# Patient Record
Sex: FEMALE | Race: WHITE | NOT HISPANIC OR LATINO | Employment: FULL TIME | URBAN - METROPOLITAN AREA
[De-identification: names, ages, dates, MRNs, and addresses within clinical notes are randomized per-mention and may not be internally consistent; named-entity substitution may affect disease eponyms.]

---

## 2017-01-11 RX ORDER — ATORVASTATIN CALCIUM 40 MG/1
TABLET, FILM COATED ORAL
Qty: 90 TABLET | Refills: 3 | Status: SHIPPED | OUTPATIENT
Start: 2017-01-11 | End: 2017-09-12 | Stop reason: SDUPTHER

## 2017-09-12 RX ORDER — CLOPIDOGREL BISULFATE 75 MG/1
75 TABLET ORAL DAILY
Qty: 90 TABLET | Refills: 3 | Status: SHIPPED | OUTPATIENT
Start: 2017-09-12 | End: 2018-09-07

## 2017-09-12 RX ORDER — VERAPAMIL HYDROCHLORIDE 240 MG/1
240 TABLET, FILM COATED, EXTENDED RELEASE ORAL NIGHTLY
Qty: 90 TABLET | Refills: 3 | Status: SHIPPED | OUTPATIENT
Start: 2017-09-12 | End: 2018-01-07 | Stop reason: SDUPTHER

## 2017-09-12 RX ORDER — ATORVASTATIN CALCIUM 40 MG/1
40 TABLET, FILM COATED ORAL NIGHTLY
Qty: 90 TABLET | Refills: 3 | Status: SHIPPED | OUTPATIENT
Start: 2017-09-12 | End: 2018-10-08 | Stop reason: SDUPTHER

## 2017-09-12 RX ORDER — LOSARTAN POTASSIUM 50 MG/1
50 TABLET ORAL DAILY
Qty: 90 TABLET | Refills: 3 | Status: SHIPPED | OUTPATIENT
Start: 2017-09-12 | End: 2018-09-07

## 2017-10-26 ENCOUNTER — OFFICE VISIT (OUTPATIENT)
Dept: CARDIOLOGY | Facility: CLINIC | Age: 62
End: 2017-10-26

## 2017-10-26 VITALS
WEIGHT: 193.54 LBS | BODY MASS INDEX: 31.1 KG/M2 | DIASTOLIC BLOOD PRESSURE: 69 MMHG | SYSTOLIC BLOOD PRESSURE: 146 MMHG | HEART RATE: 69 BPM | HEIGHT: 66 IN

## 2017-10-26 DIAGNOSIS — I10 ESSENTIAL HYPERTENSION: ICD-10-CM

## 2017-10-26 DIAGNOSIS — M54.81 OCCIPITAL NEURALGIA OF LEFT SIDE: Primary | ICD-10-CM

## 2017-10-26 PROCEDURE — 93291 INTERROG DEV EVAL SCRMS IP: CPT | Performed by: INTERNAL MEDICINE

## 2017-10-26 PROCEDURE — 99212 OFFICE O/P EST SF 10 MIN: CPT | Performed by: INTERNAL MEDICINE

## 2017-10-26 NOTE — PROGRESS NOTES
OFFICE FOLLOW UP     Date of Encounter:10/26/2017     Name: Agustina Reyes  : 1955  Address: [address] [place] [address]  Phone: [phone]    PCP: Anshul Gill MD  [address] [facility] [address] [place] [address]    Agustina Reyes is a 62 y.o. female.      Chief Complaint: Annual follow up  HTN, CVA with loop recorder interrogation    Problem List:   1. 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 MRI Brain  w/o contrast, 2014:  Showing scattered foci of acute ischemia on left occipital lobe.    b. MRA of neck with and without contrast:  Focal area of the P1 segment of left posterior cerebral artery.    c. Echocardiogram, 2014, LVEF 60-65%.  No  thrombus in left atrial appendage. No PFO. Prominent Chiari network noted in right atrium.    d. Carotid duplex, 2014:  Showing no significant carotid artery stenosis bilaterally.     e. Medtronic loop recorder implant, 2014.   f. Transcranial  Doppler, 2014:  Showing normal flow and wave form seen in MELODIE, NCA, terminal ICA, PCA.  2.  Hypertension.   3. Pre-Diabetes:  a. Hemoglobin A1c 2016:  6.2   4. Exertional Calf burning, possible claudification  5. History of migraines.   6. Hyperlipidemia.   7. Former tobacco use.      Allergies   Allergen Reactions   • Erythromycin        Current Medications:  •  aspirin 81 MG by mouth Daily.  •  atorvastatin 40 MG by mouth Every Night.  •  clopidogrel 75 MG by mouth Daily  •  fluticasone 50 MCG/ACT nasal spray, Daily  •  losartan 50 MG by mouth Daily   •  verapamil  MG CR by mouth Every Night    History of Present Illness:  The patient returns for follow-up today.  She is still working in [employer].  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   She's been unable to lose weight.  Blood pressure at [employer] is reviewed  and is at the lower limits of normal.  Her hemoglobin A1c and LDL are at target.         The following portions of the  "patient's history were reviewed and updated as appropriate: allergies, current medications and problem list.    HPI: Pertinent positives as listed in the HPI.  All other systems reviewed and negative.    Objective:    Vitals:    10/26/17 1147 10/26/17 1149   BP: 157/61 146/69   BP Location: Left arm Left arm   Patient Position: Sitting Standing   Pulse: 76 69   Weight: 193 lb 8.6 oz (87.8 kg)    Height: 66\" (167.6 cm)        Physical Exam:  GENERAL: Alert, cooperative, in no acute distress; overweight.  HEENT: Fundoscopic deferred, otherwise unremarkable.  NECK: No Jugular venous distention, adenopathy, or thyromegaly noted.   HEART: Regular rhythm, normal rate, and no murmurs, gallops, or rubs.   LUNGS: Clear to auscultation bilaterally. No wheezing, rales or ronchi.  ABDOMEN: Flat without evidence of organomegaly, masses, or tenderness.  NEUROLOGIC: No focal abnormalities involving strength or sensation are noted.   EXTREMITIES: No clubbing, cyanosis, or edema noted.     Diagnostic Data:  NOne available    Procedures   Loop recorder interrogated today by [employer] rep reveals NO episodes and \"good\" battery life.      Assessment and Plan: Had a long talk with the patient.  She needs to lose weight and understands this.  We talked about diet strategies that involve low carbohydrate diets and I recommended the [place] diet book to her.  Her blood pressures, as best I can tell, outside of this office are at target.  We will continue to follow this.  Her loop is negative for the third year.  I think that it is important that we continue current medications except for Plavix and I think it would be reasonable to discontinue this drug while continuing aspirin.  I do not think she has claudication and she reports no cardiac symptoms.    Other than continuing current medications and recommendations as noted above, and continuing general recommendations of the AHA/ACC per diet, exercise, etc. I don't have anything more " to offer.  I will see her back in one year, sooner when necessary basis.    I, Paco Bernal MD, [facility], [facility], personally performed the services described in this documentation as scribed by the above named individual in my presence, and it is both accurate and complete. At 2:09 PM on 10/26/2017    Scribed for Paco Bernal MD by Crystal Louis RN. 10/26/2017 12:24 PM.    EMR Dragon/Transcription Disclaimer:  Much of this encounter note is an electronic transcription/translation of spoken language to printed text.  The electronic translation of spoken language may permit erroneous, or at times, nonsensical words or phrases to be inadvertently transcribed.  Although I have reviewed the note for such errors, some may still exist.

## 2017-10-30 ENCOUNTER — TELEPHONE (OUTPATIENT)
Dept: CARDIOLOGY | Facility: CLINIC | Age: 62
End: 2017-10-30

## 2017-10-30 NOTE — TELEPHONE ENCOUNTER
Pt was checked in office last week by Medtronic rep and actively transmitting was marked on interrogation.  Called and left message for pt to call us and verify if Carelink box is plugged up because we are not receiving readings.      She is aware her Carelink box is not plugged up.

## 2018-01-08 RX ORDER — VERAPAMIL HYDROCHLORIDE 240 MG/1
TABLET, FILM COATED, EXTENDED RELEASE ORAL
Qty: 90 TABLET | Refills: 2 | Status: SHIPPED | OUTPATIENT
Start: 2018-01-08 | End: 2018-10-08 | Stop reason: SDUPTHER

## 2018-10-08 RX ORDER — LOSARTAN POTASSIUM 50 MG/1
TABLET ORAL
Qty: 90 TABLET | Refills: 0 | OUTPATIENT
Start: 2018-10-08

## 2018-10-08 RX ORDER — ATORVASTATIN CALCIUM 40 MG/1
TABLET, FILM COATED ORAL
Qty: 90 TABLET | Refills: 0 | OUTPATIENT
Start: 2018-10-08

## 2018-10-08 RX ORDER — ATORVASTATIN CALCIUM 40 MG/1
TABLET, FILM COATED ORAL
Qty: 90 TABLET | Refills: 0 | Status: SHIPPED | OUTPATIENT
Start: 2018-10-08 | End: 2018-10-09 | Stop reason: SDUPTHER

## 2018-10-08 RX ORDER — LOSARTAN POTASSIUM 50 MG/1
50 TABLET ORAL DAILY
Qty: 90 TABLET | Refills: 0 | Status: SHIPPED | OUTPATIENT
Start: 2018-10-08 | End: 2018-10-30 | Stop reason: SDUPTHER

## 2018-10-08 RX ORDER — VERAPAMIL HYDROCHLORIDE 240 MG/1
TABLET, FILM COATED, EXTENDED RELEASE ORAL
Qty: 90 TABLET | Refills: 0 | Status: SHIPPED | OUTPATIENT
Start: 2018-10-08 | End: 2018-10-09 | Stop reason: SDUPTHER

## 2018-10-08 RX ORDER — VERAPAMIL HYDROCHLORIDE 240 MG/1
TABLET, FILM COATED, EXTENDED RELEASE ORAL
Qty: 90 TABLET | Refills: 0 | OUTPATIENT
Start: 2018-10-08

## 2018-10-09 RX ORDER — VERAPAMIL HYDROCHLORIDE 240 MG/1
TABLET, FILM COATED, EXTENDED RELEASE ORAL
Qty: 90 TABLET | Refills: 0 | Status: SHIPPED | OUTPATIENT
Start: 2018-10-09 | End: 2019-01-07 | Stop reason: SDUPTHER

## 2018-10-09 RX ORDER — ATORVASTATIN CALCIUM 40 MG/1
TABLET, FILM COATED ORAL
Qty: 90 TABLET | Refills: 0 | Status: SHIPPED | OUTPATIENT
Start: 2018-10-09 | End: 2019-01-07 | Stop reason: SDUPTHER

## 2018-10-30 ENCOUNTER — OFFICE VISIT (OUTPATIENT)
Dept: CARDIOLOGY | Facility: CLINIC | Age: 63
End: 2018-10-30

## 2018-10-30 VITALS
HEART RATE: 70 BPM | BODY MASS INDEX: 32.14 KG/M2 | WEIGHT: 200 LBS | DIASTOLIC BLOOD PRESSURE: 70 MMHG | SYSTOLIC BLOOD PRESSURE: 154 MMHG | HEIGHT: 66 IN

## 2018-10-30 DIAGNOSIS — Z86.73 HISTORY OF CVA (CEREBROVASCULAR ACCIDENT): ICD-10-CM

## 2018-10-30 DIAGNOSIS — I10 ESSENTIAL HYPERTENSION: Primary | ICD-10-CM

## 2018-10-30 DIAGNOSIS — G43.909 MIGRAINE WITHOUT STATUS MIGRAINOSUS, NOT INTRACTABLE, UNSPECIFIED MIGRAINE TYPE: ICD-10-CM

## 2018-10-30 DIAGNOSIS — E78.49 OTHER HYPERLIPIDEMIA: ICD-10-CM

## 2018-10-30 PROCEDURE — 99214 OFFICE O/P EST MOD 30 MIN: CPT | Performed by: INTERNAL MEDICINE

## 2018-10-30 RX ORDER — LOSARTAN POTASSIUM 100 MG/1
100 TABLET ORAL DAILY
Qty: 90 TABLET | Refills: 3 | Status: SHIPPED | OUTPATIENT
Start: 2018-10-30 | End: 2019-01-04 | Stop reason: SDUPTHER

## 2018-10-30 RX ORDER — CHLORTHALIDONE 25 MG/1
25 TABLET ORAL DAILY
Qty: 90 TABLET | Refills: 3 | Status: SHIPPED | OUTPATIENT
Start: 2018-10-30 | End: 2019-09-28 | Stop reason: SDUPTHER

## 2018-10-30 NOTE — PROGRESS NOTES
"  OFFICE FOLLOW UP     Date of Encounter:10/30/2018     Name: Agustina Reyes  : 1955  Address: [address] [place] [address]  Home Phone:[phone]    PCP:         Agustina Reyes is a 63 y.o. female.      Chief Complaint: Follow up of CVA, HTN, HLD    Problem List:   1. Left occipital  CVA with a possible LPCA stenosis by MRA, 2014  a. MRI Brain  w/o contrast, 2014:  Showing scattered foci of acute ischemia on left occipital lobe.    b. MRA of neck with and without contrast:  Focal area of the P1 segment of left posterior cerebral artery.    c. Echocardiogram, 2014, LVEF 60-65%.  No  thrombus in left atrial appendage. No PFO. Prominent Chiari network noted in right atrium.    d. Carotid duplex, 2014:  Showing no significant carotid artery stenosis bilaterally.     e. Medtronic loop recorder implant, 2014 (battery life , 2018)  f. Transcranial  Doppler, 2014:  Showing normal flow and wave form seen in MELODIE, NCA, terminal ICA, PCA.  2. Hypertension.   3. Pre-Diabetes:  a. Hemoglobin A1c 2016:  6.2   4. Exertional Calf burning, possible claudification  5. History of migraines.   6. Hyperlipidemia.   7. Former tobacco use.      Allergies   Allergen Reactions   • Erythromycin        Current Medications:  •  aspirin 81 MG by mouth Daily  •  atorvastatin 40 MG BY MOUTH DAILY  •  fluticasone 50 MCG/ACT nasal spray, Daily.  •  losartan 50 MG by mouth Daily.  •  verapamil  MG CR BY MOUTH NIGHTLY    History of Present Illness:   Agustina Reyes returns for scheduled follow-up this afternoon.  Since her last visit 1 year ago, she states that \"all of my doctors are retiring\".  She has not had further neurologic symptoms.  She has not had palpitations.  Her loop recorder battery stopped functioning after 3 years and no atrial arrhythmias were noted during that time.  The patient has a long-standing history of migraines and says that they have \"started " "back\"  and that she would  like referral to a neurologist.  She is well states that home blood pressure readings (systolic) in the range of 150-170 mmHg.  She has not had recent labs drawn.           The following portions of the patient's history were reviewed and updated as appropriate: allergies, current medications and problem list.    ROS: Pertinent positives as listed in the HPI.  All other systems reviewed and negative.    Objective:    Vitals:    10/30/18 1518 10/30/18 1519   BP: 156/70 154/70   BP Location: Right arm Right arm   Patient Position: Sitting Standing   Pulse: 66 70   Weight: 90.7 kg (200 lb)    Height: 167.6 cm (66\")        Physical Exam:  GENERAL: Alert, cooperative, in no acute distress.   HEENT: Normocephalic, no adenopathy, no jugular venous distention  HEART: No discrete PMI is noted. Regular rhythm, normal rate, and no murmurs, gallops, or rubs.   LUNGS: Clear to auscultation bilaterally. No wheezing, rales or ronchi.  ABDOMEN: Soft, bowel sounds present, non-tender   NEUROLOGIC: No focal abnormalities involving strength or sensation are noted.   EXTREMITIES: No clubbing, cyanosis, or edema noted.     Diagnostic Data:  No new labs available to review.     Procedures  Loop recorder has reached \"end of battery life\"    Assessment and Plan:   1.  CVA: Secondary prevention with ASA, statin, BP control is underway.  2.  HTN: Control is suboptimal.  We will ask her to continue home blood pressure readings have instructed her on the method to use in this regard.  Today we will increase her losartan from 50 to 100 mg by mouth daily and give her chlorthalidone 25 mg by mouth daily.  She will call us with blood pressures in a few weeks and return in 6 months to see us.  3.  HLD:  We'll obtain a lipid panel and hemoglobin A1c today.    I, Paco Bernal MD, personally performed the services described as documented by the above named individual. I have made any necessary edits and it is both " accurate and complete 10/30/2018  6:03 PM    I will see Agustina Reyes back in one year or sooner on an as needed basis.        Scribed for Paco Bernal MD by Crystal Louis RN. 10/30/2018 3:24 PM.        EMR Dragon/Transcription Disclaimer:  Much of this encounter note is an electronic transcription/translation of spoken language to printed text.  The electronic translation of spoken language may permit erroneous, or at times, nonsensical words or phrases to be inadvertently transcribed.  Although I have reviewed the note for such errors, some may still exist.

## 2018-11-15 ENCOUNTER — OFFICE VISIT (OUTPATIENT)
Dept: NEUROLOGY | Facility: CLINIC | Age: 63
End: 2018-11-15

## 2018-11-15 VITALS
HEART RATE: 67 BPM | WEIGHT: 196 LBS | DIASTOLIC BLOOD PRESSURE: 68 MMHG | BODY MASS INDEX: 31.64 KG/M2 | SYSTOLIC BLOOD PRESSURE: 132 MMHG | OXYGEN SATURATION: 97 %

## 2018-11-15 DIAGNOSIS — R51.0 POSITIONAL HEADACHE: ICD-10-CM

## 2018-11-15 DIAGNOSIS — Z86.73 HISTORY OF CVA (CEREBROVASCULAR ACCIDENT): ICD-10-CM

## 2018-11-15 DIAGNOSIS — G43.109 MIGRAINE WITH AURA AND WITHOUT STATUS MIGRAINOSUS, NOT INTRACTABLE: Primary | ICD-10-CM

## 2018-11-15 PROCEDURE — 99204 OFFICE O/P NEW MOD 45 MIN: CPT | Performed by: NURSE PRACTITIONER

## 2018-11-15 RX ORDER — DIAZEPAM 5 MG/1
TABLET ORAL
Qty: 1 TABLET | Refills: 0 | Status: SHIPPED | OUTPATIENT
Start: 2018-11-15 | End: 2019-04-17

## 2018-11-15 RX ORDER — PROCHLORPERAZINE MALEATE 5 MG/1
5-10 TABLET ORAL EVERY 6 HOURS PRN
Qty: 20 TABLET | Refills: 5 | Status: SHIPPED | OUTPATIENT
Start: 2018-11-15

## 2018-11-15 RX ORDER — BUTALBITAL, ASPIRIN, AND CAFFEINE 50; 325; 40 MG/1; MG/1; MG/1
1 CAPSULE ORAL EVERY 6 HOURS PRN
Qty: 20 CAPSULE | Refills: 0 | Status: SHIPPED | OUTPATIENT
Start: 2018-11-15 | End: 2019-05-23 | Stop reason: ALTCHOICE

## 2018-11-15 NOTE — PROGRESS NOTES
"Subjective:     Patient ID: Agustina Reyes is a 63 y.o. female.    CC:   Chief Complaint   Patient presents with   • Migraine       HPI:   History of Present Illness     This is a very pleasant 63-year-old female who presents to establish care with neurology for history of migraines with worsening symptoms over the past several months.  She was referred by her cardiologist for further evaluation and treatment (PCP recently retired).  She tells me she was diagnosed with migraines around the age of 24.  She tells me that she always gets a visual aura with spots and flashing lights in her left side of her vision which will last 20 or 30 minutes and then she has a throbbing headache with nausea and photophobia. She reports she was diagnosed with \"cluster migraines\". She tells me that her migraines are always on the left side.  She was previously treated by Dr. Milind Wright neurology.  She has previously tried Imitrex, Relpax, Topamax, Depakote and Cafergot which caused severe nausea and vomiting.  Unfortunately in August 2014 she had a severe migraine for 2 days and then on the third day she developed right-sided weakness and paresthesias.  She was admitted to [facility] and was found to have a few left occipital strokes.  She also had an MRA of the head which did show some focal stenosis in the left PCA distribution. MRA neck unremarkable. She was treated with aspirin, Plavix and Lipitor.  She was finally weaned off of the Plavix with negative loop recorder.  She tells me she underwent a loop recorder, echo and carotid duplex.  She's been followed by cardiology Dr. Paco Bernal long-term.  She does take verapamil which she has been on for 30+ years at the current dose.  She does continue to take Lipitor and aspirin.  She tells me in July of this year she started to have migraines more frequently 2/month.  She tells me in August she had 4 episodes, in September she had 2 episodes, in October she had 6 " episodes with her most recent episode on 10/24/18.  She has had no episodes so far this month.  She has been concerned because previously they were once every several months.  She has never noted any trigger for her migraines.  She has had her eyes checked recently and has previously had cataract surgery. Headaches are worsened with bending over, coughing and sneezing which are different than prior migraines.    The following portions of the patient's history were reviewed and updated as appropriate: allergies, current medications, past family history, past medical history, past social history, past surgical history and problem list.    Past Medical History:   Diagnosis Date   • Hyperlipidemia    • Hypertension    • Migraines    • Occipital neuralgia of left side    • Stroke ([facility])        Past Surgical History:   Procedure Laterality Date   • CATARACT EXTRACTION     • HYSTERECTOMY         Social History     Socioeconomic History   • Marital status: Single     Spouse name: Not on file   • Number of children: Not on file   • Years of education: Not on file   • Highest education level: Not on file   Social Needs   • Financial resource strain: Not on file   • Food insecurity - worry: Not on file   • Food insecurity - inability: Not on file   • Transportation needs - medical: Not on file   • Transportation needs - non-medical: Not on file   Occupational History   • Not on file   Tobacco Use   • Smoking status: Former Smoker     Last attempt to quit: 10/26/2014     Years since quittin.0   • Smokeless tobacco: Never Used   Substance and Sexual Activity   • Alcohol use: Yes     Comment: occasional   • Drug use: No   • Sexual activity: Defer   Other Topics Concern   • Not on file   Social History Narrative   • Not on file       Family History   Problem Relation Age of Onset   • Heart disease Mother    • Stroke Mother    • Hypertension Mother    • Heart disease Father    • Hypertension Father    • Stroke Maternal  Grandfather         Review of Systems   Constitutional: Negative for chills, fatigue, fever and unexpected weight change.   HENT: Negative for ear pain, hearing loss, nosebleeds, rhinorrhea and sore throat.    Eyes: Positive for photophobia. Negative for pain, discharge, itching and visual disturbance.   Respiratory: Negative for cough, chest tightness, shortness of breath and wheezing.    Cardiovascular: Negative for chest pain, palpitations and leg swelling.   Gastrointestinal: Negative for abdominal pain, blood in stool, constipation, diarrhea, nausea and vomiting.   Genitourinary: Negative for dysuria, frequency, hematuria and urgency.   Musculoskeletal: Negative for arthralgias, back pain, gait problem, joint swelling, myalgias, neck pain and neck stiffness.   Skin: Negative for rash and wound.   Allergic/Immunologic: Negative for environmental allergies and food allergies.   Neurological: Positive for headaches. Negative for dizziness, tremors, syncope, speech difficulty, weakness and light-headedness.   Hematological: Negative for adenopathy. Does not bruise/bleed easily.   Psychiatric/Behavioral: Negative for agitation, confusion, decreased concentration, hallucinations, sleep disturbance and suicidal ideas. The patient is not nervous/anxious.    All other systems reviewed and are negative.       Objective:    Neurologic Exam     Mental Status   Oriented to person, place, and time.   Registration: recalls 3 of 3 objects. Recall at 5 minutes: recalls 3 of 3 objects. Follows 3 step commands.   Attention: normal. Concentration: normal.   Speech: speech is normal   Level of consciousness: alert  Knowledge: good and consistent with education. Able to perform simple calculations.   Able to name object. Able to read. Able to repeat. Able to write. Normal comprehension.     Cranial Nerves   Cranial nerves II through XII intact.     Motor Exam   Muscle bulk: normal  Overall muscle tone: normal    Strength   Strength  5/5 except as noted.   Right strength: Slightly weaker RUE chronic 2nd to CVA    Sensory Exam   Light touch normal.   Vibration normal.   Proprioception normal.   Pinprick normal.     Gait, Coordination, and Reflexes     Gait  Gait: normal    Coordination   Romberg: negative  Finger to nose coordination: normal  Heel to shin coordination: normal    Tremor   Resting tremor: absent  Intention tremor: absent  Action tremor: absent    Reflexes   Right brachioradialis: 2+  Left brachioradialis: 2+  Right biceps: 2+  Left biceps: 2+  Right triceps: 2+  Left triceps: 2+  Right patellar: 2+  Left patellar: 2+  Right achilles: 2+  Left achilles: 2+  Right : 2+  Left : 2+  Right plantar: normal  Left plantar: normal  Right Santizo: absent  Left Santizo: absent  Right ankle clonus: absent  Left ankle clonus: absent      Physical Exam   Constitutional: She is oriented to person, place, and time. She appears well-developed and well-nourished.   Eyes:   Fundoscopic exam:       The right eye shows no papilledema. The right eye shows red reflex.        The left eye shows no papilledema. The left eye shows red reflex.   Neck: Carotid bruit is not present.   Cardiovascular: Normal rate, regular rhythm, S1 normal, S2 normal and normal heart sounds.   Pulmonary/Chest: Effort normal and breath sounds normal.   Neurological: She is oriented to person, place, and time. She has a normal Finger-Nose-Finger Test, a normal Heel to Villarreal Test and a normal Romberg Test. Gait normal.   Reflex Scores:       Tricep reflexes are 2+ on the right side and 2+ on the left side.       Bicep reflexes are 2+ on the right side and 2+ on the left side.       Brachioradialis reflexes are 2+ on the right side and 2+ on the left side.       Patellar reflexes are 2+ on the right side and 2+ on the left side.       Achilles reflexes are 2+ on the right side and 2+ on the left side.  Psychiatric: She has a normal mood and affect. Her speech is normal and  behavior is normal. Judgment and thought content normal. Cognition and memory are normal.       Assessment/Plan:       Agustina was seen today for migraine.    Diagnoses and all orders for this visit:    Migraine with aura and without status migrainosus, not intractable  -     MRI Brain With & Without Contrast  -     MRI Angiogram Head Without Contrast  -     diazePAM (VALIUM) 5 MG tablet; Take 30-45 minutes prior to MRI  -     CBC & Differential  -     Comprehensive Metabolic Panel  -     C-reactive Protein  -     Sedimentation Rate  -     Antinuclear Antibody With Reflex [place]  -     Thyroid Panel With TSH  -     butalbital-aspirin-caffeine (FIORINAL) -40 MG per capsule; Take 1 capsule by mouth Every 6 (Six) Hours As Needed for Headache or Migraine.  -     prochlorperazine (COMPAZINE) 5 MG tablet; Take 1-2 tablets by mouth Every 6 (Six) Hours As Needed for Nausea or Vomiting (migraine).    Positional headache  -     MRI Brain With & Without Contrast  -     MRI Angiogram Head Without Contrast  -     CBC & Differential  -     Comprehensive Metabolic Panel  -     C-reactive Protein  -     Sedimentation Rate  -     Antinuclear Antibody With Reflex [place]  -     Thyroid Panel With TSH    History of CVA (cerebrovascular accident)  -     MRI Brain With & Without Contrast  -     MRI Angiogram Head Without Contrast         MRI Brain with and without contrast to r/o acute abnormalities with prior stroke history and change in headaches. MRA head with previously known cerebral artery stenosis with recurrent migraines with aura to re-eval this region. Continue Aspirin and Lipitor. Labs today since she has not had labs in some time. Needs to establish with new PCP. Fioricet PRN migraines-to be used sparingly. Compazine PRN migraines. No preventives at this time, but if consistently more frequent would consider amitritpyline. Triptans are contraindicated. F/U in 4 weeks or sooner if needed. We reviewed s&s of stroke and  when to present to ER for further eval. Reviewed medications, potential side effects and signs and symptoms to report. Discussed risk versus benefits of treatment plan with patient and/or family-including medications, labs and radiology that may be ordered. Addressed questions and concerns during visit. Patient and/or family verbalized understanding and agree with plan.    During this visit the following were done:  Labs Reviewed []    Labs Ordered [x]    Radiology Reports Reviewed [x]    Radiology Ordered [x]    PCP Records Reviewed []    Referring Provider Records Reviewed [x]    ER Records Reviewed [x]    Hospital Records Reviewed [x]    History Obtained From Family []    Radiology Images Reviewed [x]    Other Reviewed [x]    Records Requested []      EMR Dragon/Transcription Disclaimer:  Much of this encounter note is an electronic transcription of spoken language to printed text. Electronic transcription of spoken language may permit erroneous words or phrases to be inadvertently transcribed. Although I have reviewed the note for such errors, some may still exist in this documentation.      [place] Ivye, APRN  11/15/2018

## 2018-11-27 ENCOUNTER — TELEPHONE (OUTPATIENT)
Dept: NEUROLOGY | Facility: CLINIC | Age: 63
End: 2018-11-27

## 2018-11-28 NOTE — TELEPHONE ENCOUNTER
We will complete MRI Brain first and if this is abnormal or shows anything concerning then we can get the MRA approved.

## 2018-12-03 ENCOUNTER — TELEPHONE (OUTPATIENT)
Dept: NEUROLOGY | Facility: CLINIC | Age: 63
End: 2018-12-03

## 2018-12-03 NOTE — TELEPHONE ENCOUNTER
Please notify patient mri brain shows 2 old strokes and otherwise very minimal aging changes associated with her history of migraines. No new findings. I am going by the radiology report only-if she can bring the mri brain disc when she returns for follow up I can review the images as well. Thanks.

## 2018-12-05 NOTE — TELEPHONE ENCOUNTER
Informed pt of the mri results and recommend she bring the actual dis for [place] to view on her next f/u>

## 2018-12-17 ENCOUNTER — APPOINTMENT (OUTPATIENT)
Dept: LAB | Facility: HOSPITAL | Age: 63
End: 2018-12-17

## 2018-12-17 ENCOUNTER — OFFICE VISIT (OUTPATIENT)
Dept: NEUROLOGY | Facility: CLINIC | Age: 63
End: 2018-12-17

## 2018-12-17 VITALS
OXYGEN SATURATION: 96 % | DIASTOLIC BLOOD PRESSURE: 62 MMHG | SYSTOLIC BLOOD PRESSURE: 136 MMHG | HEART RATE: 78 BPM | WEIGHT: 198 LBS | BODY MASS INDEX: 31.96 KG/M2

## 2018-12-17 DIAGNOSIS — G43.109 MIGRAINE WITH AURA AND WITHOUT STATUS MIGRAINOSUS, NOT INTRACTABLE: ICD-10-CM

## 2018-12-17 DIAGNOSIS — Z86.73 HISTORY OF CVA (CEREBROVASCULAR ACCIDENT): ICD-10-CM

## 2018-12-17 DIAGNOSIS — I66.22 OCCLUSION AND STENOSIS OF LEFT POSTERIOR CEREBRAL ARTERY: Primary | ICD-10-CM

## 2018-12-17 LAB
ALBUMIN SERPL-MCNC: 4.67 G/DL (ref 3.2–4.8)
ALBUMIN/GLOB SERPL: 2.3 G/DL (ref 1.5–2.5)
ALP SERPL-CCNC: 81 U/L (ref 25–100)
ALT SERPL W P-5'-P-CCNC: 17 U/L (ref 7–40)
ANION GAP SERPL CALCULATED.3IONS-SCNC: 10 MMOL/L (ref 3–11)
AST SERPL-CCNC: 16 U/L (ref 0–33)
BASOPHILS # BLD AUTO: 0.05 10*3/MM3 (ref 0–0.2)
BASOPHILS NFR BLD AUTO: 0.6 % (ref 0–1)
BILIRUB SERPL-MCNC: 0.5 MG/DL (ref 0.3–1.2)
BUN BLD-MCNC: 12 MG/DL (ref 9–23)
BUN/CREAT SERPL: 15.4 (ref 7–25)
CALCIUM SPEC-SCNC: 9.5 MG/DL (ref 8.7–10.4)
CHLORIDE SERPL-SCNC: 103 MMOL/L (ref 99–109)
CO2 SERPL-SCNC: 28 MMOL/L (ref 20–31)
CREAT BLD-MCNC: 0.78 MG/DL (ref 0.6–1.3)
CRP SERPL-MCNC: 1.41 MG/DL (ref 0–1)
DEPRECATED FTI SERPL-MCNC: 2 TBI
DEPRECATED RDW RBC AUTO: 45.5 FL (ref 37–54)
EOSINOPHIL # BLD AUTO: 0.19 10*3/MM3 (ref 0–0.3)
EOSINOPHIL NFR BLD AUTO: 2.2 % (ref 0–3)
ERYTHROCYTE [DISTWIDTH] IN BLOOD BY AUTOMATED COUNT: 14.2 % (ref 11.3–14.5)
ERYTHROCYTE [SEDIMENTATION RATE] IN BLOOD: 30 MM/HR (ref 0–30)
GFR SERPL CREATININE-BSD FRML MDRD: 75 ML/MIN/1.73
GLOBULIN UR ELPH-MCNC: 2 GM/DL
GLUCOSE BLD-MCNC: 86 MG/DL (ref 70–100)
HCT VFR BLD AUTO: 38.2 % (ref 34.5–44)
HGB BLD-MCNC: 12.3 G/DL (ref 11.5–15.5)
IMM GRANULOCYTES # BLD: 0.02 10*3/MM3 (ref 0–0.03)
IMM GRANULOCYTES NFR BLD: 0.2 % (ref 0–0.6)
LYMPHOCYTES # BLD AUTO: 2.85 10*3/MM3 (ref 0.6–4.8)
LYMPHOCYTES NFR BLD AUTO: 33.6 % (ref 24–44)
MCH RBC QN AUTO: 28 PG (ref 27–31)
MCHC RBC AUTO-ENTMCNC: 32.2 G/DL (ref 32–36)
MCV RBC AUTO: 86.8 FL (ref 80–99)
MONOCYTES # BLD AUTO: 0.46 10*3/MM3 (ref 0–1)
MONOCYTES NFR BLD AUTO: 5.4 % (ref 0–12)
NEUTROPHILS # BLD AUTO: 4.91 10*3/MM3 (ref 1.5–8.3)
NEUTROPHILS NFR BLD AUTO: 58 % (ref 41–71)
PLATELET # BLD AUTO: 279 10*3/MM3 (ref 150–450)
PMV BLD AUTO: 10.2 FL (ref 6–12)
POTASSIUM BLD-SCNC: 4 MMOL/L (ref 3.5–5.5)
PROT SERPL-MCNC: 6.7 G/DL (ref 5.7–8.2)
RBC # BLD AUTO: 4.4 10*6/MM3 (ref 3.89–5.14)
SODIUM BLD-SCNC: 141 MMOL/L (ref 132–146)
T3RU NFR SERPL: 27.6 % (ref 23–37)
T4 SERPL-MCNC: 7.4 MCG/DL (ref 4.7–11.4)
TSH SERPL DL<=0.05 MIU/L-ACNC: 2.04 MIU/ML (ref 0.35–5.35)
WBC NRBC COR # BLD: 8.48 10*3/MM3 (ref 3.5–10.8)

## 2018-12-17 PROCEDURE — 84443 ASSAY THYROID STIM HORMONE: CPT | Performed by: NURSE PRACTITIONER

## 2018-12-17 PROCEDURE — 84479 ASSAY OF THYROID (T3 OR T4): CPT | Performed by: NURSE PRACTITIONER

## 2018-12-17 PROCEDURE — 86140 C-REACTIVE PROTEIN: CPT | Performed by: NURSE PRACTITIONER

## 2018-12-17 PROCEDURE — 36415 COLL VENOUS BLD VENIPUNCTURE: CPT | Performed by: NURSE PRACTITIONER

## 2018-12-17 PROCEDURE — 80053 COMPREHEN METABOLIC PANEL: CPT | Performed by: NURSE PRACTITIONER

## 2018-12-17 PROCEDURE — 86038 ANTINUCLEAR ANTIBODIES: CPT | Performed by: NURSE PRACTITIONER

## 2018-12-17 PROCEDURE — 99214 OFFICE O/P EST MOD 30 MIN: CPT | Performed by: NURSE PRACTITIONER

## 2018-12-17 PROCEDURE — 85025 COMPLETE CBC W/AUTO DIFF WBC: CPT | Performed by: NURSE PRACTITIONER

## 2018-12-17 PROCEDURE — 84436 ASSAY OF TOTAL THYROXINE: CPT | Performed by: NURSE PRACTITIONER

## 2018-12-17 NOTE — PROGRESS NOTES
"Subjective:     Patient ID: Agustina Reyes is a 63 y.o. female.    CC:   Chief Complaint   Patient presents with   • Migraine       HPI:   History of Present Illness   This is a very pleasant 63-year-old female who presents for 1 month follow up on migraine with aura with a history of migraines for several years. Diagnosed with migraines at age 24 with aura. She tells me that she always gets a visual aura with spots and flashing lights in her left side of her vision which will last 20 or 30 minutes and then she has a throbbing headache with nausea and photophobia. Since last visit she had 6 migraines in October, 1 in November and 2 in December so far. She has not tried the Fioricet or Compazine prescribed last visit. She tells me the past 3 have not been a \"full blown migraine\", just visual symptoms and then headache and she has gone to sleep and not taken medication. She has previously tried Imitrex, Relpax, Topamax, Depakote and Cafergot which caused severe nausea and vomiting. She did have a repeat MRI of the brain with and without contrast 11/30/2018 and the imaging and radiology report reviewed today in office.  This does show to old strokes in the left occipital region which appear to be the same as her 2014 imaging, a few punctate chronic white matter changes and no significant atrophy noted.  MRI of the brain appears to remain stable from 2014 imaging.  Her MRA of the brain was denied, and I will request this again since she did have an abnormal MRA of the brain in 2014 showing left posterior cerebral artery stenosis and I do want to reevaluate this with her positional and worsening headaches.  She is on aspirin and the Lipitor still.  She was unable to tolerate the increase in losartan by her cardiologist due to severe leg cramps and has gone back to the 50 mg and blood pressure today is stable at 136/62 and I recommended she follow-up with her cardiologist to discuss this medication.    Prior significant " history: Unfortunately in 2014 she had a severe migraine for 2 days and then on the third day she developed right-sided weakness and paresthesias.  She was admitted to [facility] and was found to have a few left occipital strokes.  She also had an MRA of the head which did show some focal stenosis in the left PCA distribution. MRA neck unremarkable. She was treated with aspirin, Plavix and Lipitor.  She was finally weaned off of the Plavix with negative loop recorder.  She tells me she underwent a loop recorder, echo and carotid duplex.  She's been followed by cardiology Dr. Paco Bernal long-term.  She does take verapamil which she has been on for 30+ years at the current dose.  She does continue to take Lipitor and aspirin.      The following portions of the patient's history were reviewed and updated as appropriate: allergies, current medications, past family history, past medical history, past social history, past surgical history and problem list.    Past Medical History:   Diagnosis Date   • Hyperlipidemia    • Hypertension    • Migraines    • Occipital neuralgia of left side    • Stroke (CMS/HCC)        Past Surgical History:   Procedure Laterality Date   • CATARACT EXTRACTION     • HYSTERECTOMY         Social History     Socioeconomic History   • Marital status: Single     Spouse name: Not on file   • Number of children: Not on file   • Years of education: Not on file   • Highest education level: Not on file   Social Needs   • Financial resource strain: Not on file   • Food insecurity - worry: Not on file   • Food insecurity - inability: Not on file   • Transportation needs - medical: Not on file   • Transportation needs - non-medical: Not on file   Occupational History   • Not on file   Tobacco Use   • Smoking status: Former Smoker     Last attempt to quit: 10/26/2014     Years since quittin.1   • Smokeless tobacco: Never Used   Substance and Sexual Activity   • Alcohol use: Yes      Comment: occasional   • Drug use: No   • Sexual activity: Defer   Other Topics Concern   • Not on file   Social History Narrative   • Not on file       Family History   Problem Relation Age of Onset   • Heart disease Mother    • Stroke Mother    • Hypertension Mother    • Heart disease Father    • Hypertension Father    • Stroke Maternal Grandfather         Review of Systems   Constitutional: Negative for chills, fatigue, fever and unexpected weight change.   HENT: Negative for ear pain, hearing loss, nosebleeds, rhinorrhea and sore throat.    Eyes: Negative for photophobia, pain, discharge, itching and visual disturbance.   Respiratory: Negative for cough, chest tightness, shortness of breath and wheezing.    Cardiovascular: Negative for chest pain, palpitations and leg swelling.   Gastrointestinal: Negative for abdominal pain, blood in stool, constipation, diarrhea, nausea and vomiting.   Genitourinary: Negative for dysuria, frequency, hematuria and urgency.   Musculoskeletal: Negative for arthralgias, back pain, gait problem, joint swelling, myalgias, neck pain and neck stiffness.   Skin: Negative for rash and wound.   Allergic/Immunologic: Negative for environmental allergies and food allergies.   Neurological: Positive for headaches. Negative for dizziness, tremors, seizures, syncope, speech difficulty, weakness, light-headedness and numbness.   Hematological: Negative for adenopathy. Does not bruise/bleed easily.   Psychiatric/Behavioral: Negative for agitation, confusion, decreased concentration, hallucinations, sleep disturbance and suicidal ideas. The patient is not nervous/anxious.    All other systems reviewed and are negative.       Objective:    Neurologic Exam     Mental Status   Oriented to person, place, and time.   Level of consciousness: alert    Cranial Nerves   Cranial nerves II through XII intact.     Motor Exam   Muscle bulk: normal  Overall muscle tone: normal    Strength   Strength 5/5  except as noted.   Right strength: Mild RUE weakness chronic since CVA    Gait, Coordination, and Reflexes     Gait  Gait: normal    Coordination   Finger to nose coordination: normal    Tremor   Resting tremor: absent  Intention tremor: absent  Action tremor: absent    Reflexes   Right brachioradialis: 2+  Left brachioradialis: 2+  Right biceps: 2+  Left biceps: 2+  Right triceps: 2+  Left triceps: 2+  Right : 2+  Left : 2+      Physical Exam   Constitutional: She is oriented to person, place, and time.   Neurological: She is oriented to person, place, and time. She has a normal Finger-Nose-Finger Test. Gait normal.   Reflex Scores:       Tricep reflexes are 2+ on the right side and 2+ on the left side.       Bicep reflexes are 2+ on the right side and 2+ on the left side.       Brachioradialis reflexes are 2+ on the right side and 2+ on the left side.      Assessment/Plan:       Agustina was seen today for migraine.    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 Improving.    History of CVA (cerebrovascular accident)  Comments:  Repeat MRI Brain w/wo contrast reviewed today 11/30/18-stable compared to 2014 imaging-old occipital infarcts on Left, minimal chronic changes.           MRA of the head without contrast with a set up on consideration of increased cerebral artery stenosis or aneurysm and I would like to rule this out.  MRI of the brain does appear to remain stable.  I recommended she continue the Fioricet and Compazine when necessary and consider using this at onset of her symptoms and not to delay this.  She will follow-up in 3-4 months for reevaluation of symptoms.  I've asked her to continue to keep a log  of her migraines and if she is having within 8 episodes per month we would consider preventive medication. Reviewed medications, potential side effects and signs and symptoms to report. Discussed risk versus benefits of treatment plan with patient and/or family-including medications, labs and radiology that may be ordered. Addressed questions and concerns during visit. Patient and/or family verbalized understanding and agree with plan.  She also did not get her blood work drawn last visit and she was asked to have these obtained today after her visit and she had these drawn out in our lab and we will notify her of those results and any further recommendations by phone.    During this visit the following were done:  Labs Reviewed []    Labs Ordered [x]    Radiology Reports Reviewed [x]    Radiology Ordered [x]    PCP Records Reviewed []    Referring Provider Records Reviewed []    ER Records Reviewed []    Hospital Records Reviewed []    History Obtained From Family []    Radiology Images Reviewed [x]    Other Reviewed [x]    Records Requested []      EMR Dragon/Transcription Disclaimer:  Much of this encounter note is an electronic transcription of spoken language to printed text. Electronic transcription of spoken language may permit erroneous words or phrases to be inadvertently transcribed. Although I have reviewed the note for such errors, some may still exist in this documentation.    China Ivey, APRN  12/17/2018

## 2018-12-19 ENCOUNTER — TELEPHONE (OUTPATIENT)
Dept: NEUROLOGY | Facility: CLINIC | Age: 63
End: 2018-12-19

## 2018-12-19 LAB
ANA SER QL: NEGATIVE
Lab: NORMAL

## 2018-12-19 NOTE — TELEPHONE ENCOUNTER
----- Message from JAYNE Ponce sent at 12/19/2018 12:59 PM EST -----  Please notify patient all of her blood work looks excellent except one mild elevation in one general inflammatory marker-she has one more test pending which will tell us if this is significant or not. We will keep her posted on those results once received. Thanks, JAYNE Perla    Also, would she like us to mail her a PCP packet so she can establish care with a new primary care provider-if so go ahead and mail that to her. Thanks!

## 2018-12-19 NOTE — TELEPHONE ENCOUNTER
----- Message from JAYNE Ponce sent at 12/19/2018  1:23 PM EST -----  Please notify patient her autoimmune panel came back NEGATIVE. We will f/u as scheduled in office. Thanks, JAYNE Perla

## 2018-12-21 ENCOUNTER — TELEPHONE (OUTPATIENT)
Dept: NEUROLOGY | Facility: CLINIC | Age: 63
End: 2018-12-21

## 2019-01-04 RX ORDER — LOSARTAN POTASSIUM 50 MG/1
50 TABLET ORAL DAILY
Qty: 90 TABLET | Refills: 0 | OUTPATIENT
Start: 2019-01-04

## 2019-01-04 RX ORDER — LOSARTAN POTASSIUM 100 MG/1
100 TABLET ORAL DAILY
Qty: 90 TABLET | Refills: 3 | Status: SHIPPED | OUTPATIENT
Start: 2019-01-04 | End: 2019-05-23 | Stop reason: SDUPTHER

## 2019-01-07 RX ORDER — ATORVASTATIN CALCIUM 40 MG/1
TABLET, FILM COATED ORAL
Qty: 90 TABLET | Refills: 0 | Status: SHIPPED | OUTPATIENT
Start: 2019-01-07 | End: 2019-04-05 | Stop reason: SDUPTHER

## 2019-01-07 RX ORDER — VERAPAMIL HYDROCHLORIDE 240 MG/1
TABLET, FILM COATED, EXTENDED RELEASE ORAL
Qty: 90 TABLET | Refills: 0 | Status: SHIPPED | OUTPATIENT
Start: 2019-01-07 | End: 2019-04-05 | Stop reason: SDUPTHER

## 2019-04-08 RX ORDER — VERAPAMIL HYDROCHLORIDE 240 MG/1
TABLET, FILM COATED, EXTENDED RELEASE ORAL
Qty: 30 TABLET | Refills: 1 | Status: SHIPPED | OUTPATIENT
Start: 2019-04-08 | End: 2019-07-05 | Stop reason: SDUPTHER

## 2019-04-08 RX ORDER — ATORVASTATIN CALCIUM 40 MG/1
TABLET, FILM COATED ORAL
Qty: 30 TABLET | Refills: 1 | Status: SHIPPED | OUTPATIENT
Start: 2019-04-08 | End: 2019-07-05 | Stop reason: SDUPTHER

## 2019-04-17 ENCOUNTER — OFFICE VISIT (OUTPATIENT)
Dept: NEUROLOGY | Facility: CLINIC | Age: 64
End: 2019-04-17

## 2019-04-17 VITALS
DIASTOLIC BLOOD PRESSURE: 78 MMHG | SYSTOLIC BLOOD PRESSURE: 128 MMHG | HEIGHT: 66 IN | OXYGEN SATURATION: 92 % | BODY MASS INDEX: 30.86 KG/M2 | WEIGHT: 192 LBS | HEART RATE: 81 BPM

## 2019-04-17 DIAGNOSIS — Z86.73 HISTORY OF CVA (CEREBROVASCULAR ACCIDENT): ICD-10-CM

## 2019-04-17 DIAGNOSIS — R90.89 ABNORMAL MRA, BRAIN: Primary | ICD-10-CM

## 2019-04-17 DIAGNOSIS — G43.109 MIGRAINE WITH AURA AND WITHOUT STATUS MIGRAINOSUS, NOT INTRACTABLE: ICD-10-CM

## 2019-04-17 DIAGNOSIS — Z76.89 ENCOUNTER TO ESTABLISH CARE WITH NEW DOCTOR: ICD-10-CM

## 2019-04-17 DIAGNOSIS — I66.22 OCCLUSION AND STENOSIS OF LEFT POSTERIOR CEREBRAL ARTERY: ICD-10-CM

## 2019-04-17 PROCEDURE — 99213 OFFICE O/P EST LOW 20 MIN: CPT | Performed by: NURSE PRACTITIONER

## 2019-04-17 NOTE — PROGRESS NOTES
Subjective:     Patient ID: Agustina Reyes is a 63 y.o. female.    CC:   Chief Complaint   Patient presents with   • Migraine       HPI:   History of Present Illness   This is a very pleasant 63-year-old female who presents for 4 month follow up on migraine with aura for several years. Diagnosed with migraines at age 24 with aura. She tells me that she always gets a visual aura with spots and flashing lights in her left side of her vision which will last 20 or 30 minutes and then she has a throbbing headache with nausea and photophobia. She is having about 1-2 migraines  She has not tried the Fioricet or Compazine prescribed last visit. Usually episodes occur at work and resolve on their own without medication. She has previously tried Imitrex, Relpax, Topamax, Depakote and Cafergot which caused severe nausea and vomiting. She did have a repeat MRI of the brain with and without contrast 11/30/2018 and the imaging and radiology report reviewed today in office.  This does show 2 old strokes in the left occipital region which appear to be the same as her 2014 imaging, a few punctate chronic white matter changes and no significant atrophy noted.  MRI of the brain appears to remain stable from 2014 imaging. MRA head denied 3 times by insurance with abnormal MRA of the brain in 2014 showing left posterior cerebral artery stenosis. She is on aspirin and the Lipitor still.  HTN managed with Cardiology. Does not have a PCP but gets fasting labs through her [employer] annually.     Prior significant history: Unfortunately in August 2014 she had a severe migraine for 2 days and then on the third day she developed right-sided weakness and paresthesias.  She was admitted to [facility] and was found to have a few left occipital strokes.  She also had an MRA of the head which did show some focal stenosis in the left PCA distribution. MRA neck unremarkable. She was treated with aspirin, Plavix and Lipitor.  She  was finally weaned off of the Plavix with negative loop recorder.  She tells me she underwent a loop recorder, echo and carotid duplex.  She's been followed by cardiology Dr. Paco Bernal long-term.  She does take verapamil which she has been on for 30+ years at the current dose.  She does continue to take Lipitor and aspirin.     The following portions of the patient's history were reviewed and updated as appropriate: allergies, current medications, past family history, past medical history, past social history, past surgical history and problem list.    Past Medical History:   Diagnosis Date   • Hyperlipidemia    • Hypertension    • Migraines    • Occipital neuralgia of left side    • Stroke (CMS/HCC)        Past Surgical History:   Procedure Laterality Date   • CATARACT EXTRACTION     • HYSTERECTOMY         Social History     Socioeconomic History   • Marital status: Single     Spouse name: Not on file   • Number of children: Not on file   • Years of education: Not on file   • Highest education level: Not on file   Tobacco Use   • Smoking status: Former Smoker     Last attempt to quit: 10/26/2014     Years since quittin.4   • Smokeless tobacco: Never Used   Substance and Sexual Activity   • Alcohol use: Yes     Comment: occasional   • Drug use: No   • Sexual activity: Defer       Family History   Problem Relation Age of Onset   • Heart disease Mother    • Stroke Mother    • Hypertension Mother    • Heart disease Father    • Hypertension Father    • Stroke Maternal Grandfather         Review of Systems   Constitutional: Negative for chills, fatigue, fever and unexpected weight change.   HENT: Negative for ear pain, hearing loss, nosebleeds, rhinorrhea and sore throat.    Eyes: Negative for photophobia, pain, discharge, itching and visual disturbance.   Respiratory: Negative for cough, chest tightness, shortness of breath and wheezing.    Cardiovascular: Negative for chest pain, palpitations and leg swelling.    Gastrointestinal: Negative for abdominal pain, blood in stool, constipation, diarrhea, nausea and vomiting.   Genitourinary: Negative for dysuria, frequency, hematuria and urgency.   Musculoskeletal: Negative for arthralgias, back pain, gait problem, joint swelling, myalgias, neck pain and neck stiffness.   Skin: Negative for rash and wound.   Allergic/Immunologic: Negative for environmental allergies and food allergies.   Neurological: Negative for dizziness, tremors, seizures, syncope, speech difficulty, weakness, light-headedness, numbness and headaches.   Hematological: Negative for adenopathy. Does not bruise/bleed easily.   Psychiatric/Behavioral: Negative for agitation, confusion, decreased concentration, hallucinations, sleep disturbance and suicidal ideas. The patient is not nervous/anxious.    All other systems reviewed and are negative.       Objective:    Neurologic Exam  Mental Status   Oriented to person, place, and time.   Level of consciousness: alert     Cranial Nerves   Cranial nerves II through XII intact.      Motor Exam   Muscle bulk: normal  Overall muscle tone: normal     Strength   Strength 5/5 except as noted.   Right strength: Mild RUE weakness chronic since CVA     Gait, Coordination, and Reflexes      Gait  Gait: normal     Coordination   Finger to nose coordination: normal     Tremor   Resting tremor: absent  Intention tremor: absent  Action tremor: absent     Reflexes   Right brachioradialis: 2+  Left brachioradialis: 2+  Right biceps: 2+  Left biceps: 2+  Right triceps: 2+  Left triceps: 2+  Right : 2+  Left : 2+     Physical Exam  Constitutional: She is oriented to person, place, and time.   Neurological: She is oriented to person, place, and time. She has a normal Finger-Nose-Finger Test. Gait normal.   Reflex Scores:       Tricep reflexes are 2+ on the right side and 2+ on the left side.       Bicep reflexes are 2+ on the right side and 2+ on the left side.        Brachioradialis reflexes are 2+ on the right side and 2+ on the left side.    Assessment/Plan:       Agustina was seen today for migraine.    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 MRA head denied by insurance.    History of CVA (cerebrovascular accident)  Comments:  Continue Lipitor and Asa    Encounter to establish care with new doctor  -     Ambulatory Referral to Family Practice           Will try to get CTA done since her history of Left PCA stenosis to ensure stability. Has had some positional headaches. Continue Aspirin and Lipitor. Continue close follow up with Cardiology. Referral to establish care with PCP for preventive wellness screening. Migraines improved-can be triggered by stress during holidays. Reviewed medications, potential side effects and signs and symptoms to report. Discussed risk versus benefits of treatment plan with patient and/or family-including medications, labs and radiology that may be ordered. Addressed questions and concerns during visit. Patient and/or family verbalized understanding and agree with plan.    During this visit the following were done:  Labs Reviewed []    Labs Ordered []    Radiology Reports Reviewed []    Radiology Ordered [x]    PCP Records Reviewed []    Referring Provider Records Reviewed []    ER Records Reviewed []    Hospital Records Reviewed []    History Obtained From Family []    Radiology Images Reviewed []    Other Reviewed []    Records Requested []      EMR Dragon/Transcription Disclaimer:  Much of this encounter note is an electronic transcription of spoken language to printed text. Electronic transcription of spoken language may permit erroneous words or phrases to be  inadvertently transcribed. Although I have reviewed the note for such errors, some may still exist in this documentation.      China Ivey, APRN  4/17/2019

## 2019-05-08 ENCOUNTER — TELEPHONE (OUTPATIENT)
Dept: NEUROLOGY | Facility: CLINIC | Age: 64
End: 2019-05-08

## 2019-05-08 DIAGNOSIS — I66.22 OCCLUSION AND STENOSIS OF LEFT POSTERIOR CEREBRAL ARTERY: Primary | ICD-10-CM

## 2019-05-08 DIAGNOSIS — Z86.73 HISTORY OF CVA (CEREBROVASCULAR ACCIDENT): ICD-10-CM

## 2019-05-08 NOTE — TELEPHONE ENCOUNTER
Please notify patient I received cta head results from [facility]-this does show a continuing moderate to severe narrowing of the left posterior cerebral artery In the brain. I do recommend referring her to neurosurgery not for surgery but just for their recommendations on if she should be back on plavix along with the aspirin and cholesterol medication. If she is willing to have this referral I will place this. It is not urgent but I would like them to see her. Also she would need to take cta disc with her to that appointment. Let me know. Thanks, JAYNE Perla

## 2019-05-23 ENCOUNTER — LAB (OUTPATIENT)
Dept: LAB | Facility: HOSPITAL | Age: 64
End: 2019-05-23

## 2019-05-23 ENCOUNTER — OFFICE VISIT (OUTPATIENT)
Dept: CARDIOLOGY | Facility: CLINIC | Age: 64
End: 2019-05-23

## 2019-05-23 ENCOUNTER — OFFICE VISIT (OUTPATIENT)
Dept: NEUROSURGERY | Facility: CLINIC | Age: 64
End: 2019-05-23

## 2019-05-23 VITALS
DIASTOLIC BLOOD PRESSURE: 66 MMHG | HEIGHT: 67 IN | HEART RATE: 78 BPM | BODY MASS INDEX: 30.51 KG/M2 | SYSTOLIC BLOOD PRESSURE: 149 MMHG | WEIGHT: 194.4 LBS

## 2019-05-23 VITALS
DIASTOLIC BLOOD PRESSURE: 58 MMHG | WEIGHT: 194 LBS | HEIGHT: 66 IN | SYSTOLIC BLOOD PRESSURE: 132 MMHG | TEMPERATURE: 97.1 F | BODY MASS INDEX: 31.18 KG/M2

## 2019-05-23 DIAGNOSIS — E78.49 OTHER HYPERLIPIDEMIA: ICD-10-CM

## 2019-05-23 DIAGNOSIS — Z86.73 HISTORY OF CVA (CEREBROVASCULAR ACCIDENT): ICD-10-CM

## 2019-05-23 DIAGNOSIS — R73.03 PREDIABETES: ICD-10-CM

## 2019-05-23 DIAGNOSIS — I63.532 CEREBRAL INFARCTION DUE TO STENOSIS OF LEFT POSTERIOR CEREBRAL ARTERY (HCC): Primary | ICD-10-CM

## 2019-05-23 DIAGNOSIS — I10 ESSENTIAL HYPERTENSION: Primary | ICD-10-CM

## 2019-05-23 LAB
CHOLEST SERPL-MCNC: 136 MG/DL (ref 0–200)
HBA1C MFR BLD: 6.1 % (ref 4.8–5.6)
HDLC SERPL-MCNC: 49 MG/DL (ref 40–60)
LDLC SERPL CALC-MCNC: 71 MG/DL (ref 0–100)
LDLC/HDLC SERPL: 1.44 {RATIO}
TRIGL SERPL-MCNC: 81 MG/DL (ref 0–150)
VLDLC SERPL-MCNC: 16.2 MG/DL (ref 5–40)

## 2019-05-23 PROCEDURE — 99214 OFFICE O/P EST MOD 30 MIN: CPT | Performed by: INTERNAL MEDICINE

## 2019-05-23 PROCEDURE — 99204 OFFICE O/P NEW MOD 45 MIN: CPT | Performed by: RADIOLOGY

## 2019-05-23 PROCEDURE — 36415 COLL VENOUS BLD VENIPUNCTURE: CPT

## 2019-05-23 PROCEDURE — 80061 LIPID PANEL: CPT

## 2019-05-23 PROCEDURE — 83036 HEMOGLOBIN GLYCOSYLATED A1C: CPT

## 2019-05-23 RX ORDER — LOSARTAN POTASSIUM 100 MG/1
100 TABLET ORAL DAILY
Qty: 90 TABLET | Refills: 3
Start: 2019-05-23 | End: 2020-02-24 | Stop reason: SDUPTHER

## 2019-05-23 RX ORDER — CLOPIDOGREL BISULFATE 75 MG/1
75 TABLET ORAL DAILY
Qty: 30 TABLET | Refills: 11 | Status: SHIPPED | OUTPATIENT
Start: 2019-05-23 | End: 2020-08-17

## 2019-05-23 NOTE — PROGRESS NOTES
"NAME: DAVID CALIX   DOS: 2019  : 1955  PCP: Provider, No Known    Chief Complaint:    Chief Complaint   Patient presents with   • PCA stenosis     prior stroke       History of Present Illness:  63 y.o. female who suffered a prior left PCA vascular territory stroke in 2014.  She was found to have a \"high-grade\" left PCA stenosis, and was managed with a dual antiplatelet regimen, statin therapy, and a smoking cessation.  She is done very well since that time, with no new stroke or TIA-like symptoms.  However, she did have a recent CT angiogram for follow-up, and presents to my office for consultation regarding any possible role for neuro intervention with angioplasty/stent placement.  She is currently only on an aspirin antiplatelet and statin regimen, having been off Plavix for at least the past 6 months or so.    Past Medical History:  Past Medical History:   Diagnosis Date   • Hyperlipidemia    • Hypertension    • Migraines    • Occipital neuralgia of left side    • Stroke (CMS/HCC)        Past Surgical History:  Past Surgical History:   Procedure Laterality Date   • CATARACT EXTRACTION     • HYSTERECTOMY         Review of Systems:        Review of Systems   Neurological: Positive for headaches.   All other systems reviewed and are negative.       Medications    Current Outpatient Medications:   •  aspirin 81 MG tablet, Take 81 mg by mouth Daily., Disp: , Rfl:   •  atorvastatin (LIPITOR) 40 MG tablet, TAKE 1 TABLET BY MOUTH DAILY, Disp: 30 tablet, Rfl: 1  •  chlorthalidone (HYGROTON) 25 MG tablet, Take 1 tablet by mouth Daily., Disp: 90 tablet, Rfl: 3  •  fluticasone (FLONASE) 50 MCG/ACT nasal spray, Daily., Disp: , Rfl:   •  prochlorperazine (COMPAZINE) 5 MG tablet, Take 1-2 tablets by mouth Every 6 (Six) Hours As Needed for Nausea or Vomiting (migraine)., Disp: 20 tablet, Rfl: 5  •  verapamil SR (CALAN-SR) 240 MG CR tablet, TAKE 1 TABLET BY MOUTH NIGHTLY, Disp: 30 tablet, Rfl: 1  •  " clopidogrel (PLAVIX) 75 MG tablet, Take 1 tablet by mouth Daily., Disp: 30 tablet, Rfl: 11  •  losartan (COZAAR) 100 MG tablet, Take 1 tablet by mouth Daily., Disp: 90 tablet, Rfl: 3    Allergies:  Allergies   Allergen Reactions   • Erythromycin        Social Hx:  Social History     Tobacco Use   • Smoking status: Former Smoker     Last attempt to quit: 10/26/2014     Years since quittin.5   • Smokeless tobacco: Never Used   Substance Use Topics   • Alcohol use: Yes     Comment: occasional   • Drug use: No       Family Hx:  Family History   Problem Relation Age of Onset   • Heart disease Mother    • Stroke Mother    • Hypertension Mother    • Heart disease Father    • Hypertension Father    • Stroke Maternal Grandfather        Review of Imaging:  CT angiogram of the head dated 2019 from [facility] was reviewed along with the corresponding radiologic report.  Comparison is made to prior MR angiogram of the head dated 2014 from [facility].  Given differences in technique, there is a stable appearance of the high-grade stenosis involving the P2/3 portions of the left posterior cerebral artery.  No additional flow-limiting lesions are identified.  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    Physical Examination:  Vitals:    19 1343   BP: 132/58   Temp: 97.1 °F (36.2 °C)        General Appearance:   Well developed, well nourished, well groomed, alert, and cooperative.  Cardiovascular: Regular rate and rhythm. No carotid bruits      Neurological examination:  Neurologic Exam     Mental Status   Oriented to person, place, and time.   Speech: speech is normal   Level of consciousness: alert    Cranial Nerves   Cranial nerves II through XII intact.     Motor Exam     Strength   Strength 5/5 throughout.     Sensory Exam   Light touch normal.     Gait, Coordination, and Reflexes     Gait  Gait: normal      Diagnoses/Plan:     Ms. Reyes is a 63 y.o. female who experienced a left PCA vascular territory infarct in 2014, attributable to a left P2/3 PCA stenosis.  She has since quit smoking, and has been doing very well with an antiplatelet/statin regimen.  CT angiogram from May 2019 demonstrates a stable appearance of the left P2/3 PCA stenosis, with no new vascular abnormalities reported.  She has been doing fine for the past 6 months or so on only an aspirin and statin therapy, discontinuing Plavix.  The distal location of her left PCA stenosis is suboptimal for any neuro intervention with angioplasty/stent placement, and thus I would advocate for continued aggressive medical therapy.  She did inquire about adding restarting a Plavix, and I think this would certainly be beneficial from a stroke reduction standpoint, but similarly, she has done quite well for the past 6 months or so on on the aspirin therapy.  She is going to discuss adding back Plavix further with her cardiologist.  I be happy to see her back should she develop any new vascular abnormalities, but again the distal nature of her left PCA stenosis is best managed medically with antiplatelet/statin regimen.

## 2019-05-23 NOTE — PROGRESS NOTES
OFFICE FOLLOW UP     Date of Encounter:2019     Name: Agustina Reyes  : 1955  Address: [address] [place] [address]    PCP: Provider, No Known  [place] [id number]    Agustina Reyes is a 63 y.o. female.      Chief Complaint: Follow up of CVA, HTN    Problem List:   1. Left occipital  CVA with a possible LPCA stenosis by MRA, 2014  a. MRI Brain  w/o contrast, 2014:  Showing scattered foci of acute ischemia on left occipital lobe.    b. MRA of neck with and without contrast:  Focal area of the P1 segment of left posterior cerebral artery.    c. Echocardiogram, 2014, LVEF 60-65%.  No  thrombus in left atrial appendage. No PFO. Prominent Chiari network noted in right atrium.    d. Carotid duplex, 2014:  Showing no significant carotid artery stenosis bilaterally.     e. Medtronic loop recorder implant, 2014 (battery life , 2018)  f. Transcranial  Doppler, 2014:  Showing normal flow and wave form seen in MELODIE, NCA, terminal ICA, PCA.  2. Hypertension.   3. Pre-Diabetes:  a. Hemoglobin A1c 2016:  6.2   4. Exertional calf burning, possible claudification  5. History of migraines.   6. Hyperlipidemia.   7. Former tobacco use    Allergies:  Allergies   Allergen Reactions   • Erythromycin        Current Medications:  •  aspirin 81 MG tablet, Take 81 mg by mouth Daily  •  atorvastatin (LIPITOR) 40 MG tablet, TAKE 1 TABLET BY MOUTH DAILY  •  chlorthalidone (HYGROTON) 25 MG tablet, Take 1 tablet by mouth Daily  •  fluticasone (FLONASE) 50 MCG/ACT nasal spray, Daily.  •  losartan 50 mg by mouth Daily  •  prochlorperazine (COMPAZINE) 5 MG, Take 1-2 tablets by mouth Every 6 (Six) Hours As Needed for Nausea or Vomiting (migraine)  •  verapamil SR (CALAN-SR) 240 MG CR tablet, TAKE 1 TABLET BY MOUTH NIGHTLY    History of Present Illness: Agustina returns for follow-up today.  A neurology APRN recently carried out studies on the patient  "including MRI and CT of the head.  She was referred to Guevara Hall MD whom she saw her earlier today.  Dr. Hall suggested the addition of Plavix to her regimen.  He did not think that further studies or intervention (at this time) or warranted.  She has not had any symptoms of chest pain, TIA, or CVA.  She still works full-time at [employer].  Systolic blood pressures at home have usually been noted to be above 130.            The following portions of the patient's history were reviewed and updated as appropriate: allergies, current medications and problem list.    ROS: Pertinent positives as listed in the HPI.  All other systems reviewed and negative.    Objective:    Vitals:    05/23/19 1502 05/23/19 1503   BP: 142/64 149/66   BP Location: Right arm Right arm   Patient Position: Sitting Standing   Pulse: 72 78   Weight: 88.2 kg (194 lb 6.4 oz)    Height: 168.9 cm (66.5\")        Physical Exam:  GENERAL: Alert, cooperative, in no acute distress.   HEENT: Normocephalic, no adenopathy, no jugular venous distention  HEART: No discrete PMI is noted. Regular rhythm, normal rate, and no murmurs, gallops, or rubs.   LUNGS: Clear to auscultation bilaterally. No wheezing, rales or ronchi.  ABDOMEN: Soft, bowel sounds present, non-tender   NEUROLOGIC: No focal abnormalities involving strength or sensation are noted.   EXTREMITIES: No clubbing, cyanosis, or edema noted.     Diagnostic Data:    Lab Results   Component Value Date    TSH 2.045 12/17/2018     Lab Results   Component Value Date    GLUCOSE 86 12/17/2018    BUN 12 12/17/2018    CREATININE 0.78 12/17/2018    EGFRIFNONA 75 12/17/2018    BCR 15.4 12/17/2018    K 4.0 12/17/2018    CO2 28.0 12/17/2018    CALCIUM 9.5 12/17/2018    ALBUMIN 4.67 12/17/2018    AST 16 12/17/2018    ALT 17 12/17/2018     Lab Results   Component Value Date    WBC 8.48 12/17/2018    HGB 12.3 12/17/2018    HCT 38.2 12/17/2018    MCV 86.8 12/17/2018     12/17/2018 " "      Procedures      Assessment and Plan:   1.  History of CVA: See KAYY Hall MD note of this date. I agree that she should take lifelong LD ASA and will add Plavix, 75mg. po daily (although there is less date with this latter medication).   I will as well target a systolic blood pressure less than 130 and will increase losartan from 50 to 100 mg p.o. daily to this and.  When I tried to do this before the patient developed atypical leg pain that is not a reported side effect of losartan.  I discussed this with her and asked her to increase her losartan and to call me should any undue symptoms occur.  Because of her history of remote \"prediabetes\" and dyslipidemia under treatment I will check and optimize her hemoglobin A1c and cholesterol.  Lastly, the patient needs a primary care physician and I have recommended to him at the [facility].  2.  HTN: As noted above.  The patient is not on target.  I will increase her losartan from 50 to 100 mg p.o. Daily.  3. HLP: Target LDL is less than 70.  We will check a lipid panel and adjust her moderate dose atorvastatin if we need to.    I, Paco Bernal MD, [facility], [facility], personally performed the services described in this documentation as scribed by the above named individual in my presence, and it is both accurate and complete. At 4:28 PM on 05/23/2019        I will see Agustina Reyes back in one year or sooner on an as needed basis.        Scribed for Paco Bernal MD by Crystal Louis RN. 05/23/2019 3:03 PM.        EMR Dragon/Transcription Disclaimer:  Much of this encounter note is an electronic transcription/translation of spoken language to printed text.  The electronic translation of spoken language may permit erroneous, or at times, nonsensical words or phrases to be inadvertently transcribed.  Although I have reviewed the note for such errors, some may still exist.             "

## 2019-06-10 ENCOUNTER — OFFICE VISIT (OUTPATIENT)
Dept: INTERNAL MEDICINE | Facility: CLINIC | Age: 64
End: 2019-06-10

## 2019-06-10 VITALS
SYSTOLIC BLOOD PRESSURE: 122 MMHG | BODY MASS INDEX: 31.34 KG/M2 | HEIGHT: 66 IN | DIASTOLIC BLOOD PRESSURE: 56 MMHG | WEIGHT: 195 LBS | HEART RATE: 68 BPM

## 2019-06-10 DIAGNOSIS — E66.09 CLASS 1 OBESITY DUE TO EXCESS CALORIES WITH SERIOUS COMORBIDITY AND BODY MASS INDEX (BMI) OF 31.0 TO 31.9 IN ADULT: ICD-10-CM

## 2019-06-10 DIAGNOSIS — Z12.83 SKIN CANCER SCREENING: ICD-10-CM

## 2019-06-10 DIAGNOSIS — R73.03 PREDIABETES: ICD-10-CM

## 2019-06-10 DIAGNOSIS — Z12.39 BREAST CANCER SCREENING: ICD-10-CM

## 2019-06-10 DIAGNOSIS — G43.109 MIGRAINE WITH AURA AND WITHOUT STATUS MIGRAINOSUS, NOT INTRACTABLE: ICD-10-CM

## 2019-06-10 DIAGNOSIS — K62.5 RECTAL BLEEDING: ICD-10-CM

## 2019-06-10 DIAGNOSIS — I66.22 OCCLUSION AND STENOSIS OF LEFT POSTERIOR CEREBRAL ARTERY: ICD-10-CM

## 2019-06-10 DIAGNOSIS — R20.2 PARESTHESIAS: ICD-10-CM

## 2019-06-10 DIAGNOSIS — E78.2 MIXED HYPERLIPIDEMIA: ICD-10-CM

## 2019-06-10 DIAGNOSIS — Z12.11 COLON CANCER SCREENING: ICD-10-CM

## 2019-06-10 DIAGNOSIS — I10 ESSENTIAL HYPERTENSION: Primary | ICD-10-CM

## 2019-06-10 PROCEDURE — 99204 OFFICE O/P NEW MOD 45 MIN: CPT | Performed by: INTERNAL MEDICINE

## 2019-06-10 NOTE — PROGRESS NOTES
[facility]     Agustina Reyes  1955   [id number]      Patient Care Team:  Rich Zaragoza MD as PCP - Internal Medicine (Internal Medicine)  China Ivey APRN as Referring Physician (Neurology)    Chief Complaint::   Chief Complaint   Patient presents with   • Establish Care        HPI  Ms. Reyes is a 63-year-old white female, referred by Dr. Paco Bernal, who comes in to establish care.  She is here for follow-up of her prediabetes, hypertension, hyperlipidemia, history of stroke, and migraine.  Previously she was under the care of Dr. Anshul Gill.  She admits she has not had mammography in quite some time, has never had colonoscopy.  She recently saw Dr. Tolentino to review her cerebral anatomy, who felt that she should add clopidogrel to aspirin because of a cerebral artery stenosis.  She complains of some ongoing issues with allergic rhinitis, but this is mild.  She complains of leg cramps at night.  She also has diarrhea from time to time and when that happened she has rectal bleeding.  She thinks she might have a hemorrhoid.  She would like my opinion about adding clopidogrel she also wants to know what I think about pickle juice for her cramps.  She is also curious about CBD oil for anxiety, and insomnia.  Lastly, she experiences burning in her mid calves when she walks.  She does not have calf pain, but this is a fairly consistent symptom.  She admits that she is not exercising regularly.    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   • History of CVA (cerebrovascular accident)   • Prediabetes        Past Medical History:   Diagnosis Date   • Hyperlipidemia    • Hypertension    • Migraines    • Occipital  neuralgia of left side    • Stroke (CMS/HCC)        Past Surgical History:   Procedure Laterality Date   • CATARACT EXTRACTION     • HYSTERECTOMY         Family History   Problem Relation Age of Onset   • Heart disease Mother    • Stroke Mother    • Hypertension Mother    • Heart disease Father    • Hypertension Father    • Stroke Maternal Grandfather        Social History     Socioeconomic History   • Marital status: Single     Spouse name: Not on file   • Number of children: Not on file   • Years of education: Not on file   • Highest education level: Not on file   Tobacco Use   • Smoking status: Former Smoker     Last attempt to quit: 10/26/2014     Years since quittin.6   • Smokeless tobacco: Never Used   Substance and Sexual Activity   • Alcohol use: Yes     Comment: occasional   • Drug use: No   • Sexual activity: Defer       Allergies   Allergen Reactions   • Erythromycin          Current Outpatient Medications:   •  aspirin 81 MG tablet, Take 81 mg by mouth Daily., Disp: , Rfl:   •  atorvastatin (LIPITOR) 40 MG tablet, TAKE 1 TABLET BY MOUTH DAILY, Disp: 30 tablet, Rfl: 1  •  chlorthalidone (HYGROTON) 25 MG tablet, Take 1 tablet by mouth Daily., Disp: 90 tablet, Rfl: 3  •  fluticasone (FLONASE) 50 MCG/ACT nasal spray, Daily., Disp: , Rfl:   •  losartan (COZAAR) 100 MG tablet, Take 1 tablet by mouth Daily., Disp: 90 tablet, Rfl: 3  •  prochlorperazine (COMPAZINE) 5 MG tablet, Take 1-2 tablets by mouth Every 6 (Six) Hours As Needed for Nausea or Vomiting (migraine)., Disp: 20 tablet, Rfl: 5  •  verapamil SR (CALAN-SR) 240 MG CR tablet, TAKE 1 TABLET BY MOUTH NIGHTLY, Disp: 30 tablet, Rfl: 1  •  clopidogrel (PLAVIX) 75 MG tablet, Take 1 tablet by mouth Daily., Disp: 30 tablet, Rfl: 11    Review of Systems   Constitutional: Negative for chills, fatigue and fever.   HENT: Positive for congestion. Negative for ear pain and sinus pressure.    Respiratory: Negative for cough, chest tightness, shortness of breath  "and wheezing.    Cardiovascular: Negative for chest pain and palpitations.   Gastrointestinal: Negative for abdominal pain, blood in stool and constipation.   Skin: Negative for color change.   Allergic/Immunologic: Negative for environmental allergies.   Neurological: Positive for headache. Negative for dizziness and speech difficulty.   Psychiatric/Behavioral: Negative for decreased concentration. The patient is not nervous/anxious.         Vital Signs  Vitals:    06/10/19 1500   BP: 122/56   BP Location: Left arm   Patient Position: Sitting   Cuff Size: Adult   Pulse: 68   Weight: 88.5 kg (195 lb)   Height: 168.9 cm (66.5\")       Physical Exam   Constitutional: She appears well-developed and well-nourished. She appears overweight.   Cardiovascular:   Pulses:       Dorsalis pedis pulses are 1+ on the right side, and 1+ on the left side.   Musculoskeletal: She exhibits no edema.   Skin: Skin is warm and dry.   Psychiatric: She has a normal mood and affect. Her behavior is normal. Judgment and thought content normal.      Procedures    ACE III MINI             Assessment/Plan:    Agustina was seen today for establish care.    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  She is referred for colonoscopy.  She is also due for mammography which is also scheduled.    Recent lipid panel revealed LDL 71, HDL 49, well controlled on atorvastatin high dose.    Migraine is controlled.    Discussed at length rationale for adding clopidogrel to aspirin for her cerebrovascular disease to prevent stroke.  She will begin clopidogrel.  Also emphasized the importance of anti-inflammatory diet, weight loss and aggressive risk factor modification as she is already doing.    A1c was 6.10.  The treatment at this point remains diet and exercise, but if her A1c increases, would consider adding metformin.    BMI is 31, discussed the importance of weight control, maintenance of muscle strength, and healthy diet and reducing the risk of future coronary and cerebrovascular events.    Exertional symptoms in her lower extremities certainly sound like claudication, but she does have intact pulses.  At this point I have encouraged her to begin exercising regularly and if she cannot exercise through the symptoms, would obtain JAMIE index.      Plan of care reviewed with patient at the conclusion of today's visit. Education was provided regarding diagnosis, management, and any prescribed or recommended OTC medications.Patient verbalizes understanding of and agreement with management plan.         Rich Zaragoza MD

## 2019-06-18 RX ORDER — SODIUM, POTASSIUM,MAG SULFATES 17.5-3.13G
2 SOLUTION, RECONSTITUTED, ORAL ORAL TAKE AS DIRECTED
Qty: 354 ML | Refills: 0 | Status: SHIPPED | OUTPATIENT
Start: 2019-06-18 | End: 2020-06-23

## 2019-07-02 ENCOUNTER — LAB REQUISITION (OUTPATIENT)
Dept: LAB | Facility: HOSPITAL | Age: 64
End: 2019-07-02

## 2019-07-02 ENCOUNTER — OUTSIDE FACILITY SERVICE (OUTPATIENT)
Dept: GASTROENTEROLOGY | Facility: CLINIC | Age: 64
End: 2019-07-02

## 2019-07-02 DIAGNOSIS — Z83.71 FAMILY HISTORY OF COLONIC POLYPS: ICD-10-CM

## 2019-07-02 DIAGNOSIS — Z12.11 ENCOUNTER FOR SCREENING FOR MALIGNANT NEOPLASM OF COLON: ICD-10-CM

## 2019-07-02 PROCEDURE — 45385 COLONOSCOPY W/LESION REMOVAL: CPT | Performed by: INTERNAL MEDICINE

## 2019-07-02 PROCEDURE — 99203 OFFICE O/P NEW LOW 30 MIN: CPT | Performed by: INTERNAL MEDICINE

## 2019-07-02 PROCEDURE — 88305 TISSUE EXAM BY PATHOLOGIST: CPT | Performed by: INTERNAL MEDICINE

## 2019-07-03 LAB
CYTO UR: NORMAL
LAB AP CASE REPORT: NORMAL
LAB AP CLINICAL INFORMATION: NORMAL
PATH REPORT.FINAL DX SPEC: NORMAL
PATH REPORT.GROSS SPEC: NORMAL

## 2019-07-05 RX ORDER — VERAPAMIL HYDROCHLORIDE 240 MG/1
TABLET, FILM COATED, EXTENDED RELEASE ORAL
Qty: 90 TABLET | Refills: 1 | Status: SHIPPED | OUTPATIENT
Start: 2019-07-05 | End: 2019-12-27

## 2019-07-05 RX ORDER — ATORVASTATIN CALCIUM 40 MG/1
TABLET, FILM COATED ORAL
Qty: 90 TABLET | Refills: 1 | Status: SHIPPED | OUTPATIENT
Start: 2019-07-05 | End: 2019-12-27

## 2019-07-30 ENCOUNTER — HOSPITAL ENCOUNTER (OUTPATIENT)
Dept: MAMMOGRAPHY | Facility: HOSPITAL | Age: 64
Discharge: HOME | End: 2019-07-30
Admitting: INTERNAL MEDICINE

## 2019-07-30 DIAGNOSIS — Z12.39 BREAST CANCER SCREENING: ICD-10-CM

## 2019-07-30 PROCEDURE — 77063 BREAST TOMOSYNTHESIS BI: CPT

## 2019-07-30 PROCEDURE — 77063 BREAST TOMOSYNTHESIS BI: CPT | Performed by: RADIOLOGY

## 2019-07-30 PROCEDURE — 77067 SCR MAMMO BI INCL CAD: CPT

## 2019-07-30 PROCEDURE — 77067 SCR MAMMO BI INCL CAD: CPT | Performed by: RADIOLOGY

## 2019-08-28 ENCOUNTER — TELEPHONE (OUTPATIENT)
Dept: GASTROENTEROLOGY | Facility: CLINIC | Age: 64
End: 2019-08-28

## 2019-08-28 NOTE — TELEPHONE ENCOUNTER
Spoke with Patient about her bill from 7/2/19. I explained that Dr. Busby billed for a consult and the procedure was diagnostic when there was mention of symptoms. A Screening or Preventative Procedure is only looking to prevent colon cancer. Pt understood but was under the impression that everything was going to be covered.

## 2019-09-30 RX ORDER — CHLORTHALIDONE 25 MG/1
25 TABLET ORAL DAILY
Qty: 90 TABLET | Refills: 0 | Status: SHIPPED | OUTPATIENT
Start: 2019-09-30 | End: 2019-12-27

## 2019-10-31 ENCOUNTER — OFFICE VISIT (OUTPATIENT)
Dept: NEUROLOGY | Facility: CLINIC | Age: 64
End: 2019-10-31

## 2019-10-31 VITALS
HEIGHT: 66 IN | OXYGEN SATURATION: 95 % | DIASTOLIC BLOOD PRESSURE: 70 MMHG | HEART RATE: 94 BPM | BODY MASS INDEX: 31.66 KG/M2 | WEIGHT: 197 LBS | SYSTOLIC BLOOD PRESSURE: 134 MMHG

## 2019-10-31 DIAGNOSIS — Z86.73 HISTORY OF CVA (CEREBROVASCULAR ACCIDENT): ICD-10-CM

## 2019-10-31 DIAGNOSIS — G43.109 MIGRAINE WITH AURA AND WITHOUT STATUS MIGRAINOSUS, NOT INTRACTABLE: Primary | ICD-10-CM

## 2019-10-31 DIAGNOSIS — I66.22 OCCLUSION AND STENOSIS OF LEFT POSTERIOR CEREBRAL ARTERY: ICD-10-CM

## 2019-10-31 PROCEDURE — 99213 OFFICE O/P EST LOW 20 MIN: CPT | Performed by: NURSE PRACTITIONER

## 2019-10-31 RX ORDER — BUTALBITAL/ACETAMINOPHEN 50MG-325MG
1-2 TABLET ORAL EVERY 6 HOURS PRN
Qty: 15 EACH | Refills: 0 | Status: SHIPPED | OUTPATIENT
Start: 2019-10-31

## 2019-10-31 NOTE — PROGRESS NOTES
Subjective:     Patient ID: Agustina Reyes is a 64 y.o. female.    CC:   Chief Complaint   Patient presents with   • Migraine       HPI:   History of Present Illness   This is a very pleasant 63-year-old female who presents for 6 month follow up on migraine with aura for several years. Diagnosed with migraines at age 24 with aura. She tells me that she always gets a visual aura with spots and flashing lights in her left side of her vision which will last 20 or 30 minutes and then she has a throbbing headache with nausea and photophobia. She is having about 1 migraine a month but it lasts 5-7 days. No debilitating. Takes 2 tylenol a day for a week max. Has not used the compazine or Fioricet but has both. She has previously tried Imitrex, Relpax, Topamax, Depakote and Cafergot which caused severe nausea and vomiting. She did have a repeat MRI of the brain with and without contrast 11/30/2018 and the imaging and radiology report reviewed today in office.  This does show 2 old strokes in the left occipital region which appear to be the same as her 2014 imaging, a few punctate chronic white matter changes and no significant atrophy noted.  MRI of the brain appears to remain stable from 2014 imaging. Had repeat CTA brain 5/2019 showing left PCA stenosis-Dr. Hall consulted with Neurosurgery and compared 2014 MRA brain and felt this was stable 5/2019. She has been on aspirin and Lipitor, off Plavix x 6 months with no recurrent events. She discussed benefit of adding back Plavix for stroke prevention, saw Dr. Bernal with Cardiology same day 5/23/19 and Plavix was restarted-she has not started taking it again. No recurrent stroke symptoms. Had recent colonoscopy. Denies bleeding. Has new PCP. HTN, HLD being better controlled. Non-smoker now-quit after stroke 2014. Hgba1c 6.1%. Scheduled to see Cardiology 11/2019 and PCP 12/2019.     Prior significant history: Unfortunately in August 2014 she had a severe migraine for 2 days  and then on the third day she developed right-sided weakness and paresthesias.  She was admitted to [facility] and was found to have a few left occipital strokes.  She also had an MRA of the head which did show some focal stenosis in the left PCA distribution. MRA neck unremarkable. She was treated with aspirin, Plavix and Lipitor. Negative loop recorder.  She tells me she underwent a loop recorder, echo and carotid duplex.  She's been followed by cardiology Dr. Paco Bernal long-term.  She does take verapamil which she has been on for 30+ years at the current dose.  She does continue to take Lipitor and aspirin & plans to restart the Plavix.     The following portions of the patient's history were reviewed and updated as appropriate: allergies, current medications, past family history, past medical history, past social history, past surgical history and problem list.    Past Medical History:   Diagnosis Date   • Hyperlipidemia    • Hypertension    • Migraines    • Occipital neuralgia of left side    • Stroke (CMS/HCC)        Past Surgical History:   Procedure Laterality Date   • CATARACT EXTRACTION     • HYSTERECTOMY         Social History     Socioeconomic History   • Marital status: Single     Spouse name: Not on file   • Number of children: Not on file   • Years of education: Not on file   • Highest education level: Not on file   Tobacco Use   • Smoking status: Former Smoker     Last attempt to quit: 10/26/2014     Years since quittin.0   • Smokeless tobacco: Never Used   Substance and Sexual Activity   • Alcohol use: Yes     Comment: occasional   • Drug use: No   • Sexual activity: Defer       Family History   Problem Relation Age of Onset   • Heart disease Mother    • Stroke Mother    • Hypertension Mother    • Heart disease Father    • Hypertension Father    • Stroke Maternal Grandfather    • Breast cancer Neg Hx    • Ovarian cancer Neg Hx         Review of Systems   Constitutional:  Negative for chills, fatigue, fever and unexpected weight change.   HENT: Negative for ear pain, hearing loss, nosebleeds, rhinorrhea and sore throat.    Eyes: Negative for photophobia, pain, discharge, itching and visual disturbance.   Respiratory: Negative for cough, chest tightness, shortness of breath and wheezing.    Cardiovascular: Negative for chest pain, palpitations and leg swelling.   Gastrointestinal: Positive for nausea. Negative for abdominal pain, blood in stool, constipation, diarrhea and vomiting.   Genitourinary: Negative for dysuria, frequency, hematuria and urgency.   Musculoskeletal: Negative for arthralgias, back pain, gait problem, joint swelling, myalgias, neck pain and neck stiffness.   Skin: Negative for rash and wound.   Allergic/Immunologic: Negative for environmental allergies and food allergies.   Neurological: Positive for dizziness, light-headedness and headaches. Negative for tremors, seizures, syncope, speech difficulty, weakness and numbness.   Hematological: Negative for adenopathy. Does not bruise/bleed easily.   Psychiatric/Behavioral: Negative for agitation, confusion, decreased concentration, hallucinations, sleep disturbance and suicidal ideas. The patient is not nervous/anxious.    All other systems reviewed and are negative.       Objective:    Neurologic Exam  Mental Status   Oriented to person, place, and time.   Level of consciousness: alert     Cranial Nerves   Cranial nerves II through XII intact.      Motor Exam   Muscle bulk: normal  Overall muscle tone: normal     Strength   Strength 5/5 except as noted.   Right strength: Mild RUE weakness chronic since CVA     Gait, Coordination, and Reflexes      Gait  Gait: normal     Coordination   Finger to nose coordination: normal     Tremor   Resting tremor: absent  Intention tremor: absent  Action tremor: absent     Reflexes   Right brachioradialis: 2+  Left brachioradialis: 2+  Right biceps: 2+  Left biceps: 2+  Right  triceps: 2+  Left triceps: 2+  Right : 2+  Left : 2+     Physical Exam  Constitutional: She is oriented to person, place, and time.   Neurological: She is oriented to person, place, and time. She has a normal Finger-Nose-Finger Test. Gait normal.   Reflex Scores:       Tricep reflexes are 2+ on the right side and 2+ on the left side.       Bicep reflexes are 2+ on the right side and 2+ on the left side.       Brachioradialis reflexes are 2+ on the right side and 2+ on the left side.    Assessment/Plan:       Agustina was seen today for migraine.    Diagnoses and all orders for this visit:    Migraine with aura and without status migrainosus, not intractable  Comments:  compazine and tylenol PRN  Orders:  -     butalbital-acetaminophen  MG tablet tablet; Take 1-2 tablets by mouth Every 6 (Six) Hours As Needed (migraines).    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 No recurrent stroke symptoms in over 11 months on Aspirin and Statin alone. She can go ahead and restart the Plavix since this was prescribed by Cardiology, but with her doing so well could consider waiting. She is scheduled to see Cardiology next month and can discuss. No bleeding risks for her but newer studies suggest higher bleeding risk with DAPT with less benefits long term but with stenosis of Left PCA the benefits would likely outweigh the risks at this point. She does not want to be on preventive meds for migraines. Encouraged her to use the compazine and fioricet as this would likely stop her migraines and not last a whole week. triptans contraindicated. F/U in 6 months or sooner if needed. Reviewed medications, potential side effects and signs and symptoms to report. Discussed risk versus benefits of treatment plan with patient and/or family-including  medications, labs and radiology that may be ordered. Addressed questions and concerns during visit. Patient and/or family verbalized understanding and agree with plan.    During this visit the following were done:  Labs Reviewed []    Labs Ordered []    Radiology Reports Reviewed [x]    Radiology Ordered []    PCP Records Reviewed []    Referring Provider Records Reviewed []    ER Records Reviewed []    Hospital Records Reviewed []    History Obtained From Family []    Radiology Images Reviewed []    Other Reviewed [x]  Cardiology, pcp and neurosurgery notes  Records Requested []      EMR Dragon/Transcription Disclaimer:  Much of this encounter note is an electronic transcription of spoken language to printed text. Electronic transcription of spoken language may permit erroneous words or phrases to be inadvertently transcribed. Although I have reviewed the note for such errors, some may still exist in this documentation.      China Ivey, APRN  10/31/2019

## 2019-12-04 ENCOUNTER — OFFICE VISIT (OUTPATIENT)
Dept: NEUROLOGY | Facility: CLINIC | Age: 64
End: 2019-12-04

## 2019-12-04 ENCOUNTER — APPOINTMENT (OUTPATIENT)
Dept: LAB | Facility: HOSPITAL | Age: 64
End: 2019-12-04

## 2019-12-04 VITALS
HEIGHT: 66 IN | WEIGHT: 199 LBS | HEART RATE: 95 BPM | SYSTOLIC BLOOD PRESSURE: 130 MMHG | BODY MASS INDEX: 31.98 KG/M2 | OXYGEN SATURATION: 95 % | DIASTOLIC BLOOD PRESSURE: 70 MMHG

## 2019-12-04 DIAGNOSIS — G43.109 MIGRAINE WITH AURA AND WITHOUT STATUS MIGRAINOSUS, NOT INTRACTABLE: ICD-10-CM

## 2019-12-04 DIAGNOSIS — I66.22 OCCLUSION AND STENOSIS OF LEFT POSTERIOR CEREBRAL ARTERY: ICD-10-CM

## 2019-12-04 DIAGNOSIS — R51.0 POSITIONAL HEADACHE: Primary | ICD-10-CM

## 2019-12-04 DIAGNOSIS — Z86.73 HISTORY OF CVA (CEREBROVASCULAR ACCIDENT): ICD-10-CM

## 2019-12-04 LAB
BASOPHILS # BLD AUTO: 0.08 10*3/MM3 (ref 0–0.2)
BASOPHILS NFR BLD AUTO: 1 % (ref 0–1.5)
DEPRECATED RDW RBC AUTO: 41.7 FL (ref 37–54)
EOSINOPHIL # BLD AUTO: 0.24 10*3/MM3 (ref 0–0.4)
EOSINOPHIL NFR BLD AUTO: 3.1 % (ref 0.3–6.2)
ERYTHROCYTE [DISTWIDTH] IN BLOOD BY AUTOMATED COUNT: 13.2 % (ref 12.3–15.4)
HCT VFR BLD AUTO: 35.5 % (ref 34–46.6)
HGB BLD-MCNC: 11.8 G/DL (ref 12–15.9)
IMM GRANULOCYTES # BLD AUTO: 0.03 10*3/MM3 (ref 0–0.05)
IMM GRANULOCYTES NFR BLD AUTO: 0.4 % (ref 0–0.5)
LYMPHOCYTES # BLD AUTO: 2.43 10*3/MM3 (ref 0.7–3.1)
LYMPHOCYTES NFR BLD AUTO: 31.9 % (ref 19.6–45.3)
MCH RBC QN AUTO: 29 PG (ref 26.6–33)
MCHC RBC AUTO-ENTMCNC: 33.2 G/DL (ref 31.5–35.7)
MCV RBC AUTO: 87.2 FL (ref 79–97)
MONOCYTES # BLD AUTO: 0.54 10*3/MM3 (ref 0.1–0.9)
MONOCYTES NFR BLD AUTO: 7.1 % (ref 5–12)
NEUTROPHILS # BLD AUTO: 4.3 10*3/MM3 (ref 1.7–7)
NEUTROPHILS NFR BLD AUTO: 56.5 % (ref 42.7–76)
NRBC BLD AUTO-RTO: 0 /100 WBC (ref 0–0.2)
PLATELET # BLD AUTO: 250 10*3/MM3 (ref 140–450)
PMV BLD AUTO: 9.8 FL (ref 6–12)
RBC # BLD AUTO: 4.07 10*6/MM3 (ref 3.77–5.28)
WBC NRBC COR # BLD: 7.62 10*3/MM3 (ref 3.4–10.8)

## 2019-12-04 PROCEDURE — 86140 C-REACTIVE PROTEIN: CPT | Performed by: NURSE PRACTITIONER

## 2019-12-04 PROCEDURE — 36415 COLL VENOUS BLD VENIPUNCTURE: CPT | Performed by: NURSE PRACTITIONER

## 2019-12-04 PROCEDURE — 99214 OFFICE O/P EST MOD 30 MIN: CPT | Performed by: NURSE PRACTITIONER

## 2019-12-04 PROCEDURE — 86038 ANTINUCLEAR ANTIBODIES: CPT | Performed by: NURSE PRACTITIONER

## 2019-12-04 PROCEDURE — 85025 COMPLETE CBC W/AUTO DIFF WBC: CPT | Performed by: NURSE PRACTITIONER

## 2019-12-04 PROCEDURE — 80053 COMPREHEN METABOLIC PANEL: CPT | Performed by: NURSE PRACTITIONER

## 2019-12-04 PROCEDURE — 85652 RBC SED RATE AUTOMATED: CPT | Performed by: NURSE PRACTITIONER

## 2019-12-04 RX ORDER — PREDNISONE 10 MG/1
TABLET ORAL
Qty: 42 TABLET | Refills: 0 | Status: SHIPPED | OUTPATIENT
Start: 2019-12-04 | End: 2020-06-23

## 2019-12-04 NOTE — PROGRESS NOTES
Subjective:     Patient ID: Agustina Reyes is a 64 y.o. female.    CC:   Chief Complaint   Patient presents with   • Migraine       HPI:   History of Present Illness   This is a very pleasant 64-year-old female who presents for her appointment for 3 weeks and persistent right parietal throbbing, constant headache.  She tells me she bent over to pick something up and this headache began.  She tells me this is not stabbing.  She tells me there is some nausea but no vomiting.  She has no photophobia or phonophobia.  She does tell me that she has some stress but no more than usual since she has been working at [employer] for over 40+ years and everything is as usual for the holiday season.  She tells me she is been taking Tylenol daily at thousand milligrams up to 3 times a day with some mild relief.  She denies any weakness, numbness, tingling, confusion, difficulty with speech or severe headache.  She tells me that it is a constant throbbing.  She does notice some tightness in the back of the right side of her neck.  She also feels like she is congested somewhat and she has tenderness in her sinus region.  She denies any fever, any chills, any injuries, any stroke symptoms and no significant changes in health.  She did restart Flonase but has not been using this consistently.  She has previously had a lot of issues with sinus pain and pressure but this was better in the fall.  She did take a butalbital with Tylenol 1 day and this did not help at all and then yesterday she took 2 but felt like she could not function or go to work due to the drowsiness.  She does not feel that it was very helpful for her headache.  She has taken the Compazine but has not found this helpful either.  Her typical migraines are always on the left side and this is different because it is the right side. She is on Lipitor, Plavix and Aspirin for 2nd stroke prevention with known Left PCA stenosis.    Normally followed every 3-6 months for  migraine with aura for several years. Diagnosed with migraines at age 24 with aura. She tells me that she always gets a visual aura with spots and flashing lights in her left side of her vision which will last 20 or 30 minutes and then she has a throbbing headache with nausea and photophobia. She is having about 1 migraine a month but it lasts 5-7 days. No debilitating. Takes 2 tylenol a day for a week max. Has not used the compazine or Fioricet but has both. She has previously tried Imitrex, Relpax, Topamax, Depakote and Cafergot which caused severe nausea and vomiting. She did have a repeat MRI of the brain with and without contrast 11/30/2018 and the imaging and radiology report reviewed previously.  This does show 2 old strokes in the left occipital region which appear to be the same as her 2014 imaging, a few punctate chronic white matter changes and no significant atrophy noted.  MRI of the brain appears to remain stable from 2014 imaging. Had repeat CTA brain 5/2019 showing left PCA stenosis-Dr. Hall consulted with Neurosurgery and compared 2014 MRA brain and felt this was stable 5/2019. She has been on aspirin and Lipitor, off Plavix x 6 months with no recurrent events. Had recent colonoscopy. Denies bleeding. Has new PCP. HTN, HLD being better controlled. Non-smoker now-quit after stroke 2014. Hgba1c 6.1%.      Prior significant history: Unfortunately in August 2014 she had a severe migraine for 2 days and then on the third day she developed right-sided weakness and paresthesias.  She was admitted to [facility] and was found to have a few left occipital strokes.  She also had an MRA of the head which did show some focal stenosis in the left PCA distribution. MRA neck unremarkable. She was treated with aspirin, Plavix and Lipitor. Negative loop recorder.  She tells me she underwent a loop recorder, echo and carotid duplex.  She's been followed by cardiology Dr. Paco Bernal long-term.  She  does take verapamil which she has been on for 30+ years at the current dose.     The following portions of the patient's history were reviewed and updated as appropriate: allergies, current medications, past family history, past medical history, past social history, past surgical history and problem list.    Past Medical History:   Diagnosis Date   • Hyperlipidemia    • Hypertension    • Migraines    • Occipital neuralgia of left side    • Stroke (CMS/HCC)        Past Surgical History:   Procedure Laterality Date   • CATARACT EXTRACTION     • HYSTERECTOMY         Social History     Socioeconomic History   • Marital status: Single     Spouse name: Not on file   • Number of children: Not on file   • Years of education: Not on file   • Highest education level: Not on file   Tobacco Use   • Smoking status: Former Smoker     Last attempt to quit: 10/26/2014     Years since quittin.1   • Smokeless tobacco: Never Used   Substance and Sexual Activity   • Alcohol use: Yes     Comment: occasional   • Drug use: No   • Sexual activity: Defer       Family History   Problem Relation Age of Onset   • Heart disease Mother    • Stroke Mother    • Hypertension Mother    • Heart disease Father    • Hypertension Father    • Stroke Maternal Grandfather    • Breast cancer Neg Hx    • Ovarian cancer Neg Hx         Review of Systems   Constitutional: Negative for chills, fatigue, fever and unexpected weight change.   HENT: Negative for ear pain, hearing loss, nosebleeds, rhinorrhea and sore throat.    Eyes: Negative for photophobia, pain, discharge, itching and visual disturbance.   Respiratory: Negative for cough, chest tightness, shortness of breath and wheezing.    Cardiovascular: Negative for chest pain, palpitations and leg swelling.   Gastrointestinal: Negative for abdominal pain, blood in stool, constipation, diarrhea, nausea and vomiting.   Genitourinary: Negative for dysuria, frequency, hematuria and urgency.  "  Musculoskeletal: Negative for arthralgias, back pain, gait problem, joint swelling, myalgias, neck pain and neck stiffness.   Skin: Negative for rash and wound.   Allergic/Immunologic: Negative for environmental allergies and food allergies.   Neurological: Positive for headaches. Negative for dizziness, tremors, seizures, syncope, speech difficulty, weakness, light-headedness and numbness.        Not sure it is a migraine, throbbing constant   Hematological: Negative for adenopathy. Does not bruise/bleed easily.   Psychiatric/Behavioral: Negative for agitation, confusion, decreased concentration, hallucinations, sleep disturbance and suicidal ideas. The patient is not nervous/anxious.    All other systems reviewed and are negative.       Objective:  /70   Pulse 95   Ht 167.6 cm (66\")   Wt 90.3 kg (199 lb)   SpO2 95%   BMI 32.12 kg/m²     Neurologic Exam     Mental Status   Oriented to person, place, and time.   Speech: speech is normal   Level of consciousness: alert    Cranial Nerves   Cranial nerves II through XII intact.     Motor Exam   Muscle bulk: normal  Overall muscle tone: normal    Strength   Strength 5/5 except as noted.   Right strength: Mild RUE weakness chronic since CVA    Gait, Coordination, and Reflexes     Gait  Gait: normal    Coordination   Romberg: negative  Finger to nose coordination: normal  Heel to shin coordination: normal  Tandem walking coordination: normal    Tremor   Resting tremor: absent  Intention tremor: absent  Action tremor: absent    Reflexes   Right brachioradialis: 2+  Left brachioradialis: 2+  Right biceps: 2+  Left biceps: 2+  Right triceps: 2+  Left triceps: 2+  Right patellar: 2+  Left patellar: 2+  Right achilles: 2+  Left achilles: 2+  Right : 2+  Left : 2+      Physical Exam   Constitutional: She is oriented to person, place, and time.   HENT:   Head: Normocephalic and atraumatic.   Right Ear: Hearing, tympanic membrane, external ear and ear canal " normal.   Left Ear: Hearing, tympanic membrane, external ear and ear canal normal.   Nose: Mucosal edema present. No rhinorrhea. Right sinus exhibits maxillary sinus tenderness and frontal sinus tenderness. Left sinus exhibits maxillary sinus tenderness and frontal sinus tenderness.   Mouth/Throat: Uvula is midline and mucous membranes are normal. Posterior oropharyngeal erythema present. No oropharyngeal exudate, posterior oropharyngeal edema or tonsillar abscesses.   Eyes:   Fundoscopic exam:       The right eye shows no papilledema. The right eye shows red reflex.        The left eye shows no papilledema. The left eye shows red reflex.   Neck: Muscular tenderness (right SCM) present. No spinous process tenderness present. No neck rigidity. No edema, no erythema and normal range of motion present.   Neurological: She is oriented to person, place, and time. She has a normal Finger-Nose-Finger Test, a normal Heel to Shin Test, a normal Romberg Test and a normal Tandem Gait Test. Gait normal.   Reflex Scores:       Tricep reflexes are 2+ on the right side and 2+ on the left side.       Bicep reflexes are 2+ on the right side and 2+ on the left side.       Brachioradialis reflexes are 2+ on the right side and 2+ on the left side.       Patellar reflexes are 2+ on the right side and 2+ on the left side.       Achilles reflexes are 2+ on the right side and 2+ on the left side.  Psychiatric: Her speech is normal and behavior is normal. Judgment and thought content normal. Her mood appears anxious. Cognition and memory are normal.       Assessment/Plan:       Agustina was seen today for migraine.    Diagnoses and all orders for this visit:    Positional headache  Comments:  likely migraine/tension with sinus component. flonase BID x 1 wk then QD. Steroid. No red flag signs. Labs today. If not better nxt wk to call for rpt cta head  Orders:  -     C-reactive Protein; Future  -     Sedimentation Rate; Future  -     AIMEE by  IFA, Reflex 9-biomarkers profile; Future  -     Comprehensive Metabolic Panel; Future  -     CBC & Differential; Future  -     predniSONE (DELTASONE) 10 MG tablet; Take 6 tabs x 2 days, Take 5 tabs x 2 days, take 4 tabs x 2 days, take 3 tabs x 2 days, take 2 tabs x 2 days and 1 tab x 2 days and stop  -     C-reactive Protein  -     Sedimentation Rate  -     AIMEE by IFA, Reflex 9-biomarkers profile  -     Comprehensive Metabolic Panel  -     CBC & Differential  -     CBC Auto Differential    Migraine with aura and without status migrainosus, not intractable  Comments:  continue verapamil for prevention. bupap PRN.    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 Max 3500mg/day of acetaminophen.  I do not see any red flag signs or symptoms today with her headache.  This could be tension with sinus component.  This did start with bending over which she tells me has happened in the past with sinus issues.  I will do labs today.  I have given her a steroid.  If she is not better by next week I want her to call us for repeat CTA of the head.  If she develops any red flag symptoms, thunderclap headache, numbness, tingling, confusion difficulty with speech or she is not improving have encouraged her to go to the ER for further evaluation.  I am happy to order the CTA of the head at any time if she is not improving.  Continue aspirin, Plavix and statin as prescribed for stroke prevention as well as known PCA stenosis on the left side.  Follow-up in 6 weeks or sooner if needed. Reviewed medications, potential side effects and signs and symptoms to report. Discussed risk versus benefits of treatment plan with patient and/or family-including medications, labs and radiology that may be ordered. Addressed  questions and concerns during visit. Patient and/or family verbalized understanding and agree with plan.    Discussed signs and symptoms of stroke and when to call 911. Instructed to follow a low fat diet including the Mediterranean diet. Instructed to take all medications daily as prescribed. Encouraged 30 minutes of exercise 3-4 times a week as tolerated. Stay well hydrated. Discussed potential side effects of new medications and signs and symptoms to report. If patient is currently using tobacco products, smoking cessation was encouraged. Reviewed stroke risk factors and stroke prevention plan. Patient and/or family verbalizes understanding and agrees with plan.     During this visit the following were done:  Labs Reviewed [x]   crp mild elevation 4/2019  Labs Ordered [x]    Radiology Reports Reviewed [x]    Radiology Ordered []    PCP Records Reviewed []    Referring Provider Records Reviewed []    ER Records Reviewed []    Hospital Records Reviewed []    History Obtained From Family []    Radiology Images Reviewed []    Other Reviewed []    Records Requested []      EMR Dragon/Transcription Disclaimer:  Much of this encounter note is an electronic transcription of spoken language to printed text. Electronic transcription of spoken language may permit erroneous words or phrases to be inadvertently transcribed. Although I have reviewed the note for such errors, some may still exist in this documentation.      China Ivey, APRN  12/4/2019

## 2019-12-05 LAB
ALBUMIN SERPL-MCNC: 4.6 G/DL (ref 3.5–5.2)
ALBUMIN/GLOB SERPL: 1.5 G/DL
ALP SERPL-CCNC: 63 U/L (ref 39–117)
ALT SERPL W P-5'-P-CCNC: 17 U/L (ref 1–33)
ANA SER QL IA: NEGATIVE
ANION GAP SERPL CALCULATED.3IONS-SCNC: 11.4 MMOL/L (ref 5–15)
AST SERPL-CCNC: 13 U/L (ref 1–32)
BILIRUB SERPL-MCNC: 0.3 MG/DL (ref 0.2–1.2)
BUN BLD-MCNC: 16 MG/DL (ref 8–23)
BUN/CREAT SERPL: 18.8 (ref 7–25)
CALCIUM SPEC-SCNC: 9.3 MG/DL (ref 8.6–10.5)
CHLORIDE SERPL-SCNC: 101 MMOL/L (ref 98–107)
CO2 SERPL-SCNC: 29.6 MMOL/L (ref 22–29)
CREAT BLD-MCNC: 0.85 MG/DL (ref 0.57–1)
CRP SERPL-MCNC: 1.47 MG/DL (ref 0–0.5)
ERYTHROCYTE [SEDIMENTATION RATE] IN BLOOD: 31 MM/HR (ref 0–30)
GFR SERPL CREATININE-BSD FRML MDRD: 67 ML/MIN/1.73
GLOBULIN UR ELPH-MCNC: 3 GM/DL
GLUCOSE BLD-MCNC: 97 MG/DL (ref 65–99)
Lab: NORMAL
POTASSIUM BLD-SCNC: 4.1 MMOL/L (ref 3.5–5.2)
PROT SERPL-MCNC: 7.6 G/DL (ref 6–8.5)
SODIUM BLD-SCNC: 142 MMOL/L (ref 136–145)

## 2019-12-06 NOTE — PROGRESS NOTES
Notify the patient that the 2 inflammatory markers that we test for are very slightly elevated.  Her sed rate the normal is 0-30 and hers is 31.  The CRP normally is 0-0.5 and in her case it is 1.47 and about 11 months ago it was 1.41-these are both very minimally elevated and can be normal with your age and no secondary causes. The good news is the steroid should actually help. Would recommend having your PCP repeat these levels in 2-3 months to ensure they are not changing.  There is not been a significant change.  I also ran an autoimmune panel to look for autoimmune or rheumatological disorders and this was normal.  Her blood counts, liver and kidneys are normal. I hope your headache is feeling better. If not, please contact us for further imaging. Thanks, JAYNE Fuentes

## 2019-12-09 ENCOUNTER — TELEPHONE (OUTPATIENT)
Dept: NEUROLOGY | Facility: CLINIC | Age: 64
End: 2019-12-09

## 2019-12-09 NOTE — TELEPHONE ENCOUNTER
Pt called back and said to call her phone and lvm with all details and if she feels the need to call us back she would.  I left message with all information below

## 2019-12-09 NOTE — TELEPHONE ENCOUNTER
----- Message from JAYNE Ponce sent at 12/6/2019 11:39 AM EST -----  Notify the patient that the 2 inflammatory markers that we test for are very slightly elevated.  Her sed rate the normal is 0-30 and hers is 31.  The CRP normally is 0-0.5 and in her case it is 1.47 and about 11 months ago it was 1.41-these are both very minimally elevated and can be normal with your age and no secondary causes. The good news is the steroid should actually help. Would recommend having your PCP repeat these levels in 2-3 months to ensure they are not changing.  There is not been a significant change.  I also ran an autoimmune panel to look for autoimmune or rheumatological disorders and this was normal.  Her blood counts, liver and kidneys are normal. I hope your headache is feeling better. If not, please contact us for further imaging. Thanks, JAYNE Fuentes

## 2019-12-27 RX ORDER — VERAPAMIL HYDROCHLORIDE 240 MG/1
TABLET, FILM COATED, EXTENDED RELEASE ORAL
Qty: 90 TABLET | Refills: 1 | Status: SHIPPED | OUTPATIENT
Start: 2019-12-27 | End: 2020-06-24

## 2019-12-27 RX ORDER — ATORVASTATIN CALCIUM 40 MG/1
TABLET, FILM COATED ORAL
Qty: 90 TABLET | Refills: 1 | Status: SHIPPED | OUTPATIENT
Start: 2019-12-27 | End: 2020-06-24

## 2019-12-27 RX ORDER — CHLORTHALIDONE 25 MG/1
25 TABLET ORAL DAILY
Qty: 90 TABLET | Refills: 0 | Status: SHIPPED | OUTPATIENT
Start: 2019-12-27 | End: 2020-03-26

## 2020-02-24 RX ORDER — LOSARTAN POTASSIUM 100 MG/1
100 TABLET ORAL DAILY
Qty: 90 TABLET | Refills: 0 | Status: SHIPPED | OUTPATIENT
Start: 2020-02-24 | End: 2020-03-09 | Stop reason: SDUPTHER

## 2020-02-24 RX ORDER — LOSARTAN POTASSIUM 100 MG/1
100 TABLET ORAL DAILY
Qty: 90 TABLET | Refills: 3 | OUTPATIENT
Start: 2020-02-24

## 2020-02-25 NOTE — TELEPHONE ENCOUNTER
Patient called back and was told she needed a follow up appointment per Dr. Zaragoza.  Patient state she had to work and could not come in during the time frame in December.  30 day supply of Losartan sent to pharmacy and patient was transferred to make a 30 day appt follow up.   Patient verbalized understanding.

## 2020-02-25 NOTE — TELEPHONE ENCOUNTER
May give 30 days.  I had asked her to come back for follow up and labs at 6 months, which would have been December.

## 2020-02-25 NOTE — TELEPHONE ENCOUNTER
After checking the chart, Dr. Paco Bernal sent in a 90 day supply of Losartan yesterday.  Left voice mail for patient informing her of this.

## 2020-03-09 RX ORDER — LOSARTAN POTASSIUM 100 MG/1
100 TABLET ORAL DAILY
Qty: 90 TABLET | Refills: 0 | Status: SHIPPED | OUTPATIENT
Start: 2020-03-09 | End: 2020-08-21

## 2020-03-26 RX ORDER — CHLORTHALIDONE 25 MG/1
25 TABLET ORAL DAILY
Qty: 90 TABLET | Refills: 1 | Status: SHIPPED | OUTPATIENT
Start: 2020-03-26 | End: 2020-09-22

## 2020-06-23 ENCOUNTER — OFFICE VISIT (OUTPATIENT)
Dept: CARDIOLOGY | Facility: CLINIC | Age: 65
End: 2020-06-23

## 2020-06-23 VITALS
HEART RATE: 74 BPM | HEIGHT: 66 IN | OXYGEN SATURATION: 95 % | BODY MASS INDEX: 32.82 KG/M2 | DIASTOLIC BLOOD PRESSURE: 64 MMHG | SYSTOLIC BLOOD PRESSURE: 128 MMHG | WEIGHT: 204.2 LBS

## 2020-06-23 DIAGNOSIS — E78.2 MIXED HYPERLIPIDEMIA: ICD-10-CM

## 2020-06-23 DIAGNOSIS — I10 ESSENTIAL HYPERTENSION: Primary | ICD-10-CM

## 2020-06-23 PROCEDURE — 99213 OFFICE O/P EST LOW 20 MIN: CPT | Performed by: INTERNAL MEDICINE

## 2020-06-23 NOTE — PROGRESS NOTES
OFFICE FOLLOW UP     Date of Encounter:2020     Name: Agustina Ryees  : 1955  Address: [address] [place] [address]    PCP: Rich Zaragoza MD  [id number] [address]  [place] [address]    Agustina Reyes is a 64 y.o. female.      Chief Complaint: Follow up of CVA, Hypertension    Problem List:   1. Left occipital  CVA with a possible LPCA stenosis by MRA, 2014  a. MRI Brain  w/o contrast, 2014:  Showing scattered foci of acute ischemia on left occipital lobe.    b. MRA of neck with and without contrast:  Focal area of the P1 segment of left posterior cerebral artery.    c. Echocardiogram, 2014, LVEF 60-65%.  No  thrombus in left atrial appendage. No PFO. Prominent Chiari network noted in right atrium.    d. Carotid duplex, 2014:  Showing no significant carotid artery stenosis bilaterally.     e. Medtronic loop recorder implant, 2014 (battery life , 2018)  f. Transcranial  Doppler, 2014:  Showing normal flow and wave form seen in MELODIE, NCA, terminal ICA, PCA.  2. Hypertension.   3. Pre-Diabetes:  a. Hemoglobin A1c 2016:  6.2   4. Exertional calf burning, possible claudification  5. History of migraines.   6. Hyperlipidemia.   7. Former tobacco use  Allergies:  Allergies   Allergen Reactions   • Erythromycin        Current Medications:    Current Outpatient Medications:   •  aspirin 81 MG tablet, Take 81 mg by mouth Daily., Disp: , Rfl:   •  atorvastatin (LIPITOR) 40 MG tablet, TAKE 1 TABLET BY MOUTH DAILY, Disp: 90 tablet, Rfl: 1  •  butalbital-acetaminophen  MG tablet tablet, Take 1-2 tablets by mouth Every 6 (Six) Hours As Needed (migraines)., Disp: 15 each, Rfl: 0  •  chlorthalidone (HYGROTON) 25 MG tablet, TAKE 1 TABLET BY MOUTH DAILY, Disp: 90 tablet, Rfl: 1  •  clopidogrel (PLAVIX) 75 MG tablet, Take 1 tablet by mouth Daily., Disp: 30 tablet, Rfl: 11  •  fluticasone (FLONASE) 50 MCG/ACT nasal spray, 1 spray into  "the nostril(s) as directed by provider Daily., Disp: , Rfl:   •  losartan (COZAAR) 100 MG tablet, Take 1 tablet by mouth Daily., Disp: 90 tablet, Rfl: 0  •  prochlorperazine (COMPAZINE) 5 MG tablet, Take 1-2 tablets by mouth Every 6 (Six) Hours As Needed for Nausea or Vomiting (migraine)., Disp: 20 tablet, Rfl: 5  •  verapamil SR (CALAN-SR) 240 MG CR tablet, TAKE 1 TABLET BY MOUTH EVERY NIGHT, Disp: 90 tablet, Rfl: 1    History of Present Illness:         Mrs. Reyes returns for annual follow-up of her hypertension and history of CVA.  She is doing well.  Her blood pressure has been well controlled at home in the 120-130 range.  She denies chest pain, shortness of air, palpitations, or stroke like symptoms.  She does note some mild lower extremity edema after standing at the [employer] counter for 8 hours.  She is exercising on a regular basis.       The following portions of the patient's history were reviewed and updated as appropriate: allergies, current medications and problem list.    ROS: Pertinent positives as listed in the HPI.  All other systems reviewed and negative.    Objective:    Vitals:    06/23/20 1512 06/23/20 1516   BP: 126/64 128/64   BP Location: Left arm Left arm   Patient Position: Sitting Standing   Pulse: 72 74   SpO2: 95%    Weight: 92.6 kg (204 lb 3.2 oz)    Height: 167.6 cm (66\")        Physical Exam: alert and oriented X 3, skin warm and dry, lungs clear to auscultation, heart regular rate and rhythm, mild lower extremity edema.      Diagnostic Data:      Procedures      Assessment and Plan:   1.  Hypertension- controlled- continue Cozaar and Verapamil  2.  Dyslipidemia- continue Atorvastatin  3. S/P CVA- continue ASA and Plavix  4. Follow-up in 1 year, sooner if needed.    Scribed for Paco Bernal MD by Marialuisa Amezcua RN. 6/23/2020  15:51              EMR Dragon/Transcription Disclaimer:  Much of this encounter note is an electronic transcription/translation of spoken language to printed " text.  The electronic translation of spoken language may permit erroneous, or at times, nonsensical words or phrases to be inadvertently transcribed.  Although I have reviewed the note for such errors, some may still exist.

## 2020-06-24 RX ORDER — ATORVASTATIN CALCIUM 40 MG/1
TABLET, FILM COATED ORAL
Qty: 90 TABLET | Refills: 3 | Status: SHIPPED | OUTPATIENT
Start: 2020-06-24 | End: 2021-06-21 | Stop reason: SDUPTHER

## 2020-06-24 RX ORDER — VERAPAMIL HYDROCHLORIDE 240 MG/1
TABLET, FILM COATED, EXTENDED RELEASE ORAL
Qty: 90 TABLET | Refills: 3 | Status: SHIPPED | OUTPATIENT
Start: 2020-06-24 | End: 2021-06-21 | Stop reason: SDUPTHER

## 2020-08-17 RX ORDER — CLOPIDOGREL BISULFATE 75 MG/1
75 TABLET ORAL DAILY
Qty: 30 TABLET | Refills: 11 | Status: SHIPPED | OUTPATIENT
Start: 2020-08-17 | End: 2021-06-21 | Stop reason: SDUPTHER

## 2020-08-21 RX ORDER — LOSARTAN POTASSIUM 100 MG/1
TABLET ORAL
Qty: 90 TABLET | Refills: 2 | Status: SHIPPED | OUTPATIENT
Start: 2020-08-21 | End: 2021-05-19 | Stop reason: SDUPTHER

## 2020-09-22 RX ORDER — CHLORTHALIDONE 25 MG/1
TABLET ORAL
Qty: 90 TABLET | Refills: 1 | Status: SHIPPED | OUTPATIENT
Start: 2020-09-22 | End: 2021-03-22

## 2020-10-29 ENCOUNTER — TELEMEDICINE (OUTPATIENT)
Dept: INTERNAL MEDICINE | Facility: CLINIC | Age: 65
End: 2020-10-29

## 2020-10-29 DIAGNOSIS — J01.10 ACUTE NON-RECURRENT FRONTAL SINUSITIS: Primary | ICD-10-CM

## 2020-10-29 PROCEDURE — 99213 OFFICE O/P EST LOW 20 MIN: CPT | Performed by: INTERNAL MEDICINE

## 2020-10-29 RX ORDER — AMOXICILLIN AND CLAVULANATE POTASSIUM 875; 125 MG/1; MG/1
1 TABLET, FILM COATED ORAL 2 TIMES DAILY
Qty: 14 TABLET | Refills: 0 | Status: SHIPPED | OUTPATIENT
Start: 2020-10-29 | End: 2021-04-13

## 2020-10-29 RX ORDER — PREDNISONE 10 MG/1
TABLET ORAL
Qty: 18 TABLET | Refills: 0 | Status: SHIPPED | OUTPATIENT
Start: 2020-10-29 | End: 2021-04-13

## 2020-11-01 NOTE — PROGRESS NOTES
[facility]     Agustina Reyes  1955   [id number]      Patient Care Team:  Rich Zaragoza MD as PCP - General (Internal Medicine)  Rich Zaragoza MD as PCP - Internal Medicine (Internal Medicine)  China Ivey APRN as Referring Physician (Neurology)    Chief Complaint::   Chief Complaint   Patient presents with   • Earache      You have chosen to receive care through a telehealth visit.  Do you consent to use a video/audio connection for your medical care today? Yes      HPI  Ms Reyes was seen today via videovisit for several days of left ear pain, nasal congestion and drainage, and significantly, pain behind the left eye.  Her face feels hot but there is no fever.  She has been using saline nasal rinse.  She stopped using Flonase.  She has had no shortness of breath or chest pain or loss of sense of taste or smell.    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   • History of CVA (cerebrovascular accident)   • Prediabetes        Past Medical History:   Diagnosis Date   • Hyperlipidemia    • Hypertension    • Migraines    • Occipital neuralgia of left side    • Stroke (CMS/HCC)        Past Surgical History:   Procedure Laterality Date   • CATARACT EXTRACTION     • COLONOSCOPY     • HYSTERECTOMY  2000       Family History   Problem Relation Age of Onset   • Heart disease Mother    • Stroke Mother    • Hypertension Mother    • Heart disease Father    • Hypertension Father    • Stroke Maternal Grandfather    • No Known Problems Sister    • Breast cancer Neg Hx    • Ovarian cancer Neg Hx        Social History     Socioeconomic History   • Marital status: Single     Spouse name: Not on file   • Number of  children: Not on file   • Years of education: Not on file   • Highest education level: Not on file   Tobacco Use   • Smoking status: Former Smoker     Packs/day: 1.00     Types: Cigarettes     Quit date: 10/26/2014     Years since quittin.0   • Smokeless tobacco: Never Used   Substance and Sexual Activity   • Alcohol use: Yes     Comment: occas   • Drug use: No   • Sexual activity: Defer       Allergies   Allergen Reactions   • Erythromycin          Current Outpatient Medications:   •  amoxicillin-clavulanate (Augmentin) 875-125 MG per tablet, Take 1 tablet by mouth 2 (Two) Times a Day., Disp: 14 tablet, Rfl: 0  •  aspirin 81 MG tablet, Take 81 mg by mouth Daily., Disp: , Rfl:   •  atorvastatin (LIPITOR) 40 MG tablet, TAKE 1 TABLET BY MOUTH DAILY, Disp: 90 tablet, Rfl: 3  •  butalbital-acetaminophen  MG tablet tablet, Take 1-2 tablets by mouth Every 6 (Six) Hours As Needed (migraines)., Disp: 15 each, Rfl: 0  •  chlorthalidone (HYGROTON) 25 MG tablet, TAKE 1 TABLET BY MOUTH EVERY DAY, Disp: 90 tablet, Rfl: 1  •  clopidogrel (PLAVIX) 75 MG tablet, TAKE 1 TABLET BY MOUTH DAILY, Disp: 30 tablet, Rfl: 11  •  fluticasone (FLONASE) 50 MCG/ACT nasal spray, 1 spray into the nostril(s) as directed by provider Daily., Disp: , Rfl:   •  losartan (COZAAR) 100 MG tablet, TAKE 1 TABLET BY MOUTH EVERY DAY, Disp: 90 tablet, Rfl: 2  •  predniSONE (DELTASONE) 10 MG tablet, Take 3 tablets a day for 3 days, 2 for 3 days, then 1 for 3 days., Disp: 18 tablet, Rfl: 0  •  prochlorperazine (COMPAZINE) 5 MG tablet, Take 1-2 tablets by mouth Every 6 (Six) Hours As Needed for Nausea or Vomiting (migraine)., Disp: 20 tablet, Rfl: 5  •  verapamil SR (CALAN-SR) 240 MG CR tablet, TAKE 1 TABLET BY MOUTH EVERY NIGHT, Disp: 90 tablet, Rfl: 3    Review of Systems   Constitutional: Negative.    HENT: Positive for congestion, ear pain, postnasal drip and sinus pressure.    Respiratory: Negative.  Negative for chest tightness and shortness of  breath.    Cardiovascular: Negative.  Negative for chest pain.   Gastrointestinal: Negative for abdominal pain, blood in stool, constipation and diarrhea.        Vital Signs  There were no vitals filed for this visit.    Physical Exam  Constitutional:       General: She is not in acute distress.     Appearance: Normal appearance.   HENT:      Head: Normocephalic and atraumatic.   Eyes:      Extraocular Movements: Extraocular movements intact.      Pupils: Pupils are equal, round, and reactive to light.   Pulmonary:      Effort: Pulmonary effort is normal. No respiratory distress.   Neurological:      Mental Status: She is alert and oriented to person, place, and time.      Cranial Nerves: No cranial nerve deficit.   Psychiatric:         Mood and Affect: Mood normal.          Procedures    ACE III MINI             Assessment/Plan:    Diagnoses and all orders for this visit:    1. Acute non-recurrent frontal sinusitis (Primary)    Other orders  -     amoxicillin-clavulanate (Augmentin) 875-125 MG per tablet; Take 1 tablet by mouth 2 (Two) Times a Day.  Dispense: 14 tablet; Refill: 0  -     predniSONE (DELTASONE) 10 MG tablet; Take 3 tablets a day for 3 days, 2 for 3 days, then 1 for 3 days.  Dispense: 18 tablet; Refill: 0    Plan    Discomfort in the left periorbital region strongly suggest frontal sinusitis.  She is given Augmentin, tapering prednisone and instructed to resume Flonase.      Plan of care reviewed with patient at the conclusion of today's visit. Education was provided regarding diagnosis, management, and any prescribed or recommended OTC medications.Patient verbalizes understanding of and agreement with management plan.         Rich Zaragoza MD

## 2021-03-21 DIAGNOSIS — R73.03 PREDIABETES: ICD-10-CM

## 2021-03-21 DIAGNOSIS — E78.2 MIXED HYPERLIPIDEMIA: Primary | ICD-10-CM

## 2021-03-21 DIAGNOSIS — I10 ESSENTIAL HYPERTENSION: ICD-10-CM

## 2021-03-22 RX ORDER — CHLORTHALIDONE 25 MG/1
TABLET ORAL
Qty: 90 TABLET | Refills: 0 | Status: SHIPPED | OUTPATIENT
Start: 2021-03-22 | End: 2021-06-21

## 2021-03-22 NOTE — TELEPHONE ENCOUNTER
Next appt 6/29/21 - no labs since 2019 ([facility] provider) - will order and mail to pt.

## 2021-04-13 ENCOUNTER — OFFICE VISIT (OUTPATIENT)
Dept: INTERNAL MEDICINE | Facility: CLINIC | Age: 66
End: 2021-04-13

## 2021-04-13 VITALS
TEMPERATURE: 97.7 F | WEIGHT: 204 LBS | BODY MASS INDEX: 32.78 KG/M2 | HEIGHT: 66 IN | DIASTOLIC BLOOD PRESSURE: 60 MMHG | HEART RATE: 76 BPM | SYSTOLIC BLOOD PRESSURE: 132 MMHG

## 2021-04-13 DIAGNOSIS — Z87.891 FORMER SMOKER: ICD-10-CM

## 2021-04-13 DIAGNOSIS — I10 ESSENTIAL HYPERTENSION: ICD-10-CM

## 2021-04-13 DIAGNOSIS — R06.09 DYSPNEA ON EXERTION: Primary | ICD-10-CM

## 2021-04-13 PROCEDURE — 99214 OFFICE O/P EST MOD 30 MIN: CPT | Performed by: NURSE PRACTITIONER

## 2021-04-13 NOTE — PROGRESS NOTES
Agustina Reyes  1955  [id number]  Patient Care Team:  Rich Zaragoza MD as PCP - General (Internal Medicine)  Rich Zaragoza MD as PCP - Internal Medicine (Internal Medicine)  China Ivey APRN as Referring Physician (Neurology)  Paco Bernal MD as Consulting Physician (Cardiology)    Agustina Reyes is a pleasant 65 y.o. female who presents for evaluation of Shortness of Breath and Nerve pain (down right hip and leg )      Chief Complaint   Patient presents with   • Shortness of Breath   • Nerve pain     down right hip and leg        HPI:   Dyspnea with exertion since 2020, gradually worse.  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  No sx at rest.  Did not paly golf last year secondary to this sx. No chest pain.  minimal leg swelling, improves overnight.    Seen at [facility] urgent care in [place] 2020 had neb tx and steroids which improved sx for short period of time.        HX of CVA .  stopped smoking after smoking ~1ppd x 40yrs.   On plavix and asa, and statin.On chlorthalidone 25mg, verapamil 240mg and losartan 100mg.  Followed by cards    Past Medical History:   Diagnosis Date   • Hyperlipidemia    • Hypertension    • Migraines    • Occipital neuralgia of left side    • Stroke (CMS/HCC)      Past Surgical History:   Procedure Laterality Date   • CATARACT EXTRACTION     • COLONOSCOPY     • HYSTERECTOMY       Family History   Problem Relation Age of Onset   • Heart disease Mother    • Stroke Mother    • Hypertension Mother    • Heart disease Father    • Hypertension Father    • Stroke Maternal Grandfather    • No Known Problems Sister    • Breast cancer Neg Hx    • Ovarian cancer Neg Hx      Social History     Tobacco Use   Smoking Status Former Smoker   • Packs/day: 1.00   • Years: 40.00   • Pack years: 40.00   • Types: Cigarettes   • Quit date: 10/26/2014   • Years since quittin.4   Smokeless Tobacco Never Used  "    Allergies   Allergen Reactions   • Erythromycin GI Intolerance     Nausea         Current Outpatient Medications:   •  aspirin 81 MG tablet, Take 81 mg by mouth Daily., Disp: , Rfl:   •  atorvastatin (LIPITOR) 40 MG tablet, TAKE 1 TABLET BY MOUTH DAILY, Disp: 90 tablet, Rfl: 3  •  butalbital-acetaminophen  MG tablet tablet, Take 1-2 tablets by mouth Every 6 (Six) Hours As Needed (migraines)., Disp: 15 each, Rfl: 0  •  chlorthalidone (HYGROTON) 25 MG tablet, TAKE 1 TABLET BY MOUTH EVERY DAY, Disp: 90 tablet, Rfl: 0  •  clopidogrel (PLAVIX) 75 MG tablet, TAKE 1 TABLET BY MOUTH DAILY, Disp: 30 tablet, Rfl: 11  •  fluticasone (FLONASE) 50 MCG/ACT nasal spray, 1 spray into the nostril(s) as directed by provider Daily As Needed., Disp: , Rfl:   •  losartan (COZAAR) 100 MG tablet, TAKE 1 TABLET BY MOUTH EVERY DAY, Disp: 90 tablet, Rfl: 2  •  prochlorperazine (COMPAZINE) 5 MG tablet, Take 1-2 tablets by mouth Every 6 (Six) Hours As Needed for Nausea or Vomiting (migraine)., Disp: 20 tablet, Rfl: 5  •  verapamil SR (CALAN-SR) 240 MG CR tablet, TAKE 1 TABLET BY MOUTH EVERY NIGHT (Patient taking differently: Take 240 mg by mouth Daily.), Disp: 90 tablet, Rfl: 3    Review of Systems   Constitutional: Negative for chills, fatigue and fever.   HENT: Negative for congestion, ear pain and sinus pressure.    Respiratory: Positive for shortness of breath and wheezing. Negative for cough and chest tightness.    Cardiovascular: Negative for chest pain and palpitations.   Gastrointestinal: Negative for abdominal pain, blood in stool and constipation.   Neurological: Negative for dizziness, speech difficulty and headache.   Psychiatric/Behavioral: Negative for decreased concentration. The patient is not nervous/anxious.      /60 (BP Location: Left arm, Patient Position: Sitting, Cuff Size: Large Adult)   Pulse 76   Temp 97.7 °F (36.5 °C) (Temporal)   Ht 167.6 cm (65.98\")   Wt 92.5 kg (204 lb)   BMI 32.94 kg/m² "     Physical Exam  Constitutional:       Appearance: She is well-developed.   HENT:      Head: Normocephalic and atraumatic.      Comments: *wearing mask  Eyes:      Conjunctiva/sclera: Conjunctivae normal.      Pupils: Pupils are equal, round, and reactive to light.   Cardiovascular:      Rate and Rhythm: Normal rate and regular rhythm.      Pulses: Normal pulses.      Heart sounds: Normal heart sounds.   Pulmonary:      Effort: Pulmonary effort is normal.      Breath sounds: Normal breath sounds.   Musculoskeletal:         General: Normal range of motion.      Cervical back: Normal range of motion and neck supple.      Right lower leg: No edema.      Left lower leg: No edema.   Skin:     General: Skin is warm and dry.   Neurological:      Mental Status: She is alert and oriented to person, place, and time.   Psychiatric:         Mood and Affect: Mood normal.         Behavior: Behavior normal.         Thought Content: Thought content normal.         Judgment: Judgment normal.         Procedures    Results Review:  None    PHQ-9 Total Score: 0    Assessment/Plan:  Diagnoses and all orders for this visit:    1. Dyspnea on exertion (Primary)  Comments:  chest ct ordered, refer to pulmonolgy for PFT and eval  Orders:  -     Ambulatory Referral to Pulmonology  -     CT Chest With & Without Contrast Diagnostic; Future    2. Former smoker  -     CT Chest With & Without Contrast Diagnostic; Future    3. Essential hypertension  Comments:  continue current meds       There are no Patient Instructions on file for this visit.  Plan of care reviewed with patient at the conclusion of today's visit. Education was provided regarding diagnosis, management and any prescribed or recommended OTC medications.  Patient verbalizes understanding of and agreement with management plan.    Return for Next scheduled follow up.    *Note that portions of this note were completed with a voice recognition program.  Efforts were made to edit the  dictation but occasionally words are transcribed.    Jessa Barajas, APRN

## 2021-05-03 ENCOUNTER — HOSPITAL ENCOUNTER (OUTPATIENT)
Dept: CT IMAGING | Facility: HOSPITAL | Age: 66
Discharge: HOME | End: 2021-05-03
Admitting: NURSE PRACTITIONER

## 2021-05-03 DIAGNOSIS — Z87.891 FORMER SMOKER: ICD-10-CM

## 2021-05-03 DIAGNOSIS — R06.09 DYSPNEA ON EXERTION: ICD-10-CM

## 2021-05-03 LAB — CREAT BLDA-MCNC: 0.9 MG/DL (ref 0.6–1.3)

## 2021-05-03 PROCEDURE — 82565 ASSAY OF CREATININE: CPT

## 2021-05-03 PROCEDURE — 25010000002 IOPAMIDOL 61 % SOLUTION: Performed by: NURSE PRACTITIONER

## 2021-05-03 PROCEDURE — 71270 CT THORAX DX C-/C+: CPT

## 2021-05-03 RX ADMIN — IOPAMIDOL 75 ML: 612 INJECTION, SOLUTION INTRAVENOUS at 14:25

## 2021-05-05 ENCOUNTER — TELEPHONE (OUTPATIENT)
Dept: INTERNAL MEDICINE | Facility: CLINIC | Age: 66
End: 2021-05-05

## 2021-05-05 NOTE — TELEPHONE ENCOUNTER
----- Message from JAYNE Pollack sent at 5/5/2021  8:22 AM EDT -----  I do not see any evidence of a lung mass or cancer.  CT is not normal and we will let pulmonology evaluate further at her upcoming appt.

## 2021-05-06 NOTE — TELEPHONE ENCOUNTER
Patient returned call and verbalized understanding. Her pulmonology appt is 06/04. She would like to no what is not normal on her scan. Please advise.

## 2021-05-12 ENCOUNTER — LAB (OUTPATIENT)
Dept: LAB | Facility: HOSPITAL | Age: 66
End: 2021-05-12

## 2021-05-12 DIAGNOSIS — I10 ESSENTIAL HYPERTENSION: ICD-10-CM

## 2021-05-12 DIAGNOSIS — R73.03 PREDIABETES: ICD-10-CM

## 2021-05-12 DIAGNOSIS — E78.2 MIXED HYPERLIPIDEMIA: ICD-10-CM

## 2021-05-12 LAB
ALBUMIN SERPL-MCNC: 4.4 G/DL (ref 3.5–5.2)
ALBUMIN/GLOB SERPL: 1.5 G/DL
ALP SERPL-CCNC: 76 U/L (ref 39–117)
ALT SERPL W P-5'-P-CCNC: 14 U/L (ref 1–33)
ANION GAP SERPL CALCULATED.3IONS-SCNC: 12 MMOL/L (ref 5–15)
AST SERPL-CCNC: 14 U/L (ref 1–32)
BILIRUB SERPL-MCNC: 0.4 MG/DL (ref 0–1.2)
BUN SERPL-MCNC: 19 MG/DL (ref 8–23)
BUN/CREAT SERPL: 20.2 (ref 7–25)
CALCIUM SPEC-SCNC: 9.3 MG/DL (ref 8.6–10.5)
CHLORIDE SERPL-SCNC: 99 MMOL/L (ref 98–107)
CHOLEST SERPL-MCNC: 136 MG/DL (ref 0–200)
CO2 SERPL-SCNC: 26 MMOL/L (ref 22–29)
CREAT SERPL-MCNC: 0.94 MG/DL (ref 0.57–1)
GFR SERPL CREATININE-BSD FRML MDRD: 60 ML/MIN/1.73
GLOBULIN UR ELPH-MCNC: 3 GM/DL
GLUCOSE SERPL-MCNC: 105 MG/DL (ref 65–99)
HBA1C MFR BLD: 6.04 % (ref 4.8–5.6)
HDLC SERPL-MCNC: 48 MG/DL (ref 40–60)
LDLC SERPL CALC-MCNC: 72 MG/DL (ref 0–100)
LDLC/HDLC SERPL: 1.48 {RATIO}
POTASSIUM SERPL-SCNC: 3.9 MMOL/L (ref 3.5–5.2)
PROT SERPL-MCNC: 7.4 G/DL (ref 6–8.5)
SODIUM SERPL-SCNC: 137 MMOL/L (ref 136–145)
TRIGL SERPL-MCNC: 84 MG/DL (ref 0–150)
VLDLC SERPL-MCNC: 16 MG/DL (ref 5–40)

## 2021-05-12 PROCEDURE — 83036 HEMOGLOBIN GLYCOSYLATED A1C: CPT

## 2021-05-12 PROCEDURE — 36415 COLL VENOUS BLD VENIPUNCTURE: CPT

## 2021-05-12 PROCEDURE — 80053 COMPREHEN METABOLIC PANEL: CPT

## 2021-05-12 PROCEDURE — 80061 LIPID PANEL: CPT

## 2021-05-20 RX ORDER — LOSARTAN POTASSIUM 100 MG/1
100 TABLET ORAL DAILY
Qty: 90 TABLET | Refills: 3 | Status: SHIPPED | OUTPATIENT
Start: 2021-05-20 | End: 2022-05-13

## 2021-06-03 DIAGNOSIS — Z01.812 BLOOD TESTS PRIOR TO TREATMENT OR PROCEDURE: Primary | ICD-10-CM

## 2021-06-04 ENCOUNTER — CLINICAL SUPPORT NO REQUIREMENTS (OUTPATIENT)
Dept: PULMONOLOGY | Facility: CLINIC | Age: 66
End: 2021-06-04

## 2021-06-04 DIAGNOSIS — Z01.812 BLOOD TESTS PRIOR TO TREATMENT OR PROCEDURE: ICD-10-CM

## 2021-06-04 PROCEDURE — 99211 OFF/OP EST MAY X REQ PHY/QHP: CPT | Performed by: INTERNAL MEDICINE

## 2021-06-04 PROCEDURE — U0004 COV-19 TEST NON-CDC HGH THRU: HCPCS | Performed by: INTERNAL MEDICINE

## 2021-06-05 LAB — SARS-COV-2 RNA NOSE QL NAA+PROBE: NOT DETECTED

## 2021-06-07 ENCOUNTER — OFFICE VISIT (OUTPATIENT)
Dept: PULMONOLOGY | Facility: CLINIC | Age: 66
End: 2021-06-07

## 2021-06-07 VITALS
WEIGHT: 200 LBS | HEIGHT: 66 IN | OXYGEN SATURATION: 94 % | BODY MASS INDEX: 32.14 KG/M2 | TEMPERATURE: 97.5 F | HEART RATE: 74 BPM | SYSTOLIC BLOOD PRESSURE: 122 MMHG | DIASTOLIC BLOOD PRESSURE: 60 MMHG

## 2021-06-07 DIAGNOSIS — R06.02 SOB (SHORTNESS OF BREATH): Primary | ICD-10-CM

## 2021-06-07 DIAGNOSIS — J44.9 STAGE 2 MODERATE COPD BY GOLD CLASSIFICATION (HCC): ICD-10-CM

## 2021-06-07 DIAGNOSIS — K21.00 GASTROESOPHAGEAL REFLUX DISEASE WITH ESOPHAGITIS WITHOUT HEMORRHAGE: ICD-10-CM

## 2021-06-07 DIAGNOSIS — R93.89 ABNORMAL CT OF THE CHEST: ICD-10-CM

## 2021-06-07 DIAGNOSIS — J30.89 NON-SEASONAL ALLERGIC RHINITIS, UNSPECIFIED TRIGGER: ICD-10-CM

## 2021-06-07 PROCEDURE — 94729 DIFFUSING CAPACITY: CPT | Performed by: INTERNAL MEDICINE

## 2021-06-07 PROCEDURE — 94060 EVALUATION OF WHEEZING: CPT | Performed by: INTERNAL MEDICINE

## 2021-06-07 PROCEDURE — 94726 PLETHYSMOGRAPHY LUNG VOLUMES: CPT | Performed by: INTERNAL MEDICINE

## 2021-06-07 PROCEDURE — 99205 OFFICE O/P NEW HI 60 MIN: CPT | Performed by: INTERNAL MEDICINE

## 2021-06-07 RX ORDER — ALBUTEROL SULFATE 90 UG/1
4 AEROSOL, METERED RESPIRATORY (INHALATION) ONCE
Status: COMPLETED | OUTPATIENT
Start: 2021-06-07 | End: 2021-06-07

## 2021-06-07 RX ADMIN — ALBUTEROL SULFATE 4 PUFF: 90 AEROSOL, METERED RESPIRATORY (INHALATION) at 13:27

## 2021-06-08 DIAGNOSIS — R93.89 ABNORMAL CT OF THE CHEST: Primary | ICD-10-CM

## 2021-06-08 DIAGNOSIS — J44.9 STAGE 2 MODERATE COPD BY GOLD CLASSIFICATION (HCC): Primary | ICD-10-CM

## 2021-06-08 PROBLEM — J30.89 NON-SEASONAL ALLERGIC RHINITIS: Status: ACTIVE | Noted: 2021-06-08

## 2021-06-08 PROBLEM — K21.00 GASTROESOPHAGEAL REFLUX DISEASE WITH ESOPHAGITIS WITHOUT HEMORRHAGE: Status: ACTIVE | Noted: 2021-06-08

## 2021-06-08 NOTE — PROGRESS NOTES
"Initial Pulmonary Consult Note    Subjective   Reason for consultation/Chief Complaint: Shortness of Breath    Agustina Reyes is a 65 y.o. female is being seen for consultation today at the request of JAYNE Pollack    History of Present Illness    Ms. Reyes is a 66yo F who is referred for shortness of breath. She thinks this has been present for some time but has gradually gotten worse. She does not have any symptoms at rest. She notes that it feels as if she cannot \"breathe out\" when she is exerting herself. She has learned to breathe out slowly through her mouth. She does have some associated wheezing and a dry cough. She has significant nasal drainage congestion. She does have occasional acid reflux. She is a previous smoker.     Active Ambulatory Problems     Diagnosis Date Noted   • Occipital neuralgia of left side    • Hypertension    • Migraine with aura and without status migrainosus, not intractable    • Hyperlipidemia    • History of CVA (cerebrovascular accident) 10/30/2018   • Occlusion and stenosis of left posterior cerebral artery 12/17/2018   • History of CVA (cerebrovascular accident) 05/23/2019   • Prediabetes 06/10/2019   • Stage 2 moderate COPD by GOLD classification (CMS/[facility]) 06/08/2021   • Abnormal CT of the chest 06/08/2021   • Non-seasonal allergic rhinitis 06/08/2021   • Gastroesophageal reflux disease with esophagitis without hemorrhage 06/08/2021     Resolved Ambulatory Problems     Diagnosis Date Noted   • No Resolved Ambulatory Problems     Past Medical History:   Diagnosis Date   • Migraines    • Stroke (CMS/[facility])        Past Surgical History:   Procedure Laterality Date   • CATARACT EXTRACTION     • COLONOSCOPY     • HYSTERECTOMY  2000       Family History   Problem Relation Age of Onset   • Heart disease Mother    • Stroke Mother    • Hypertension Mother    • Heart disease Father    • Hypertension Father    • Stroke Maternal Grandfather    • No Known Problems Sister    • " Breast cancer Neg Hx    • Ovarian cancer Neg Hx        Social History     Socioeconomic History   • Marital status: Single     Spouse name: Not on file   • Number of children: Not on file   • Years of education: Not on file   • Highest education level: Not on file   Tobacco Use   • Smoking status: Former Smoker     Packs/day: 1.00     Years: 40.00     Pack years: 40.00     Types: Cigarettes     Quit date: 10/26/2014     Years since quittin.6   • Smokeless tobacco: Never Used   Substance and Sexual Activity   • Alcohol use: Yes     Comment: occas   • Drug use: No   • Sexual activity: Defer       Allergies   Allergen Reactions   • Erythromycin GI Intolerance     Nausea         Current Outpatient Medications   Medication Sig Dispense Refill   • aspirin 81 MG tablet Take 81 mg by mouth Daily.     • atorvastatin (LIPITOR) 40 MG tablet TAKE 1 TABLET BY MOUTH DAILY 90 tablet 3   • butalbital-acetaminophen  MG tablet tablet Take 1-2 tablets by mouth Every 6 (Six) Hours As Needed (migraines). 15 each 0   • chlorthalidone (HYGROTON) 25 MG tablet TAKE 1 TABLET BY MOUTH EVERY DAY 90 tablet 0   • clopidogrel (PLAVIX) 75 MG tablet TAKE 1 TABLET BY MOUTH DAILY 30 tablet 11   • fluticasone (FLONASE) 50 MCG/ACT nasal spray 1 spray into the nostril(s) as directed by provider Daily As Needed.     • losartan (COZAAR) 100 MG tablet Take 1 tablet by mouth Daily. 90 tablet 3   • prochlorperazine (COMPAZINE) 5 MG tablet Take 1-2 tablets by mouth Every 6 (Six) Hours As Needed for Nausea or Vomiting (migraine). 20 tablet 5   • verapamil SR (CALAN-SR) 240 MG CR tablet TAKE 1 TABLET BY MOUTH EVERY NIGHT (Patient taking differently: Take 240 mg by mouth Daily.) 90 tablet 3   • Fluticasone-Umeclidin-Vilant (TRELEGY) 100-62.5-25 MCG/INH inhaler Inhale 1 puff Daily. 1 each 6     No current facility-administered medications for this visit.       Review of Systems   Constitutional: Negative.    HENT: Positive for postnasal drip, rhinorrhea  "and sneezing.    Eyes: Positive for discharge and itching.   Respiratory: Positive for cough, shortness of breath and wheezing.    Cardiovascular: Negative.    Gastrointestinal: Negative.    Endocrine: Negative.    Genitourinary: Negative.    Musculoskeletal: Negative.    Skin: Negative.    Allergic/Immunologic: Negative.    Neurological: Negative.    Hematological: Negative.    Psychiatric/Behavioral: Negative.          Objective   Blood pressure 122/60, pulse 74, temperature 97.5 °F (36.4 °C), height 167.6 cm (65.98\"), weight 90.7 kg (200 lb), SpO2 94 %, not currently breastfeeding.  Physical Exam  Vitals and nursing note reviewed.   Constitutional:       General: She is not in acute distress.     Appearance: She is well-developed.   HENT:      Head: Normocephalic and atraumatic.   Eyes:      General: No scleral icterus.     Conjunctiva/sclera: Conjunctivae normal.      Pupils: Pupils are equal, round, and reactive to light.   Neck:      Thyroid: No thyromegaly.      Trachea: No tracheal deviation.   Cardiovascular:      Rate and Rhythm: Normal rate and regular rhythm.      Heart sounds: Normal heart sounds.   Pulmonary:      Effort: Pulmonary effort is normal. No respiratory distress.      Breath sounds: Normal breath sounds.   Abdominal:      General: Bowel sounds are normal.      Palpations: Abdomen is soft.      Tenderness: There is no abdominal tenderness.   Musculoskeletal:         General: Normal range of motion.      Cervical back: Normal range of motion and neck supple.   Lymphadenopathy:      Cervical: No cervical adenopathy.   Skin:     General: Skin is warm and dry.      Findings: No erythema or rash.   Neurological:      Mental Status: She is alert and oriented to person, place, and time.      Motor: No abnormal muscle tone.      Coordination: Coordination normal.   Psychiatric:         Speech: Speech normal.         Behavior: Behavior normal.         Judgment: Judgment normal. "         PFTs:  Performed in clinic and personally reviewed.   There is moderate airway obstruction.  There is a significant response to bronchodilators.   There is no restriction. There is air trapping.  The DLCO is normal.     Imaging:  CT chest from 5/3/21 reviewed. There are bilateral tree in bud opacifications within the mid and upper lungs left slightly greater than right with background scarring and atelectatic changes.       Assessment/Plan   Diagnoses and all orders for this visit:    1. SOB (shortness of breath) (Primary)  -     Pulmonary Function Test  -     albuterol sulfate HFA (PROVENTIL HFA;VENTOLIN HFA;PROAIR HFA) inhaler 4 puff    2. Stage 2 moderate COPD by GOLD classification (CMS/[facility])    3. Abnormal CT of the chest    4. Non-seasonal allergic rhinitis, unspecified trigger    5. Gastroesophageal reflux disease with esophagitis without hemorrhage    Other orders  -     Fluticasone-Umeclidin-Vilant (TRELEGY) 100-62.5-25 MCG/INH inhaler; Inhale 1 puff Daily.  Dispense: 1 each; Refill: 6        Discussion:  Ms. Reyes is a 64yo F who is referred for shortness of breath.     1. Moderate COPD  - We will start her on Trelegy 100. Samples given in clinic with instructions for use and Rx sent to pharmacy.   - Continue Albuterol as needed.   - Consider alpha-1 testing at next visit.     2. Abnormal CT Chest  - Most consistent with aspiration or post-nasal drainage.   - We will plan to repeat a CT scan of the chest in 6 months. If the nodules are still present, she may need a bronchoscopy to r/o DESMOND as she is not currently coughing anything up.     3. Allergic Rhinitis  - Continue Flonase and Benadryl as needed.     4. GERD  - Instructed to elevate the head of the bed.   - Do not eat within 2-3 hours of bedtime when possible.     She will follow up in 2-3 months to assess the response to therapy.          I have spent 61 minutes reviewing the patient record, face to face with the patient in discussion of her  test results and plans for further management and in documentation and coordination of care.       Aria V Case, DO  Pulmonary and Critical Care Medicine  Note Electronically Signed

## 2021-06-08 NOTE — TELEPHONE ENCOUNTER
Patient called stating trelegy was cost prohibitive. She has samples from appt yesterday. Per Dr. Irwin, can try Breztri instead. Pt needs to call their insurance to see if it's covered. Left message for patient to call back.

## 2021-06-19 DIAGNOSIS — E78.2 MIXED HYPERLIPIDEMIA: ICD-10-CM

## 2021-06-19 DIAGNOSIS — I10 ESSENTIAL HYPERTENSION: ICD-10-CM

## 2021-06-21 RX ORDER — ATORVASTATIN CALCIUM 40 MG/1
40 TABLET, FILM COATED ORAL DAILY
Qty: 90 TABLET | Refills: 3 | Status: SHIPPED | OUTPATIENT
Start: 2021-06-21 | End: 2022-03-21

## 2021-06-21 RX ORDER — CLOPIDOGREL BISULFATE 75 MG/1
75 TABLET ORAL DAILY
Qty: 90 TABLET | Refills: 0 | Status: SHIPPED | OUTPATIENT
Start: 2021-06-21 | End: 2021-11-03

## 2021-06-21 RX ORDER — VERAPAMIL HYDROCHLORIDE 240 MG/1
240 TABLET, FILM COATED, EXTENDED RELEASE ORAL NIGHTLY
Qty: 90 TABLET | Refills: 3 | Status: SHIPPED | OUTPATIENT
Start: 2021-06-21 | End: 2022-03-21

## 2021-06-21 RX ORDER — CHLORTHALIDONE 25 MG/1
TABLET ORAL
Qty: 90 TABLET | Refills: 3 | Status: SHIPPED | OUTPATIENT
Start: 2021-06-21 | End: 2022-03-21

## 2021-06-21 NOTE — TELEPHONE ENCOUNTER
Lab Results   Component Value Date    GLUCOSE 105 (H) 05/12/2021    BUN 19 05/12/2021    CREATININE 0.94 05/12/2021    EGFRIFNONA 60 (L) 05/12/2021    BCR 20.2 05/12/2021    K 3.9 05/12/2021    CO2 26.0 05/12/2021    CALCIUM 9.3 05/12/2021    ALBUMIN 4.40 05/12/2021    AST 14 05/12/2021    ALT 14 05/12/2021     Lab Results   Component Value Date    CHOL 136 05/12/2021    CHLPL 115 02/26/2015    TRIG 84 05/12/2021    HDL 48 05/12/2021    LDL 72 05/12/2021       Next appt 7/8/2021

## 2021-07-07 NOTE — PROGRESS NOTES
OFFICE FOLLOW UP     Date of Encounter:2021     Name: Agustina Reyes  : 1955  Address: [address] [place] [address]    PCP: Rich Zaragoza MD  [facility]  [place] [address]    Agustina Reyes is a 65 y.o. female.      Chief Complaint: Follow up of CVA, Hypertension    Problem List:   1. Left occipital  CVA with a possible LPCA stenosis by MRA, 2014  a. MRI Brain  w/o contrast, 2014:  Showing scattered foci of acute ischemia on left occipital lobe.    b. MRA of neck with and without contrast:  Focal area of the P1 segment of left posterior cerebral artery.    c. Echocardiogram, 2014, LVEF 60-65%.  No  thrombus in left atrial appendage. No PFO. Prominent Chiari network noted in right atrium.    d. Carotid duplex, 2014:  Showing no significant carotid artery stenosis bilaterally.     e. Medtronic loop recorder implant, 2014 (battery life , 2018)  f. Transcranial  Doppler, 2014:  Showing normal flow and wave form seen in MELODIE, NCA, terminal ICA, PCA.  2. Hypertension.   3. Pre-Diabetes:  a. Hemoglobin A1c 2016:  6.2   4. Exertional calf burning, possible claudification  5. Cluster migranes   6. Hyperlipidemia.   7. Former tobacco use    Allergies:  Allergies   Allergen Reactions   • Erythromycin GI Intolerance     Nausea         Current Medications:  Current Outpatient Medications   Medication Instructions   • amLODIPine (NORVASC) 5 mg, Oral, Daily   • aspirin 81 mg, Oral, Daily   • atorvastatin (LIPITOR) 40 mg, Oral, Daily   • Budeson-Glycopyrrol-Formoterol (Breztri Aerosphere) 160-9-4.8 MCG/ACT aerosol inhaler 2 puffs, Inhalation, 2 Times Daily   • butalbital-acetaminophen  MG tablet tablet 1-2 tablets, Oral, Every 6 Hours PRN   • chlorthalidone (HYGROTON) 25 MG tablet TAKE 1 TABLET BY MOUTH EVERY DAY   • clopidogrel (PLAVIX) 75 mg, Oral, Daily   • fluticasone (FLONASE) 50 MCG/ACT nasal spray 1  "spray, Nasal, Daily PRN   • losartan (COZAAR) 100 mg, Oral, Daily   • prochlorperazine (COMPAZINE) 5-10 mg, Oral, Every 6 Hours PRN   • verapamil SR (CALAN-SR) 240 mg, Oral, Nightly        History of Present Illness:   Agustina returns for follow-up today.  She has returned to work full-time at [employer].  She has had no further neurologic symptoms.  She states that she has not seen iRch Zaragoza recently because \"I have not had any problems\".  Her blood pressures are elevated today and consistent with blood pressures that she takes at home (in the range of 135-145 systolic).  Medications are as noted above.  She has had no symptoms suggesting angina or congestive heart failure.  During this visit I did review lab studies from 5/12/2021 and these include an LDL of 72, a GFR of 60, and a hemoglobin A1c of 6. We discussed this.      The following portions of the patient's history were reviewed and updated as appropriate: allergies, current medications and problem list.    ROS: Pertinent positives as listed in the HPI.  All other systems reviewed and negative.    Objective:    Vitals:    07/08/21 1549 07/08/21 1550   BP: 145/50 136/68   BP Location: Left arm Left arm   Patient Position: Sitting Standing   Pulse: 71 81   SpO2: 95%    Weight: 90.8 kg (200 lb 3.2 oz)    Height: 170.2 cm (67\")      Body mass index is 31.36 kg/m².    Physical Exam: GENERAL: Alert, cooperative, in no acute distress. Overweight.    HEENT: Normocephalic, no adenopathy, no jugular venous distention  HEART: No discrete PMI is noted. Regular rhythm, normal rate, and no murmurs, gallops, or rubs.   LUNGS: Clear to auscultation bilaterally. No wheezing, rales or ronchi.  ABDOMEN: Soft, bowel sounds present, non-tender   NEUROLOGIC: No focal abnormalities involving strength or sensation are noted.   EXTREMITIES: No clubbing, cyanosis, or edema noted.         Assessment and Plan:   1. S/p CVA: No recurrence.  We will continue dual antiplatelet " therapy, blood pressure control, and a statin.  2. HTN: Blood pressures in the office today parallel those that she reports from home and are inconsistent with current AHA guidelines.  I will add amlodipine 5 mg p.o. daily to her regimen.  The patient asked whether or not she can discontinue her valsartan that had been previously started for the treatment of cluster headaches.  I advised against this and asked her to take home blood pressure readings 3 times weekly and to call us should blood pressures (systolic) remain above 130 mmHg. I do not think that Valsartan is adding significantly to her her AHT regimen and told her that she may try discontinuation after amlodipine initiation while monitoring BPs and headaches.  3. HLD: Her LDL is at target on the current treatment.  Would not make changes at this time.          EMR Dragon/Transcription Disclaimer:  Much of this encounter note is an electronic transcription/translation of spoken language to printed text.  The electronic translation of spoken language may permit erroneous, or at times, nonsensical words or phrases to be inadvertently transcribed.  Although I have reviewed the note for such errors, some may still exist.

## 2021-07-08 ENCOUNTER — OFFICE VISIT (OUTPATIENT)
Dept: CARDIOLOGY | Facility: CLINIC | Age: 66
End: 2021-07-08

## 2021-07-08 VITALS
HEIGHT: 67 IN | WEIGHT: 200.2 LBS | DIASTOLIC BLOOD PRESSURE: 68 MMHG | SYSTOLIC BLOOD PRESSURE: 136 MMHG | BODY MASS INDEX: 31.42 KG/M2 | HEART RATE: 81 BPM | OXYGEN SATURATION: 95 %

## 2021-07-08 DIAGNOSIS — I10 ESSENTIAL HYPERTENSION: Primary | ICD-10-CM

## 2021-07-08 PROCEDURE — 99214 OFFICE O/P EST MOD 30 MIN: CPT | Performed by: INTERNAL MEDICINE

## 2021-07-08 RX ORDER — AMLODIPINE BESYLATE 5 MG/1
5 TABLET ORAL DAILY
Qty: 30 TABLET | Refills: 11 | Status: SHIPPED | OUTPATIENT
Start: 2021-07-08 | End: 2021-08-10 | Stop reason: DRUGHIGH

## 2021-08-09 ENCOUNTER — TELEPHONE (OUTPATIENT)
Dept: CARDIOLOGY | Facility: CLINIC | Age: 66
End: 2021-08-09

## 2021-08-09 NOTE — TELEPHONE ENCOUNTER
Pt calling w BP readings since LOV. States she has been taking her verapamil 240 mg, Losartan 100 mg, and amlodipine 5 mg (newly added at LOV) as prescribed. Her readings are below. I advised pt to continue current medications and will inform her of any further changes. Please advise with any further recommendations.     143/68 --- 139/59  138/89 --- 142/62  136/71 ---141/60  141/71 --- 131/61  141/61 --- 116/52  144/72---- 140/66  137/64 --- 144/64  133/66 --- 134/65

## 2021-08-10 RX ORDER — AMLODIPINE BESYLATE 10 MG/1
10 TABLET ORAL DAILY
COMMUNITY
End: 2021-08-31 | Stop reason: SDUPTHER

## 2021-08-10 NOTE — TELEPHONE ENCOUNTER
LVM for patient explaining these recommendations. Left call back number for any further questions or concerns. Rx dose change updated on pt med list.

## 2021-08-11 NOTE — TELEPHONE ENCOUNTER
Pt called to report she had not heard back from message and BP readings on Monday. I left another VM (per her request) with detailed instructions to increase amlodipine to 10 mg daily and update in 1-2 weeks.     8/16/2021 BLAKE - ZHANG aware she is on amlodipine and Verapamil. Update BP readings PRN.   8/31/2021  Update on BP readings  120/65  137/62  137/65  122/68  135/68  144/66  126/60  135/68  125/53  141/63  137/62  139/63  Taking amlodipine 10 mg daily. Will send in new Rx at her request.

## 2021-08-31 DIAGNOSIS — J44.9 STAGE 2 MODERATE COPD BY GOLD CLASSIFICATION (HCC): ICD-10-CM

## 2021-08-31 RX ORDER — AMLODIPINE BESYLATE 10 MG/1
10 TABLET ORAL DAILY
Qty: 90 TABLET | Refills: 3 | Status: SHIPPED | OUTPATIENT
Start: 2021-08-31 | End: 2022-05-03

## 2021-08-31 RX ORDER — BUDESONIDE, GLYCOPYRROLATE, AND FORMOTEROL FUMARATE 160; 9; 4.8 UG/1; UG/1; UG/1
2 AEROSOL, METERED RESPIRATORY (INHALATION) 2 TIMES DAILY
Qty: 10.7 G | Refills: 3 | Status: SHIPPED | OUTPATIENT
Start: 2021-08-31 | End: 2021-09-20 | Stop reason: SDUPTHER

## 2021-09-20 ENCOUNTER — OFFICE VISIT (OUTPATIENT)
Dept: PULMONOLOGY | Facility: CLINIC | Age: 66
End: 2021-09-20

## 2021-09-20 VITALS
SYSTOLIC BLOOD PRESSURE: 158 MMHG | OXYGEN SATURATION: 96 % | RESPIRATION RATE: 18 BRPM | DIASTOLIC BLOOD PRESSURE: 68 MMHG | BODY MASS INDEX: 32.04 KG/M2 | WEIGHT: 204.13 LBS | HEART RATE: 75 BPM | TEMPERATURE: 98 F | HEIGHT: 67 IN

## 2021-09-20 DIAGNOSIS — J44.9 STAGE 2 MODERATE COPD BY GOLD CLASSIFICATION (HCC): Primary | ICD-10-CM

## 2021-09-20 DIAGNOSIS — R93.89 ABNORMAL CT OF THE CHEST: ICD-10-CM

## 2021-09-20 DIAGNOSIS — J30.89 NON-SEASONAL ALLERGIC RHINITIS, UNSPECIFIED TRIGGER: ICD-10-CM

## 2021-09-20 PROCEDURE — 99213 OFFICE O/P EST LOW 20 MIN: CPT | Performed by: NURSE PRACTITIONER

## 2021-09-20 RX ORDER — BUDESONIDE, GLYCOPYRROLATE, AND FORMOTEROL FUMARATE 160; 9; 4.8 UG/1; UG/1; UG/1
2 AEROSOL, METERED RESPIRATORY (INHALATION) 2 TIMES DAILY
Qty: 3 EACH | Refills: 3 | Status: SHIPPED | OUTPATIENT
Start: 2021-09-20 | End: 2021-12-03 | Stop reason: SDUPTHER

## 2021-09-20 NOTE — PROGRESS NOTES
"[facility] Pulmonary Follow up      Chief Complaint  Shortness of Breath (f/u)    Subjective          Agustina Reyes presents to [facility] for a follow-up after her initial visit with Dr. Irwin.  She initially saw Dr. Irwin in June of this year for some shortness of breath.  She does have moderate COPD on her PFTs and was started on Trelegy, her insurance pays for Breztri.  She does have a history of a 40 pack/year smoking history but quit 7 years ago.    Overall her shortness of breath is better on the Breztri.  She does have some dyspnea when walking long distances.  She does not have any cough or sputum production.  She is having a bit of postnasal drainage from seasonal allergies.    She also had an abnormal CT scan of the chest that showed some bilateral tree-in-bud opacifications in the mid and upper lung cui.  Dr. Hernández plans is for a follow-up CT in 6 months.  She did put her on aggressive reflux precautions in the event that the abnormal CT related to chronic aspiration.  She is adhering to aspiration precautions including elevating the head of her bed and not eating prior to bed anymore.      Objective     Vital Signs:   /68 (BP Location: Left arm, Patient Position: Sitting, Cuff Size: Adult)   Pulse 75   Temp 98 °F (36.7 °C)   Resp 18   Ht 170.2 cm (67\")   Wt 92.6 kg (204 lb 2 oz)   SpO2 96% Comment: room air at rest  BMI 31.97 kg/m²       Immunization History   Administered Date(s) Administered   • COVID-19 (PFIZER) 03/09/2021, 03/27/2021   • Flu Vaccine Quad PF >18YRS 03/10/2019   • Influenza Quad Vaccine (Inpatient) 03/10/2019, 08/28/2020   • Shingrix 12/15/2019, 02/09/2020       Physical Exam  Vitals reviewed.   Constitutional:       Appearance: She is well-developed.   HENT:      Head: Normocephalic and atraumatic.   Eyes:      Pupils: Pupils are equal, round, and reactive to light.   Cardiovascular:      Rate and Rhythm: Normal " rate and regular rhythm.      Heart sounds: No murmur heard.     Pulmonary:      Effort: Pulmonary effort is normal. No respiratory distress.      Breath sounds: Normal breath sounds. No wheezing or rales.   Abdominal:      General: Bowel sounds are normal. There is no distension.      Palpations: Abdomen is soft.   Musculoskeletal:         General: Normal range of motion.      Cervical back: Normal range of motion and neck supple.   Skin:     General: Skin is warm and dry.      Findings: No erythema.   Neurological:      Mental Status: She is alert and oriented to person, place, and time.   Psychiatric:         Behavior: Behavior normal.          Result Review :                         Assessment and Plan    Problem List Items Addressed This Visit        Allergies and Adverse Reactions    Non-seasonal allergic rhinitis       Pulmonary and Pneumonias    Stage 2 moderate COPD by GOLD classification (CMS/[facility]) - Primary    Relevant Medications    Budeson-Glycopyrrol-Formoterol (Breztri Aerosphere) 160-9-4.8 MCG/ACT aerosol inhaler       Symptoms and Signs    Abnormal CT of the chest        At this time she is doing much better symptomatically from her shortness of breath on the Breztri.  I will go ahead and send in an order for 3-month supply for better insurance coverage.    I did encourage her to continue with her activity to help with her overall activity tolerance.    We did discuss she will need her follow-up CT scan in November, and to continue on nocturnal aspiration precautions.    Follow-up with Dr. Irwin after her CT scan of the chest.      Follow Up     No follow-ups on file.  Patient was given instructions and counseling regarding her condition or for health maintenance advice. Please see specific information pulled into the AVS if appropriate.     I spent 25 minutes caring for Agustina on this date of service. This time includes time spent by me in the following activities:preparing for the visit, reviewing  tests, obtaining and/or reviewing a separately obtained history, performing a medically appropriate examination and/or evaluation , counseling and educating the patient/family/caregiver, ordering medications, tests, or procedures, referring and communicating with other health care professionals  and documenting information in the medical record      JAYNE Mendez, ACNP  [facility] Pulmonary Critical Care Medicine  Electronically signed

## 2021-11-03 RX ORDER — CLOPIDOGREL BISULFATE 75 MG/1
75 TABLET ORAL DAILY
Qty: 90 TABLET | Refills: 2 | Status: SHIPPED | OUTPATIENT
Start: 2021-11-03 | End: 2022-08-11 | Stop reason: SDUPTHER

## 2021-11-12 ENCOUNTER — HOSPITAL ENCOUNTER (OUTPATIENT)
Dept: CT IMAGING | Facility: HOSPITAL | Age: 66
Discharge: HOME | End: 2021-11-12
Admitting: NURSE PRACTITIONER

## 2021-11-12 DIAGNOSIS — R93.89 ABNORMAL CT OF THE CHEST: ICD-10-CM

## 2021-11-12 PROCEDURE — 71250 CT THORAX DX C-: CPT

## 2021-12-03 ENCOUNTER — PRIOR AUTHORIZATION (OUTPATIENT)
Dept: PULMONOLOGY | Facility: CLINIC | Age: 66
End: 2021-12-03

## 2021-12-03 DIAGNOSIS — J44.9 STAGE 2 MODERATE COPD BY GOLD CLASSIFICATION (HCC): ICD-10-CM

## 2021-12-03 RX ORDER — BUDESONIDE, GLYCOPYRROLATE, AND FORMOTEROL FUMARATE 160; 9; 4.8 UG/1; UG/1; UG/1
2 AEROSOL, METERED RESPIRATORY (INHALATION) 2 TIMES DAILY
Qty: 3 EACH | Refills: 3 | Status: SHIPPED | OUTPATIENT
Start: 2021-12-03 | End: 2023-01-18 | Stop reason: SDUPTHER

## 2021-12-21 ENCOUNTER — OFFICE VISIT (OUTPATIENT)
Dept: PULMONOLOGY | Facility: CLINIC | Age: 66
End: 2021-12-21

## 2021-12-21 VITALS
RESPIRATION RATE: 18 BRPM | DIASTOLIC BLOOD PRESSURE: 66 MMHG | OXYGEN SATURATION: 95 % | TEMPERATURE: 96.4 F | HEIGHT: 67 IN | WEIGHT: 209.13 LBS | BODY MASS INDEX: 32.82 KG/M2 | SYSTOLIC BLOOD PRESSURE: 136 MMHG | HEART RATE: 79 BPM

## 2021-12-21 DIAGNOSIS — K21.00 GASTROESOPHAGEAL REFLUX DISEASE WITH ESOPHAGITIS WITHOUT HEMORRHAGE: ICD-10-CM

## 2021-12-21 DIAGNOSIS — J44.9 STAGE 2 MODERATE COPD BY GOLD CLASSIFICATION (HCC): Primary | ICD-10-CM

## 2021-12-21 DIAGNOSIS — J30.89 NON-SEASONAL ALLERGIC RHINITIS, UNSPECIFIED TRIGGER: ICD-10-CM

## 2021-12-21 DIAGNOSIS — R93.89 ABNORMAL CT OF THE CHEST: ICD-10-CM

## 2021-12-21 PROCEDURE — 99214 OFFICE O/P EST MOD 30 MIN: CPT | Performed by: INTERNAL MEDICINE

## 2021-12-21 NOTE — PROGRESS NOTES
"Pulmonary Office Follow Up      Subjective   Chief Complaint: Shortness of Breath    Agustina Reyes is a 66 y.o. female is being seen in follow up for COPD    History of Present Illness    Ms. Reyes is a 65yo F who is followed for COPD and an abnormal CT scan of the chest. She was last seen in clinic on 9/20/21 by JAYNE Mendez.     She returns to clinic today for follow up. Since she was last seen, she has been using Breztri twice daily. She has not had any exacerbations. She was recently diagnosed with conjunctivitis. She does feel like she gets junk that she needs to cough up.     The following portions of the patient's history were reviewed and updated as appropriate: allergies, current medications, past family history, past medical history, past social history, past surgical history and problem list.    Review of Systems   Constitutional: Negative.    HENT: Negative.    Eyes: Positive for discharge.   Respiratory: Positive for cough and shortness of breath.    Cardiovascular: Negative.    Gastrointestinal: Negative.    Endocrine: Negative.    Genitourinary: Negative.    Musculoskeletal: Negative.    Skin: Negative.    Allergic/Immunologic: Negative.    Neurological: Negative.    Hematological: Negative.    Psychiatric/Behavioral: Negative.           Objective   Blood pressure 136/66, pulse 79, temperature 96.4 °F (35.8 °C), resp. rate 18, height 170.2 cm (67\"), weight 94.9 kg (209 lb 2 oz), SpO2 95 %, not currently breastfeeding.  Physical Exam  Vitals and nursing note reviewed.   Constitutional:       General: She is not in acute distress.     Appearance: She is well-developed.   HENT:      Head: Normocephalic and atraumatic.   Eyes:      General: No scleral icterus.     Conjunctiva/sclera: Conjunctivae normal.      Pupils: Pupils are equal, round, and reactive to light.   Neck:      Thyroid: No thyromegaly.      Trachea: No tracheal deviation.   Cardiovascular:      Rate and Rhythm: Normal rate and " regular rhythm.      Heart sounds: Normal heart sounds.   Pulmonary:      Effort: Pulmonary effort is normal. No respiratory distress.      Breath sounds: Normal breath sounds.   Abdominal:      General: Bowel sounds are normal.      Palpations: Abdomen is soft.      Tenderness: There is no abdominal tenderness.   Musculoskeletal:         General: Normal range of motion.      Cervical back: Normal range of motion and neck supple.   Lymphadenopathy:      Cervical: No cervical adenopathy.   Skin:     General: Skin is warm and dry.      Findings: No erythema or rash.   Neurological:      Mental Status: She is alert and oriented to person, place, and time.      Motor: No abnormal muscle tone.      Coordination: Coordination normal.   Psychiatric:         Speech: Speech normal.         Behavior: Behavior normal.         Judgment: Judgment normal.         PFTs:  No new PFTs.     Imaging:  CT Chest from 11/12/21 reviewed. There has been mild interval improvement in previously noted diffuse upper lung predominant areas of tree in bud nodularity. There are no enlarging suspicious pulmonary nodules.     Assessment/Plan   Diagnoses and all orders for this visit:    1. Stage 2 moderate COPD by GOLD classification (HCC) (Primary)    2. Abnormal CT of the chest    3. Non-seasonal allergic rhinitis, unspecified trigger    4. Gastroesophageal reflux disease with esophagitis without hemorrhage        Discussion:  Ms. Reyes is a 67yo F who is followed for COPD and Pulmonary Nodules     1. Moderate COPD  - Continue Breztri.   - Continue Albuterol as needed.   - Alpha-1 kit today.      2. Abnormal CT Chest  - Most consistent with aspiration or post-nasal drainage.   - We will plan to repeat a CT scan of the chest in 6 months (This will be in May).   - I have asked her to let us know if she develops any systemic symptoms of MAC infection and we discussed these in clinic as we would need to perform a bronchoscopy at that time for  evaluation.      3. Allergic Rhinitis  - Continue Flonase and Benadryl as needed.   - Add OTC antihistamine such as Zyrtec.   - Samples of Mucinex given.      4. GERD  - Elevate the head of the bed.   - Do not eat within 2-3 hours of bedtime when possible.        Follow up after repeat CT chest in May.       Aria MARRERO Case, DO  Pulmonary and Critical Care Medicine  Note Electronically Signed

## 2021-12-22 ENCOUNTER — TELEPHONE (OUTPATIENT)
Dept: INTERNAL MEDICINE | Facility: CLINIC | Age: 66
End: 2021-12-22

## 2021-12-22 DIAGNOSIS — R93.89 ABNORMAL CT OF THE CHEST: Primary | ICD-10-CM

## 2021-12-22 RX ORDER — NITROFURANTOIN 25; 75 MG/1; MG/1
100 CAPSULE ORAL 2 TIMES DAILY
Qty: 16 CAPSULE | Refills: 0 | Status: SHIPPED | OUTPATIENT
Start: 2021-12-22 | End: 2022-01-14

## 2021-12-22 NOTE — TELEPHONE ENCOUNTER
Caller: Agustina Reyes     Relationship: Self    Best call back number: [phone]    What medication are you requesting: NA  What are your current symptoms: BURNING, URINARY PAIN    How long have you been experiencing symptoms: 2 DAYS    Have you had these symptoms before:    [x] Yes  [] No    Have you been treated for these symptoms before:   [x] Yes  [] No    If a prescription is needed, what is your preferred pharmacy and phone number: [id number] [place] [address] - [phone]  - [phone] FX     Additional notes: PLEASE GIVE PATIENT A CALL TO LET HER KNOW WHAT CAN BE DONE PLEASE

## 2021-12-22 NOTE — TELEPHONE ENCOUNTER
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  She wants to know if something could be called in for her.  Call her back at [phone]

## 2021-12-30 ENCOUNTER — OFFICE VISIT (OUTPATIENT)
Dept: INTERNAL MEDICINE | Facility: CLINIC | Age: 66
End: 2021-12-30

## 2021-12-30 VITALS
WEIGHT: 209 LBS | TEMPERATURE: 97.5 F | DIASTOLIC BLOOD PRESSURE: 58 MMHG | SYSTOLIC BLOOD PRESSURE: 134 MMHG | HEIGHT: 67 IN | HEART RATE: 84 BPM | BODY MASS INDEX: 32.8 KG/M2

## 2021-12-30 DIAGNOSIS — I10 PRIMARY HYPERTENSION: ICD-10-CM

## 2021-12-30 DIAGNOSIS — B02.9 HERPES ZOSTER WITHOUT COMPLICATION: Primary | ICD-10-CM

## 2021-12-30 PROCEDURE — 99213 OFFICE O/P EST LOW 20 MIN: CPT | Performed by: NURSE PRACTITIONER

## 2021-12-30 RX ORDER — VALACYCLOVIR HYDROCHLORIDE 1 G/1
1000 TABLET, FILM COATED ORAL 2 TIMES DAILY
Qty: 14 TABLET | Refills: 0 | Status: SHIPPED | OUTPATIENT
Start: 2021-12-30 | End: 2022-01-06

## 2021-12-30 NOTE — PROGRESS NOTES
Follow Up Office Visit      Patient Name: Agustina Reyes  : 1955   MRN: [id number]   Care Team: Patient Care Team:  Rich Zaragoza MD as PCP - General (Internal Medicine)  Rich Zaragoza MD as PCP - Internal Medicine (Internal Medicine)  China Ivey APRN as Referring Physician (Neurology)  Paco Bernal MD as Consulting Physician (Cardiology)    Chief Complaint:    Chief Complaint   Patient presents with   • Rash       History of Present Illness: Agustina Reyes is a 66 y.o. female with pertinent medical history significant for hypertension, hyperlipidemia, prediabetes, history of CVA, COPD, migraine headache and GERD.  She presents today for new acute issue of left shoulder and left upper extremity pain with onset of rash.    Reports that approximately 3 weeks ago she noticed pain in her left shoulder.  This progressed to some throbbing sensation radiating down into her arm.  Over the last week or so, she noticed a small rash on the posterior side of her shoulder, now has spread to the anterior side.  States she thought she may have been bitten by a spider.    Noted recent pertinent medical history of viral conjunctivitis and UTI, both treated by [facility].      She has been applying OTC hydrocortisone cream to the rash on her left shoulder.    Subjective      Review of Systems:   Review of Systems   Musculoskeletal: Positive for arthralgias.   Skin: Positive for rash.       I have reviewed and the following portions of the patient's history were updated as appropriate: past family history, past medical history, past social history, past surgical history and problem list.    Medications:     Current Outpatient Medications:   •  amLODIPine (NORVASC) 10 MG tablet, Take 1 tablet by mouth Daily., Disp: 90 tablet, Rfl: 3  •  aspirin 81 MG tablet, Take 81 mg by mouth Daily., Disp: , Rfl:   •  atorvastatin (LIPITOR) 40 MG tablet, Take 1 tablet by mouth Daily., Disp: 90 tablet,  "Rfl: 3  •  Budeson-Glycopyrrol-Formoterol (Breztri Aerosphere) 160-9-4.8 MCG/ACT aerosol inhaler, Inhale 2 puffs 2 (Two) Times a Day., Disp: 3 each, Rfl: 3  •  butalbital-acetaminophen  MG tablet tablet, Take 1-2 tablets by mouth Every 6 (Six) Hours As Needed (migraines)., Disp: 15 each, Rfl: 0  •  chlorthalidone (HYGROTON) 25 MG tablet, TAKE 1 TABLET BY MOUTH EVERY DAY, Disp: 90 tablet, Rfl: 3  •  clopidogrel (PLAVIX) 75 MG tablet, TAKE 1 TABLET BY MOUTH DAILY, Disp: 90 tablet, Rfl: 2  •  fluticasone (FLONASE) 50 MCG/ACT nasal spray, 1 spray into the nostril(s) as directed by provider Daily As Needed., Disp: , Rfl:   •  losartan (COZAAR) 100 MG tablet, Take 1 tablet by mouth Daily., Disp: 90 tablet, Rfl: 3  •  prochlorperazine (COMPAZINE) 5 MG tablet, Take 1-2 tablets by mouth Every 6 (Six) Hours As Needed for Nausea or Vomiting (migraine)., Disp: 20 tablet, Rfl: 5  •  verapamil SR (CALAN-SR) 240 MG CR tablet, Take 1 tablet by mouth Every Night., Disp: 90 tablet, Rfl: 3  •  nitrofurantoin, macrocrystal-monohydrate, (Macrobid) 100 MG capsule, Take 1 capsule by mouth 2 (Two) Times a Day., Disp: 16 capsule, Rfl: 0  •  valACYclovir (Valtrex) 1000 MG tablet, Take 1 tablet by mouth 2 (Two) Times a Day for 7 days., Disp: 14 tablet, Rfl: 0    Allergies:   Allergies   Allergen Reactions   • Erythromycin GI Intolerance     Nausea         Objective     Physical Exam:  Vital Signs:   Vitals:    12/30/21 0923   BP: 134/58   BP Location: Left arm   Patient Position: Sitting   Cuff Size: Large Adult   Pulse: 84   Temp: 97.5 °F (36.4 °C)   TempSrc: Temporal   Weight: 94.8 kg (209 lb)   Height: 170.2 cm (67.01\")   PainSc:   8   PainLoc: Shoulder     Body mass index is 32.73 kg/m².     Physical Exam  Vitals and nursing note reviewed.   Constitutional:       General: She is not in acute distress.  HENT:      Head:      Comments: Patient wearing facial mask.  Eyes:      General: No scleral icterus.  Cardiovascular:      Rate and " Rhythm: Normal rate and regular rhythm.   Pulmonary:      Effort: Pulmonary effort is normal. No respiratory distress.   Skin:            Comments: Anterior side of left shoulder with noted vesicular type raised erythematous rash.  Posterior left shoulder area with what appears to be healing rash with mild erythema.   Neurological:      Mental Status: She is alert.   Psychiatric:         Mood and Affect: Mood normal.         Thought Content: Thought content normal.         Judgment: Judgment normal.         Assessment / Plan      Assessment/Plan:   Problems Addressed This Visit    ICD-10-CM ICD-9-CM   1. Herpes zoster without complication  B02.9 053.9   2. Primary hypertension  I10 401.9      Shingles  -Symptoms consistent with likely outbreak of herpes zoster  -Valacyclovir 1000 mg twice daily x7 days  -She may continue to use OTC hydrocortisone on the area, however at this time likely not effective  -symptoms have persisted now for about 3 weeks, hopefully will resolve with course of antiviral.  Patient advised to follow-up if pain persists    Hypertension  -Stable at this time  -Continue therapies of verapamil 240 mg nightly, Cozaar 100 mg daily, Norvasc 10 mg daily, aspirin 81 mg daily    Noted that patient is overdue for annual exam as well as screenings including mammogram, DEXA.  Discussed importance of following up and scheduling the screening tests.  Patient verbalizes understanding and agrees to do so.    Plan of care reviewed with patient at the conclusion of today's visit. Education was provided regarding diagnosis and management.  Patient verbalizes understanding of and agreement with management plan.    Patient Instructions   Shingles    Shingles, which is also known as herpes zoster, is an infection that causes a painful skin rash and fluid-filled blisters. It is caused by a virus.  Shingles only develops in people who:  · Have had chickenpox.  · Have been given a medicine to protect against  chickenpox (have been vaccinated). Shingles is rare in this group.  What are the causes?  Shingles is caused by varicella-zoster virus (VZV). This is the same virus that causes chickenpox. After a person is exposed to VZV, the virus stays in the body in an inactive (dormant) state. Shingles develops if the virus is reactivated. This can happen many years after the first (initial) exposure to VZV. It is not known what causes this virus to be reactivated.  What increases the risk?  People who have had chickenpox or received the chickenpox vaccine are at risk for shingles. Shingles infection is more common in people who:  · Are older than age 60.  · Have a weakened disease-fighting system (immune system), such as people with:  ? HIV.  ? AIDS.  ? Cancer.  · Are taking medicines that weaken the immune system, such as transplant medicines.  · Are experiencing a lot of stress.  What are the signs or symptoms?  Early symptoms of this condition include itching, tingling, and pain in an area on your skin. Pain may be described as burning, stabbing, or throbbing.  A few days or weeks after early symptoms start, a painful red rash appears. The rash is usually on one side of the body and has a band-like or belt-like pattern. The rash eventually turns into fluid-filled blisters that break open, change into scabs, and dry up in about 2-3 weeks.  At any time during the infection, you may also develop:  · A fever.  · Chills.  · A headache.  · An upset stomach.  How is this diagnosed?  This condition is diagnosed with a skin exam. Skin or fluid samples may be taken from the blisters before a diagnosis is made. These samples are examined under a microscope or sent to a lab for testing.  How is this treated?  The rash may last for several weeks. There is not a specific cure for this condition. Your health care provider will probably prescribe medicines to help you manage pain, recover more quickly, and avoid long-term problems.  Medicines may include:  · Antiviral drugs.  · Anti-inflammatory drugs.  · Pain medicines.  · Anti-itching medicines (antihistamines).  If the area involved is on your face, you may be referred to a specialist, such as an eye doctor (ophthalmologist) or an ear, nose, and throat (ENT) doctor (otolaryngologist) to help you avoid eye problems, chronic pain, or disability.  Follow these instructions at home:  Medicines  · Take over-the-counter and prescription medicines only as told by your health care provider.  · Apply an anti-itch cream or numbing cream to the affected area as told by your health care provider.  Relieving itching and discomfort    · Apply cold, wet cloths (cold compresses) to the area of the rash or blisters as told by your health care provider.  · Cool baths can be soothing. Try adding baking soda or dry oatmeal to the water to reduce itching. Do not bathe in hot water.    Blister and rash care  · Keep your rash covered with a loose bandage (dressing). Wear loose-fitting clothing to help ease the pain of material rubbing against the rash.  · Keep your rash and blisters clean by washing the area with mild soap and cool water as told by your health care provider.  · Check your rash every day for signs of infection. Check for:  ? More redness, swelling, or pain.  ? Fluid or blood.  ? Warmth.  ? Pus or a bad smell.  · Do not scratch your rash or pick at your blisters. To help avoid scratching:  ? Keep your fingernails clean and cut short.  ? Wear gloves or mittens while you sleep, if scratching is a problem.  General instructions  · Rest as told by your health care provider.  · Keep all follow-up visits as told by your health care provider. This is important.  · Wash your hands often with soap and water. If soap and water are not available, use hand . Doing this lowers your chance of getting a bacterial skin infection.  · Before your blisters change into scabs, your shingles infection can cause  chickenpox in people who have never had it or have never been vaccinated against it. To prevent this from happening, avoid contact with other people, especially:  ? Babies.  ? Pregnant women.  ? Children who have eczema.  ? Elderly people who have transplants.  ? People who have chronic illnesses, such as cancer or AIDS.  Contact a health care provider if:  · Your pain is not relieved with prescribed medicines.  · Your pain does not get better after the rash heals.  · You have signs of infection in the rash area, such as:  ? More redness, swelling, or pain around the rash.  ? Fluid or blood coming from the rash.  ? The rash area feeling warm to the touch.  ? Pus or a bad smell coming from the rash.  Get help right away if:  · The rash is on your face or nose.  · You have facial pain, pain around your eye area, or loss of feeling on one side of your face.  · You have difficulty seeing.  · You have ear pain or have ringing in your ear.  · You have a loss of taste.  · Your condition gets worse.  Summary  · Shingles, which is also known as herpes zoster, is an infection that causes a painful skin rash and fluid-filled blisters.  · This condition is diagnosed with a skin exam. Skin or fluid samples may be taken from the blisters and examined before the diagnosis is made.  · Keep your rash covered with a loose bandage (dressing). Wear loose-fitting clothing to help ease the pain of material rubbing against the rash.  · Before your blisters change into scabs, your shingles infection can cause chickenpox in people who have never had it or have never been vaccinated against it.  This information is not intended to replace advice given to you by your health care provider. Make sure you discuss any questions you have with your health care provider.  Document Revised: 04/10/2020 Document Reviewed: 08/22/2018  [employer] Patient Education © 2021 [employer]        Follow Up:   Return if symptoms worsen or fail to  improve.        JAYNE Huston  [facility] [address] [facility]    Please note that portions of this note were completed with a voice recognition program.      Answers for HPI/ROS submitted by the patient on 12/30/2021  What is the primary reason for your visit?: Rash  Chronicity: new  Onset: 1 to 4 weeks ago  Progression since onset: gradually worsening  Affected locations: chest, back, left shoulder  Characteristics: burning, pain, redness, swelling, bruising  Exposed to: nothing  joint pain: Yes

## 2022-01-13 NOTE — PROGRESS NOTES
[facility] Cardiology  Office Progress Note  Agustina Reyes  1955  [address] [place] [address]       Visit Date: 22    PCP: Rich Zaragoza MD  [address]  [place] 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 CAD   CT chest coronary calcifications  2. Left occipital  CVA with a possible LPCA stenosis by MRA, 2014  a. MRI Brain  w/o contrast, 2014:  Showing scattered foci of acute ischemia on left occipital lobe.    b. MRA of neck with and without contrast:  Focal area of the P1 segment of left posterior cerebral artery.    c. Echocardiogram, 2014, LVEF 60-65%.  No  thrombus in left atrial appendage. No PFO. Prominent Chiari network noted in right atrium.    d. Carotid duplex, 2014:  Showing no significant carotid artery stenosis bilaterally.     e. [employer] loop recorder implant, 2014 (battery life , 2018)  f. Transcranial  Doppler, 2014:  Showing normal flow and wave form seen in MELODIE, NCA, terminal ICA, PCA.  3. Hypertension.   4. Pre-Diabetes:  a.  A1c 6.1  b.  A1c 6.04  5. Exertional calf burning, possible claudification  6. Stage II COPD- Case        CT chest infectious tree/bud opacifications       CT chest - chronic tree/bud nodularity- favoring MAC  7. Cluster migranes   8. Hyperlipidemia  1.  136/81/49/71  2.  136/84/48/72  9. Former tobacco use  10. Shingles      CC: Follow-up CAD    Allergies  Allergies   Allergen Reactions   • Erythromycin GI Intolerance     Nausea         Current Medications    Current Outpatient Medications:   •  amLODIPine (NORVASC) 10 MG tablet, Take 1 tablet by mouth Daily., Disp: 90 tablet, Rfl: 3  •  aspirin 81 MG tablet, Take 81 mg by mouth Daily., Disp: , Rfl:   •  atorvastatin (LIPITOR) 40 MG tablet, Take 1 tablet by mouth Daily., Disp: 90 tablet, Rfl:  "3  •  Budeson-Glycopyrrol-Formoterol (Breztri Aerosphere) 160-9-4.8 MCG/ACT aerosol inhaler, Inhale 2 puffs 2 (Two) Times a Day., Disp: 3 each, Rfl: 3  •  butalbital-acetaminophen  MG tablet tablet, Take 1-2 tablets by mouth Every 6 (Six) Hours As Needed (migraines)., Disp: 15 each, Rfl: 0  •  chlorthalidone (HYGROTON) 25 MG tablet, TAKE 1 TABLET BY MOUTH EVERY DAY, Disp: 90 tablet, Rfl: 3  •  clopidogrel (PLAVIX) 75 MG tablet, TAKE 1 TABLET BY MOUTH DAILY, Disp: 90 tablet, Rfl: 2  •  fluticasone (FLONASE) 50 MCG/ACT nasal spray, 1 spray into the nostril(s) as directed by provider Daily As Needed., Disp: , Rfl:   •  losartan (COZAAR) 100 MG tablet, Take 1 tablet by mouth Daily., Disp: 90 tablet, Rfl: 3  •  prochlorperazine (COMPAZINE) 5 MG tablet, Take 1-2 tablets by mouth Every 6 (Six) Hours As Needed for Nausea or Vomiting (migraine)., Disp: 20 tablet, Rfl: 5  •  verapamil SR (CALAN-SR) 240 MG CR tablet, Take 1 tablet by mouth Every Night., Disp: 90 tablet, Rfl: 3  •  nitrofurantoin, macrocrystal-monohydrate, (Macrobid) 100 MG capsule, Take 1 capsule by mouth 2 (Two) Times a Day., Disp: 16 capsule, Rfl: 0      History of Present Illness   Agustina Reyes is a 66 y.o. year old female here for follow up.    Pt denies any chest pain, dyspnea, dyspnea on exertion, orthopnea, PND, palpitations,  or claudication.  She notes mild lower extremity edema on days that she works ,her only concern is taking 7 medications      OBJECTIVE:  Vitals:    01/14/22 1003   BP: 130/82   BP Location: Left arm   Patient Position: Sitting   SpO2: 96%   Weight: 92.4 kg (203 lb 9.6 oz)   Height: 170.2 cm (67\")     Body mass index is 31.89 kg/m².    Constitutional:       Appearance: Healthy appearance. Not in distress.   Neck:      Vascular: No JVR. JVD normal.   Pulmonary:      Effort: Pulmonary effort is normal.      Breath sounds: Normal breath sounds. No wheezing. No rhonchi. No rales.   Chest:      Chest wall: Not tender to " palpatation.   Cardiovascular:      PMI at left midclavicular line. Normal rate. Regular rhythm. Normal S1. Normal S2.      Murmurs: There is no murmur.      No gallop. No click. No rub.   Pulses:     Intact distal pulses.   Edema:     Peripheral edema absent.   Abdominal:      General: Bowel sounds are normal.      Palpations: Abdomen is soft.      Tenderness: There is no abdominal tenderness.   Musculoskeletal: Normal range of motion.         General: No tenderness. Skin:     General: Skin is warm and dry.   Neurological:      General: No focal deficit present.      Mental Status: Alert and oriented to person, place and time.         Diagnostic Data:  Procedures      ASSESSMENT:   Diagnosis Plan   1. Coronary artery disease involving native coronary artery of native heart without angina pectoris     2. Primary hypertension     3. Mixed hyperlipidemia         PLAN:  CAD as manifested by coronary calcium on CT scan continue aggressive risk factor modification medical management    Hypertension controlled on 4 drug regimen with dual calcium channel blockade of which she is tolerant    Mixed dyslipidemia controlled on statin therapy reiterated plaque stabilization indication for          Alex Josue MD, FACC

## 2022-01-14 ENCOUNTER — OFFICE VISIT (OUTPATIENT)
Dept: CARDIOLOGY | Facility: CLINIC | Age: 67
End: 2022-01-14

## 2022-01-14 VITALS
OXYGEN SATURATION: 96 % | WEIGHT: 203.6 LBS | BODY MASS INDEX: 31.96 KG/M2 | SYSTOLIC BLOOD PRESSURE: 130 MMHG | HEIGHT: 67 IN | DIASTOLIC BLOOD PRESSURE: 82 MMHG

## 2022-01-14 DIAGNOSIS — I25.10 CORONARY ARTERY DISEASE INVOLVING NATIVE CORONARY ARTERY OF NATIVE HEART WITHOUT ANGINA PECTORIS: Primary | ICD-10-CM

## 2022-01-14 DIAGNOSIS — E78.2 MIXED HYPERLIPIDEMIA: ICD-10-CM

## 2022-01-14 DIAGNOSIS — I10 PRIMARY HYPERTENSION: ICD-10-CM

## 2022-01-14 PROCEDURE — 99214 OFFICE O/P EST MOD 30 MIN: CPT | Performed by: INTERNAL MEDICINE

## 2022-03-20 DIAGNOSIS — I10 ESSENTIAL HYPERTENSION: ICD-10-CM

## 2022-03-20 DIAGNOSIS — E78.2 MIXED HYPERLIPIDEMIA: ICD-10-CM

## 2022-03-21 RX ORDER — ATORVASTATIN CALCIUM 40 MG/1
40 TABLET, FILM COATED ORAL DAILY
Qty: 90 TABLET | Refills: 3 | Status: SHIPPED | OUTPATIENT
Start: 2022-03-21 | End: 2023-01-19 | Stop reason: SDUPTHER

## 2022-03-21 RX ORDER — VERAPAMIL HYDROCHLORIDE 240 MG/1
240 TABLET, FILM COATED, EXTENDED RELEASE ORAL NIGHTLY
Qty: 90 TABLET | Refills: 3 | Status: SHIPPED | OUTPATIENT
Start: 2022-03-21 | End: 2023-01-19 | Stop reason: SDUPTHER

## 2022-03-21 RX ORDER — CHLORTHALIDONE 25 MG/1
TABLET ORAL
Qty: 90 TABLET | Refills: 3 | Status: SHIPPED | OUTPATIENT
Start: 2022-03-21 | End: 2023-01-19 | Stop reason: SDUPTHER

## 2022-03-21 NOTE — TELEPHONE ENCOUNTER
Next appt 1/2023  Lab Results   Component Value Date    CHOL 136 05/12/2021    CHLPL 115 02/26/2015    TRIG 84 05/12/2021    HDL 48 05/12/2021    LDL 72 05/12/2021      Lab Results   Component Value Date    GLUCOSE 105 (H) 05/12/2021    BUN 19 05/12/2021    CREATININE 0.94 05/12/2021    EGFRIFNONA 60 (L) 05/12/2021    BCR 20.2 05/12/2021    K 3.9 05/12/2021    CO2 26.0 05/12/2021    CALCIUM 9.3 05/12/2021    ALBUMIN 4.40 05/12/2021    AST 14 05/12/2021    ALT 14 05/12/2021

## 2022-05-03 ENCOUNTER — TELEPHONE (OUTPATIENT)
Dept: CARDIOLOGY | Facility: CLINIC | Age: 67
End: 2022-05-03

## 2022-05-03 RX ORDER — AMLODIPINE BESYLATE 10 MG/1
5 TABLET ORAL DAILY
Qty: 90 TABLET | Refills: 3
Start: 2022-05-03 | End: 2022-06-16 | Stop reason: SINTOL

## 2022-05-03 NOTE — TELEPHONE ENCOUNTER
Pt called to report she continues to have LE edema but unlike previous reports in 1/2022 swelling does not improve overnight. She has been on verapamil for years and amlodipine was added 7/2021. She is not having any other symptoms. No home VS to report.     We will reduce amlodipine to 5 mg daily and update on symptoms and BP readings in 1 week.     Update - 6/16/2022  LE edema persists despite lower dose amlodipine. Home BP average 134/60s. She has been on Verapamil 240 mg daily for years 1st prescribed for headaches.     She would like to discontinue amlodipine and update should BP elevate >140/80 consistently. She was encouraged to elevate legs when sitting and wear knee high compression stockings as tolerated. She will call and either FU with PCP or could consider referral to vein specialist for venous insufficiency. She is agreeable to plan.

## 2022-05-13 RX ORDER — LOSARTAN POTASSIUM 100 MG/1
100 TABLET ORAL DAILY
Qty: 90 TABLET | Refills: 3 | Status: SHIPPED | OUTPATIENT
Start: 2022-05-13

## 2022-05-16 ENCOUNTER — DOCUMENTATION (OUTPATIENT)
Dept: PULMONOLOGY | Facility: CLINIC | Age: 67
End: 2022-05-16

## 2022-05-16 NOTE — PROGRESS NOTES
Certified letter sent to patient reminding her to schedule her CT of the chest without contrast that was due in May.

## 2022-06-16 ENCOUNTER — OFFICE VISIT (OUTPATIENT)
Dept: PULMONOLOGY | Facility: CLINIC | Age: 67
End: 2022-06-16

## 2022-06-16 VITALS
TEMPERATURE: 97.3 F | BODY MASS INDEX: 32.46 KG/M2 | OXYGEN SATURATION: 95 % | WEIGHT: 206.8 LBS | HEIGHT: 67 IN | DIASTOLIC BLOOD PRESSURE: 70 MMHG | SYSTOLIC BLOOD PRESSURE: 132 MMHG | HEART RATE: 77 BPM

## 2022-06-16 DIAGNOSIS — R93.89 ABNORMAL CT OF THE CHEST: ICD-10-CM

## 2022-06-16 DIAGNOSIS — K21.00 GASTROESOPHAGEAL REFLUX DISEASE WITH ESOPHAGITIS WITHOUT HEMORRHAGE: ICD-10-CM

## 2022-06-16 DIAGNOSIS — J44.9 STAGE 2 MODERATE COPD BY GOLD CLASSIFICATION: Primary | ICD-10-CM

## 2022-06-16 PROCEDURE — 99214 OFFICE O/P EST MOD 30 MIN: CPT | Performed by: NURSE PRACTITIONER

## 2022-06-16 NOTE — PROGRESS NOTES
"[facility] Pulmonary Follow up      Chief Complaint  POST CT (F/U)    Subjective          Agustina Reyes presents to [facility] for     She was last seen by Dr. Irwin in December.  She was following her for COPD as well as an abnormal CT scan of chest, I did see her in the office as well last year.    She is on Breztri twice daily for her COPD.  She did quit smoking about 7 years ago her last PFTs were in June of last year with an FEV1 of 1.38, 52% predicted and an elevated residual volume indicating air trapping.    She states her shortness of breath is at baseline.  She denies any recent cough or congestion.  No recent acute exacerbations or illnesses.  She does complain of some occasional hoarseness.    She is also being followed for an abnormal CT scan of the chest.  Her last was in November 2021 with some mild improvement of some diffuse upper predominant areas of tree-in-bud nodularity.  Dr. Irwin did recommend strict aspiration precautions and treatment of her reflux.  She is having no signs of any acute infectious process.  She did want her to have a follow-up in 6 months, that scan has not been scheduled.      Objective     Vital Signs:   /70 (BP Location: Left arm, Patient Position: Sitting, Cuff Size: Adult)   Pulse 77   Temp 97.3 °F (36.3 °C) (Infrared)   Ht 170.2 cm (67.01\")   Wt 93.8 kg (206 lb 12.8 oz)   SpO2 95% Comment: room air, at rest  BMI 32.38 kg/m²       Immunization History   Administered Date(s) Administered   • COVID-19 (PFIZER) PURPLE CAP 02/15/2021, 03/09/2021, 03/27/2021, 11/22/2021   • Fluzone Split Quad (Multi-dose) 03/10/2019, 11/22/2021   • Influenza Quad Vaccine (Inpatient) 03/10/2019, 08/28/2020   • Shingrix 12/15/2019, 02/09/2020       Physical Exam  Vitals reviewed.   Constitutional:       General: She is not in acute distress.     Appearance: She is well-developed. She is not ill-appearing.   HENT:      Head: " Normocephalic and atraumatic.   Eyes:      Pupils: Pupils are equal, round, and reactive to light.   Cardiovascular:      Rate and Rhythm: Normal rate and regular rhythm.      Heart sounds: No murmur heard.  Pulmonary:      Effort: Pulmonary effort is normal. No respiratory distress.      Breath sounds: Normal breath sounds. No wheezing or rales.   Abdominal:      General: Bowel sounds are normal. There is no distension.      Palpations: Abdomen is soft.   Musculoskeletal:         General: Normal range of motion.      Cervical back: Normal range of motion and neck supple.   Skin:     General: Skin is warm and dry.      Findings: No erythema.   Neurological:      Mental Status: She is alert and oriented to person, place, and time.   Psychiatric:         Behavior: Behavior normal.          Result Review :                           Assessment and Plan    Problem List Items Addressed This Visit        Pulmonary and Pneumonias    Stage 2 moderate COPD by GOLD classification (HCC) - Primary       Symptoms and Signs    Abnormal CT of the chest       Other    Gastroesophageal reflux disease with esophagitis without hemorrhage        At this time she will continue on her Breztri.  She seems to be doing pretty good from a pulmonary standpoint.  She will need repeat full PFTs on her next office visit.    We did discuss her follow-up CT scan of the chest, seem to be in some confusion in the scheduling.  We will try to get that scheduled as soon as possible.  Again she is continue to have no signs of infection.  She has no cough, fevers or chills, night sweats, weight loss or appetite changes.  We will call her with any changes.    Continue with her reflux precautions.    Follow-up with Dr. Irwin in 6 months with full PFTs.    Follow Up     No follow-ups on file.  Patient was given instructions and counseling regarding her condition or for health maintenance advice. Please see specific information pulled into the AVS if  appropriate.     I spent 35 minutes caring for Agustina on this date of service. This time includes time spent by me in the following activities:preparing for the visit, reviewing tests, obtaining and/or reviewing a separately obtained history, performing a medically appropriate examination and/or evaluation , counseling and educating the patient/family/caregiver, ordering medications, tests, or procedures, referring and communicating with other health care professionals , documenting information in the medical record and independently interpreting results and communicating that information with the patient/family/caregiver    Moderate level of Medical Decision Making complexity based on 2 or more chronic stable illnesses and prescription drug management.    Eusebia Gomes APRN, ACNP  [facility] Pulmonary Critical Care Medicine  Electronically signed

## 2022-06-27 ENCOUNTER — HOSPITAL ENCOUNTER (OUTPATIENT)
Dept: CT IMAGING | Facility: HOSPITAL | Age: 67
Discharge: HOME | End: 2022-06-27
Admitting: NURSE PRACTITIONER

## 2022-06-27 DIAGNOSIS — R93.89 ABNORMAL CT OF THE CHEST: ICD-10-CM

## 2022-06-27 PROCEDURE — 71250 CT THORAX DX C-: CPT

## 2022-07-01 ENCOUNTER — APPOINTMENT (OUTPATIENT)
Dept: CT IMAGING | Facility: HOSPITAL | Age: 67
End: 2022-07-01

## 2022-07-18 ENCOUNTER — TELEPHONE (OUTPATIENT)
Dept: PULMONOLOGY | Facility: CLINIC | Age: 67
End: 2022-07-18

## 2022-07-18 RX ORDER — PREDNISONE 10 MG/1
TABLET ORAL
Qty: 9 TABLET | Refills: 0 | Status: SHIPPED | OUTPATIENT
Start: 2022-07-18 | End: 2022-07-22

## 2022-07-18 RX ORDER — AZITHROMYCIN 250 MG/1
TABLET, FILM COATED ORAL
Qty: 6 TABLET | Refills: 0 | Status: SHIPPED | OUTPATIENT
Start: 2022-07-18 | End: 2022-12-08

## 2022-07-18 NOTE — TELEPHONE ENCOUNTER
Patient called, stested positive for covid on Saturday, however symptoms first started on Friday. Gen malaise, productive cough, do not know patient's o2 sat% as she doesn't own an oximeter. She wants to know if she should start an rx for paxlovid or if she should be on abx since her sputum is green. Please advise.

## 2022-07-22 ENCOUNTER — TELEPHONE (OUTPATIENT)
Dept: PULMONOLOGY | Facility: CLINIC | Age: 67
End: 2022-07-22

## 2022-07-22 RX ORDER — PREDNISONE 10 MG/1
TABLET ORAL
Qty: 14 TABLET | Refills: 0 | Status: SHIPPED | OUTPATIENT
Start: 2022-07-22 | End: 2022-12-08

## 2022-07-22 RX ORDER — AMOXICILLIN AND CLAVULANATE POTASSIUM 875; 125 MG/1; MG/1
1 TABLET, FILM COATED ORAL 2 TIMES DAILY
Qty: 20 TABLET | Refills: 0 | Status: SHIPPED | OUTPATIENT
Start: 2022-07-22 | End: 2022-12-08

## 2022-07-22 RX ORDER — ALBUTEROL SULFATE 90 UG/1
2 AEROSOL, METERED RESPIRATORY (INHALATION) EVERY 4 HOURS PRN
Qty: 18 G | Refills: 5 | Status: SHIPPED | OUTPATIENT
Start: 2022-07-22

## 2022-07-22 NOTE — TELEPHONE ENCOUNTER
She called today regarding follow-up on her COVID infection.  She finished her ZPack today and one steroid left.  No real improvement in her congestions.   Still so congested, still yellow to green color when productive. Mostly sinus congestion  Still on breztri and mucinex twice a day started back on the flonase as well.   Oxygen saturations remaining in the 90s.  It does seem like she is having a satisfaction on top of all this, calling reports improvement as well as a few more days of the prednisone.  We also discussed using an albuterol rescue inhaler as needed.  I sent that in for her today as well.

## 2022-07-25 DIAGNOSIS — R93.89 ABNORMAL CT OF THE CHEST: Primary | ICD-10-CM

## 2022-07-27 ENCOUNTER — TELEPHONE (OUTPATIENT)
Dept: PULMONOLOGY | Facility: CLINIC | Age: 67
End: 2022-07-27

## 2022-07-27 NOTE — TELEPHONE ENCOUNTER
She wanted to see if Eusebia has received the MARCUS forms from [employer] since she has exhausted all of her PTO. She also still has a cough but is getting better each day. Her main concern was with congestion.    S/W Eusebia and she said she will have a cough for a while and the congestion is normal due to having covid. Eusebia also has already filled out the MARCUS forms for [employer] and fax to the appropriate number.    Patient verbalized understanding and had no further questions.

## 2022-08-11 RX ORDER — CLOPIDOGREL BISULFATE 75 MG/1
75 TABLET ORAL DAILY
Qty: 90 TABLET | Refills: 3 | Status: SHIPPED | OUTPATIENT
Start: 2022-08-11 | End: 2023-01-19 | Stop reason: SDUPTHER

## 2022-11-26 RX ORDER — PREDNISONE 10 MG/1
TABLET ORAL
Qty: 42 TABLET | Refills: 0 | Status: SHIPPED | OUTPATIENT
Start: 2022-11-26 | End: 2022-12-08

## 2022-11-26 RX ORDER — DOXYCYCLINE HYCLATE 100 MG/1
100 CAPSULE ORAL 2 TIMES DAILY
Qty: 14 CAPSULE | Refills: 0 | Status: SHIPPED | OUTPATIENT
Start: 2022-11-26 | End: 2022-12-08

## 2022-11-26 NOTE — PROGRESS NOTES
Patient called in on a Saturday morning complaining of off-and-on fever since Tuesday as well as some congestion both upper and lower respiratory wise.  Oxygen saturations were in the 90s.  She does have COPD.  She is not on chronic steroids and has not had antibiotics recently.  She took a COVID test which was -1 or 2 days ago and was negative.  I suspect a viral syndrome and may be even influenza though she has been vaccinated.  Nevertheless, will prescribe 7 days of doxycycline and a steroid pulse and taper, which she has done before.  If she does not improve or worsen she will call our office or present to the ER for further in person evaluation.  It would be too late for Tamiflu so I did not suggest that she get tested for influenza other than for informational purposes.    Jovany Hagan MD

## 2022-12-08 ENCOUNTER — OFFICE VISIT (OUTPATIENT)
Dept: PULMONOLOGY | Facility: CLINIC | Age: 67
End: 2022-12-08

## 2022-12-08 VITALS
TEMPERATURE: 97.7 F | HEART RATE: 73 BPM | BODY MASS INDEX: 33.81 KG/M2 | SYSTOLIC BLOOD PRESSURE: 134 MMHG | DIASTOLIC BLOOD PRESSURE: 60 MMHG | OXYGEN SATURATION: 92 % | HEIGHT: 67 IN | WEIGHT: 215.4 LBS

## 2022-12-08 DIAGNOSIS — J44.9 STAGE 2 MODERATE COPD BY GOLD CLASSIFICATION: Primary | ICD-10-CM

## 2022-12-08 DIAGNOSIS — R93.89 ABNORMAL CT OF THE CHEST: ICD-10-CM

## 2022-12-08 DIAGNOSIS — J01.80 OTHER ACUTE SINUSITIS, RECURRENCE NOT SPECIFIED: ICD-10-CM

## 2022-12-08 PROCEDURE — 99214 OFFICE O/P EST MOD 30 MIN: CPT | Performed by: INTERNAL MEDICINE

## 2022-12-08 PROCEDURE — 94375 RESPIRATORY FLOW VOLUME LOOP: CPT | Performed by: INTERNAL MEDICINE

## 2022-12-08 PROCEDURE — 94726 PLETHYSMOGRAPHY LUNG VOLUMES: CPT | Performed by: INTERNAL MEDICINE

## 2022-12-08 PROCEDURE — 94729 DIFFUSING CAPACITY: CPT | Performed by: INTERNAL MEDICINE

## 2022-12-08 RX ORDER — BUDESONIDE, GLYCOPYRROLATE, AND FORMOTEROL FUMARATE 160; 9; 4.8 UG/1; UG/1; UG/1
AEROSOL, METERED RESPIRATORY (INHALATION)
COMMUNITY
End: 2022-12-08

## 2022-12-08 RX ORDER — AMOXICILLIN AND CLAVULANATE POTASSIUM 875; 125 MG/1; MG/1
1 TABLET, FILM COATED ORAL 2 TIMES DAILY
Qty: 20 TABLET | Refills: 0 | Status: SHIPPED | OUTPATIENT
Start: 2022-12-08 | End: 2023-01-19

## 2022-12-08 NOTE — PROGRESS NOTES
"Pulmonary Office Follow Up      Subjective   Chief Complaint: Shortness of Breath    Agustina Reyes is a 67 y.o. female is being seen in follow up for COPD    History of Present Illness    Ms. Reyes is a 68yo F who is followed for COPD and an abnormal CT scan of the chest. She was last seen in clinic on 6/16/22 by JAYNE Mendez.     She returns to clinic today for follow up. Since she was last seen, she was diagnosed with COVID in July and treated for an exacerbation with Azithromycin and Prednisone. She then had a Flu-like illness at the end of November causing a COPD exacerbation and was prescribed a course of Doxycycline and Prednisone taper.      She notes today that she finished her antibiotics and took her last dose of Prednisone yesterday but she continues to have sinus pain/pressure and yellow/green drainage from her head along with a severe sore throat and coughing.     The following portions of the patient's history were reviewed and updated as appropriate: allergies, current medications, past family history, past medical history, past social history, past surgical history and problem list.    Review of Systems   Constitutional: Negative.    HENT: Positive for rhinorrhea, sinus pressure, sinus pain and sore throat.    Eyes: Negative.    Respiratory: Positive for cough and shortness of breath.    Cardiovascular: Negative.    Gastrointestinal: Negative.    Endocrine: Negative.    Genitourinary: Negative.    Musculoskeletal: Negative.    Skin: Negative.    Allergic/Immunologic: Negative.    Neurological: Negative.    Hematological: Negative.    Psychiatric/Behavioral: Negative.           Objective   Blood pressure 134/60, pulse 73, temperature 97.7 °F (36.5 °C), temperature source Infrared, height 170.2 cm (67.01\"), weight 97.7 kg (215 lb 6.4 oz), SpO2 92 %, not currently breastfeeding.  Physical Exam  Vitals and nursing note reviewed.   Constitutional:       General: She is not in acute distress.   "   Appearance: She is well-developed.   HENT:      Head: Normocephalic and atraumatic.   Eyes:      General: No scleral icterus.     Conjunctiva/sclera: Conjunctivae normal.      Pupils: Pupils are equal, round, and reactive to light.   Neck:      Thyroid: No thyromegaly.      Trachea: No tracheal deviation.   Cardiovascular:      Rate and Rhythm: Normal rate and regular rhythm.      Heart sounds: Normal heart sounds.   Pulmonary:      Effort: Pulmonary effort is normal. No respiratory distress.      Breath sounds: Normal breath sounds.   Abdominal:      General: Bowel sounds are normal.      Palpations: Abdomen is soft.      Tenderness: There is no abdominal tenderness.   Musculoskeletal:         General: Normal range of motion.      Cervical back: Normal range of motion and neck supple.   Lymphadenopathy:      Cervical: No cervical adenopathy.   Skin:     General: Skin is warm and dry.      Findings: No erythema or rash.   Neurological:      Mental Status: She is alert and oriented to person, place, and time.      Motor: No abnormal muscle tone.      Coordination: Coordination normal.   Psychiatric:         Speech: Speech normal.         Behavior: Behavior normal.         Judgment: Judgment normal.         PFTs:  Performed in clinic and personally reviewed.   There is no airway obstruction. The FEV1 has increased from 1.38 to 1.52L when compared to 6/7/21.  There is mild restriction.   The DLCO is borderline reduced.     Imaging:  CT Chest from 6/27/22 reviewed. There has been no change in appearance of the tree-in-bud nodules in the upper lobes, right middle lobe and lingula. There is a new tubular opacity in the medial left upper lobe. There is a 6mm nodule in the anterior right lower lobe     Assessment & Plan   Diagnoses and all orders for this visit:    1. Stage 2 moderate COPD by GOLD classification (HCC) (Primary)  -     Pulmonary Function Test  -     HYDROcod Polst-CPM Polst ER (Tussionex Pennkinetic ER)  10-8 MG/5ML ER suspension; Take 5 mL by mouth Every 12 (Twelve) Hours As Needed for Cough.  Dispense: 115 mL; Refill: 0    2. Abnormal CT of the chest    3. Other acute sinusitis, recurrence not specified    Other orders  -     amoxicillin-clavulanate (AUGMENTIN) 875-125 MG per tablet; Take 1 tablet by mouth 2 (Two) Times a Day.  Dispense: 20 tablet; Refill: 0        Discussion:  Ms. Reyes is a 68yo F who is followed for COPD and Pulmonary Nodules     1. Moderate COPD  - Treated for 2 exacerbations since her last visit.   - Continue Breztri.   - Continue Albuterol as needed.   - Tussionex for cough.      2. Abnormal CT Chest  - Tree-in-bud nodularity first noticed in May 2021.   - Most recent CT chest with stable nodularity and a new tubular opacity in the medial left upper lobe.   - Repeat CT chest in January 2023 for further evaluation.   - I have asked her to let us know if she develops any systemic symptoms of MAC infection and we discussed these in clinic as we would need to perform a bronchoscopy at that time for evaluation.      3. Allergic Rhinitis/Sinusitis  - Continue OTC antihistamine   - Continue Mucinex.   - Stop Flonase and use nasal sinus rinses instead.   - Treat with a course of Augmentin.      4. GERD  - Elevate the head of the bed.   - Do not eat within 2-3 hours of bedtime when possible.     Follow up in January/February after repeat CT chest.            Aria MARRERO Case, DO  Pulmonary and Critical Care Medicine  Note Electronically Signed

## 2022-12-09 DIAGNOSIS — R91.8 GROUND GLASS OPACITY PRESENT ON IMAGING OF LUNG: Primary | ICD-10-CM

## 2023-01-18 DIAGNOSIS — J44.9 STAGE 2 MODERATE COPD BY GOLD CLASSIFICATION: ICD-10-CM

## 2023-01-18 RX ORDER — BUDESONIDE, GLYCOPYRROLATE, AND FORMOTEROL FUMARATE 160; 9; 4.8 UG/1; UG/1; UG/1
2 AEROSOL, METERED RESPIRATORY (INHALATION) 2 TIMES DAILY
Qty: 3 EACH | Refills: 3 | Status: SHIPPED | OUTPATIENT
Start: 2023-01-18

## 2023-01-19 ENCOUNTER — HOSPITAL ENCOUNTER (OUTPATIENT)
Dept: CT IMAGING | Facility: HOSPITAL | Age: 68
Discharge: HOME | End: 2023-01-19
Admitting: NURSE PRACTITIONER
Payer: COMMERCIAL

## 2023-01-19 ENCOUNTER — OFFICE VISIT (OUTPATIENT)
Dept: CARDIOLOGY | Facility: CLINIC | Age: 68
End: 2023-01-19
Payer: COMMERCIAL

## 2023-01-19 ENCOUNTER — APPOINTMENT (OUTPATIENT)
Dept: CT IMAGING | Facility: HOSPITAL | Age: 68
End: 2023-01-19

## 2023-01-19 VITALS
SYSTOLIC BLOOD PRESSURE: 142 MMHG | BODY MASS INDEX: 33.75 KG/M2 | DIASTOLIC BLOOD PRESSURE: 70 MMHG | OXYGEN SATURATION: 95 % | HEART RATE: 80 BPM | HEIGHT: 66 IN | WEIGHT: 210 LBS

## 2023-01-19 DIAGNOSIS — I10 ESSENTIAL HYPERTENSION: ICD-10-CM

## 2023-01-19 DIAGNOSIS — R91.8 GROUND GLASS OPACITY PRESENT ON IMAGING OF LUNG: ICD-10-CM

## 2023-01-19 DIAGNOSIS — I10 PRIMARY HYPERTENSION: ICD-10-CM

## 2023-01-19 DIAGNOSIS — I25.10 CORONARY ARTERY DISEASE INVOLVING NATIVE CORONARY ARTERY OF NATIVE HEART WITHOUT ANGINA PECTORIS: Primary | ICD-10-CM

## 2023-01-19 DIAGNOSIS — E78.2 MIXED HYPERLIPIDEMIA: ICD-10-CM

## 2023-01-19 PROCEDURE — 71250 CT THORAX DX C-: CPT

## 2023-01-19 PROCEDURE — 93000 ELECTROCARDIOGRAM COMPLETE: CPT | Performed by: INTERNAL MEDICINE

## 2023-01-19 PROCEDURE — 99214 OFFICE O/P EST MOD 30 MIN: CPT | Performed by: INTERNAL MEDICINE

## 2023-01-19 RX ORDER — CLOPIDOGREL BISULFATE 75 MG/1
75 TABLET ORAL DAILY
Qty: 90 TABLET | Refills: 3 | Status: SHIPPED | OUTPATIENT
Start: 2023-01-19

## 2023-01-19 RX ORDER — ATORVASTATIN CALCIUM 40 MG/1
40 TABLET, FILM COATED ORAL DAILY
Qty: 90 TABLET | Refills: 3 | Status: SHIPPED | OUTPATIENT
Start: 2023-01-19

## 2023-01-19 RX ORDER — VERAPAMIL HYDROCHLORIDE 240 MG/1
240 TABLET, FILM COATED, EXTENDED RELEASE ORAL NIGHTLY
Qty: 90 TABLET | Refills: 3 | Status: SHIPPED | OUTPATIENT
Start: 2023-01-19

## 2023-01-19 RX ORDER — CHLORTHALIDONE 25 MG/1
25 TABLET ORAL DAILY
Qty: 90 TABLET | Refills: 3 | Status: SHIPPED | OUTPATIENT
Start: 2023-01-19

## 2023-01-19 NOTE — PROGRESS NOTES
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 CAD   CT chest coronary calcifications  2. Left occipital  CVA with a possible LPCA stenosis by MRA, 2014  a. MRI Brain  w/o contrast, 2014:  Showing scattered foci of acute ischemia on left occipital lobe.    b. MRA of neck with and without contrast:  Focal area of the P1 segment of left posterior cerebral artery.    c. Echocardiogram, 2014, LVEF 60-65%.  No  thrombus in left atrial appendage. No PFO. Prominent Chiari network noted in right atrium.    d. Carotid duplex, 2014:  Showing no significant carotid artery stenosis bilaterally.     e. Shoto loop recorder implant, 2014 (battery life , 2018)  f. Transcranial  Doppler, 2014:  Showing normal flow and wave form seen in MELODIE, NCA, terminal ICA, PCA.  3. Hypertension.   4. Pre-Diabetes:  a.  A1c 6.1  b.  A1c 6.04  5. Exertional calf burning, possible claudification  6. Stage II COPD- Case        CT chest infectious tree/bud opacifications       CT chest - chronic tree/bud nodularity- favoring MAC  7. Cluster migranes   8. Hyperlipidemia  1.  136/81/49/71  2.  136/84/48/72  9. Former tobacco use  10. Shingles  11. COVID-19  outpatient antibiotic prednisone      CC: Follow-up CAD    Allergies  Allergies   Allergen Reactions   • Erythromycin GI Intolerance     Nausea         Current Medications    Current Outpatient Medications:   •  albuterol sulfate  (90 Base) MCG/ACT inhaler, Inhale 2 puffs Every 4 (Four) Hours As Needed for Wheezing., Disp: 18 g, Rfl: 5  •  aspirin 81 MG tablet, Take 81 mg by mouth Daily., Disp:  ", Rfl:   •  atorvastatin (LIPITOR) 40 MG tablet, TAKE 1 TABLET BY MOUTH DAILY, Disp: 90 tablet, Rfl: 3  •  Budeson-Glycopyrrol-Formoterol (Breztri Aerosphere) 160-9-4.8 MCG/ACT aerosol inhaler, Inhale 2 puffs 2 (Two) Times a Day., Disp: 3 each, Rfl: 3  •  butalbital-acetaminophen  MG tablet tablet, Take 1-2 tablets by mouth Every 6 (Six) Hours As Needed (migraines)., Disp: 15 each, Rfl: 0  •  chlorthalidone (HYGROTON) 25 MG tablet, TAKE 1 TABLET BY MOUTH EVERY DAY, Disp: 90 tablet, Rfl: 3  •  clopidogrel (PLAVIX) 75 MG tablet, Take 1 tablet by mouth Daily., Disp: 90 tablet, Rfl: 3  •  losartan (COZAAR) 100 MG tablet, TAKE 1 TABLET BY MOUTH DAILY, Disp: 90 tablet, Rfl: 3  •  prochlorperazine (COMPAZINE) 5 MG tablet, Take 1-2 tablets by mouth Every 6 (Six) Hours As Needed for Nausea or Vomiting (migraine)., Disp: 20 tablet, Rfl: 5  •  verapamil SR (CALAN-SR) 240 MG CR tablet, TAKE 1 TABLET BY MOUTH EVERY NIGHT, Disp: 90 tablet, Rfl: 3      History of Present Illness   Agustina Reyes is a 67 y.o. year old female here for follow up.  There is no crescendo shortness of breath with activities on occasion and some mild cosmetic bruising over her arms.  She does not follow regularly primary care provider and wants to have blood work obtained      OBJECTIVE:  Vitals:    01/19/23 1454   BP: 142/70   BP Location: Right arm   Patient Position: Sitting   Cuff Size: Adult   Pulse: 80   SpO2: 95%   Weight: 95.3 kg (210 lb)   Height: 167.6 cm (66\")     Body mass index is 33.89 kg/m².    Constitutional:       Appearance: Healthy appearance. Not in distress.   Neck:      Vascular: No JVR. JVD normal.   Pulmonary:      Effort: Pulmonary effort is normal.      Breath sounds: Normal breath sounds. No wheezing. No rhonchi. No rales.   Chest:      Chest wall: Not tender to palpatation.   Cardiovascular:      PMI at left midclavicular line. Normal rate. Regular rhythm. Normal S1. Normal S2.      Murmurs: There is no murmur.      " No gallop. No click. No rub.   Pulses:     Intact distal pulses.   Edema:     Peripheral edema absent.   Abdominal:      General: Bowel sounds are normal.      Palpations: Abdomen is soft.      Tenderness: There is no abdominal tenderness.   Musculoskeletal: Normal range of motion.         General: No tenderness. Skin:     General: Skin is warm and dry.   Neurological:      General: No focal deficit present.      Mental Status: Alert and oriented to person, place and time.         Diagnostic Data:    ECG 12 Lead    Date/Time: 1/19/2023 3:11 PM  Performed by: Alex Josue MD  Authorized by: Alex Josue MD   Comparison: compared with previous ECG from 8/3/2014  Comparison to previous ECG: NJ interval short  Rhythm: sinus rhythm  BPM: 80  Other findings: left ventricular hypertrophy    Clinical impression: abnormal EKG              ASSESSMENT:   Diagnosis Plan   1. Coronary artery disease involving native coronary artery of native heart without angina pectoris        2. Primary hypertension        3. Mixed hyperlipidemia            PLAN:  CAD as manifested by coronary calcium on CT scan continue aggressive risk factor modification medical management    Hypertension controlled on 4 drug regimen with dual calcium channel blockade of which she is tolerant    Mixed dyslipidemia controlled on statin therapy reiterated plaque stabilization indication for    Orders for serologies afforded to her per        Alex Josue MD, [facility]

## 2023-01-23 ENCOUNTER — LAB (OUTPATIENT)
Dept: LAB | Facility: HOSPITAL | Age: 68
End: 2023-01-23
Payer: COMMERCIAL

## 2023-01-23 ENCOUNTER — TRANSCRIBE ORDERS (OUTPATIENT)
Dept: LAB | Facility: HOSPITAL | Age: 68
End: 2023-01-23
Payer: COMMERCIAL

## 2023-01-23 DIAGNOSIS — I10 HYPERTENSION, UNSPECIFIED TYPE: Primary | ICD-10-CM

## 2023-01-23 DIAGNOSIS — I10 HYPERTENSION, UNSPECIFIED TYPE: ICD-10-CM

## 2023-01-23 PROCEDURE — 80053 COMPREHEN METABOLIC PANEL: CPT

## 2023-01-23 PROCEDURE — 36415 COLL VENOUS BLD VENIPUNCTURE: CPT

## 2023-01-23 PROCEDURE — 86800 THYROGLOBULIN ANTIBODY: CPT

## 2023-01-23 PROCEDURE — 83036 HEMOGLOBIN GLYCOSYLATED A1C: CPT

## 2023-01-23 PROCEDURE — 80061 LIPID PANEL: CPT

## 2023-01-23 PROCEDURE — 86376 MICROSOMAL ANTIBODY EACH: CPT

## 2023-01-24 LAB
ALBUMIN SERPL-MCNC: 4.3 G/DL (ref 3.5–5.2)
ALBUMIN/GLOB SERPL: 1.7 G/DL
ALP SERPL-CCNC: 72 U/L (ref 39–117)
ALT SERPL W P-5'-P-CCNC: 13 U/L (ref 1–33)
ANION GAP SERPL CALCULATED.3IONS-SCNC: 13.2 MMOL/L (ref 5–15)
AST SERPL-CCNC: 8 U/L (ref 1–32)
BILIRUB SERPL-MCNC: 0.2 MG/DL (ref 0–1.2)
BUN SERPL-MCNC: 15 MG/DL (ref 8–23)
BUN/CREAT SERPL: 16 (ref 7–25)
CALCIUM SPEC-SCNC: 9.4 MG/DL (ref 8.6–10.5)
CHLORIDE SERPL-SCNC: 104 MMOL/L (ref 98–107)
CHOLEST SERPL-MCNC: 127 MG/DL (ref 0–200)
CO2 SERPL-SCNC: 26.8 MMOL/L (ref 22–29)
CREAT SERPL-MCNC: 0.94 MG/DL (ref 0.57–1)
EGFRCR SERPLBLD CKD-EPI 2021: 66.6 ML/MIN/1.73
GLOBULIN UR ELPH-MCNC: 2.5 GM/DL
GLUCOSE SERPL-MCNC: 104 MG/DL (ref 65–99)
HBA1C MFR BLD: 6.1 % (ref 4.8–5.6)
HDLC SERPL-MCNC: 49 MG/DL (ref 40–60)
LDLC SERPL CALC-MCNC: 63 MG/DL (ref 0–100)
LDLC/HDLC SERPL: 1.28 {RATIO}
POTASSIUM SERPL-SCNC: 3.9 MMOL/L (ref 3.5–5.2)
PROT SERPL-MCNC: 6.8 G/DL (ref 6–8.5)
SODIUM SERPL-SCNC: 144 MMOL/L (ref 136–145)
THYROGLOB AB SERPL-ACNC: <1 IU/ML (ref 0–0.9)
THYROPEROXIDASE AB SERPL-ACNC: <9 IU/ML (ref 0–34)
TRIGL SERPL-MCNC: 77 MG/DL (ref 0–150)
VLDLC SERPL-MCNC: 15 MG/DL (ref 5–40)

## 2023-01-25 ENCOUNTER — TELEPHONE (OUTPATIENT)
Dept: CARDIOLOGY | Facility: CLINIC | Age: 68
End: 2023-01-25
Payer: COMMERCIAL

## 2023-01-25 NOTE — TELEPHONE ENCOUNTER
Ms. Reyes called and notified of her routine lab results. Lipid panel, CMP, A1c and thyroid antibodies were all within normal limits. Hemoglobin A1c was in prediabetic range at 6.1. To prevent development of diabetes, diet and exercise recommendations were discussed with patient including deliberate physical activity >150 minutes/week and avoiding carb-rich foods such as pasta, rice, potatoes, bread and anything with excess sugar.   Atif Garcia PA-C

## 2023-02-03 ENCOUNTER — TELEMEDICINE (OUTPATIENT)
Dept: INTERNAL MEDICINE | Facility: CLINIC | Age: 68
End: 2023-02-03
Payer: COMMERCIAL

## 2023-02-03 DIAGNOSIS — J01.10 ACUTE FRONTAL SINUSITIS, RECURRENCE NOT SPECIFIED: Primary | ICD-10-CM

## 2023-02-03 PROCEDURE — 99213 OFFICE O/P EST LOW 20 MIN: CPT | Performed by: NURSE PRACTITIONER

## 2023-02-03 RX ORDER — AMOXICILLIN 875 MG/1
875 TABLET, COATED ORAL 2 TIMES DAILY
Qty: 14 TABLET | Refills: 0 | Status: SHIPPED | OUTPATIENT
Start: 2023-02-03 | End: 2023-02-10

## 2023-02-03 RX ORDER — METHYLPREDNISOLONE 4 MG/1
TABLET ORAL
Qty: 1 EACH | Refills: 0 | Status: SHIPPED | OUTPATIENT
Start: 2023-02-03 | End: 2023-02-27

## 2023-02-03 NOTE — PROGRESS NOTES
Chief Complaint  Sinusitis    Subjective      This provider is located at the [address]  (through [facility]), using a secure MyChart Video Visit through [employer]. Patient is being seen remotely via telehealth at their home address in [place], and stated they are in a secure environment for this session. The patient's condition being diagnosed/treated is appropriate for telemedicine. The provider identified herself as well as her credentials. The patient, and/or patients guardian, consent to be seen remotely, and when consent is given they understand that the consent allows for patient identifiable information to be sent to a third party as needed. They may refuse to be seen remotely at any time. The electronic data is encrypted and password protected, and the patient and/or guardian has been advised of the potential risks to privacy not withstanding such measures.    You have chosen to receive care through a telehealth visit. Do you consent to use a video/audio connection for your medical care today? Yes  Agustinagermaine Reyes presents to [facility]  History of Present Illness  The patient reports onset of right sided sinus pressure, headache for four days. She is taking Tylenol for headache every six hours with relief lasting only for a short time. She is taking the maximum allowed dosing of Tylenol and thinks she may have taken it more often than recommended to control her pain. Last sinusitis in 2020. Pain is 8-9 on pain scale. Positive history of migraine. Patient denies neurological deficits.     Review of Systems   Constitutional: Positive for appetite change, chills and fatigue. Negative for diaphoresis and fever.   HENT: Positive for postnasal drip, sinus pressure and sinus pain. Negative for ear pain, facial swelling, rhinorrhea, sneezing and sore throat.    Eyes: Positive for photophobia and pain. Negative for discharge, redness,  "itching and visual disturbance.   Respiratory: Negative for cough and shortness of breath.    Gastrointestinal: Positive for nausea. Negative for diarrhea and vomiting.   Neurological: Positive for light-headedness and headaches. Negative for dizziness, tremors, syncope, facial asymmetry, speech difficulty, weakness and numbness.     Objective   Vital Signs:  There were no vitals taken for this visit.  Estimated body mass index is 33.89 kg/m² as calculated from the following:    Height as of 1/19/23: 167.6 cm (66\").    Weight as of 1/19/23: 95.3 kg (210 lb).             Physical Exam   Result Review :                   Assessment and Plan   Diagnoses and all orders for this visit:    1. Acute frontal sinusitis, recurrence not specified (Primary)  -     methylPREDNISolone (MEDROL) 4 MG dose pack; Take as directed on package instructions.  Dispense: 1 each; Refill: 0  -     amoxicillin (AMOXIL) 875 MG tablet; Take 1 tablet by mouth 2 (Two) Times a Day for 7 days.  Dispense: 14 tablet; Refill: 0    Discussed treatment options with patient. Medication as directed. Recommend patient schedule an appointment if symptoms persist.         Follow Up   Return if symptoms worsen or fail to improve.  Patient was given instructions and counseling regarding her condition or for health maintenance advice. Please see specific information pulled into the AVS if appropriate.       "

## 2023-02-27 ENCOUNTER — OFFICE VISIT (OUTPATIENT)
Dept: PULMONOLOGY | Facility: CLINIC | Age: 68
End: 2023-02-27
Payer: COMMERCIAL

## 2023-02-27 VITALS
BODY MASS INDEX: 34.68 KG/M2 | RESPIRATION RATE: 14 BRPM | OXYGEN SATURATION: 96 % | HEART RATE: 70 BPM | HEIGHT: 66 IN | TEMPERATURE: 97.5 F | WEIGHT: 215.8 LBS | SYSTOLIC BLOOD PRESSURE: 144 MMHG | DIASTOLIC BLOOD PRESSURE: 54 MMHG

## 2023-02-27 DIAGNOSIS — J30.89 NON-SEASONAL ALLERGIC RHINITIS, UNSPECIFIED TRIGGER: ICD-10-CM

## 2023-02-27 DIAGNOSIS — R91.1 INCIDENTAL LUNG NODULE, GREATER THAN OR EQUAL TO 8MM: Primary | ICD-10-CM

## 2023-02-27 DIAGNOSIS — J44.9 STAGE 2 MODERATE COPD BY GOLD CLASSIFICATION: ICD-10-CM

## 2023-02-27 PROCEDURE — 99214 OFFICE O/P EST MOD 30 MIN: CPT | Performed by: INTERNAL MEDICINE

## 2023-02-27 RX ORDER — AMOXICILLIN AND CLAVULANATE POTASSIUM 875; 125 MG/1; MG/1
1 TABLET, FILM COATED ORAL 2 TIMES DAILY
Qty: 20 TABLET | Refills: 0 | Status: SHIPPED | OUTPATIENT
Start: 2023-02-27

## 2023-02-27 NOTE — PROGRESS NOTES
"Pulmonary Office Follow Up      Subjective   Chief Complaint: Shortness of Breath    Agustina Reyes is a 67 y.o. female is being seen in follow up for COPD    History of Present Illness    Ms. Reyes is a 68yo F who is followed for COPD and an abnormal CT scan of the chest. She was last seen in clinic on 12/8/22.    She returns to clinic today for follow up.     She has not had any exacerbations since her last visit. She is having trouble with a sinus infection again.     The following portions of the patient's history were reviewed and updated as appropriate: allergies, current medications, past family history, past medical history, past social history, past surgical history and problem list.    Review of Systems   Constitutional: Negative.    HENT: Positive for rhinorrhea, sinus pressure and sinus pain.    Eyes: Negative.    Respiratory: Positive for cough.    Cardiovascular: Negative.    Gastrointestinal: Negative.    Endocrine: Negative.    Genitourinary: Negative.    Musculoskeletal: Negative.    Skin: Negative.    Allergic/Immunologic: Negative.    Neurological: Negative.    Hematological: Negative.    Psychiatric/Behavioral: Negative.           Objective   Blood pressure 144/54, pulse 70, temperature 97.5 °F (36.4 °C), temperature source Infrared, resp. rate 14, height 167.6 cm (66\"), weight 97.9 kg (215 lb 12.8 oz), SpO2 96 %, not currently breastfeeding.  Physical Exam  Vitals and nursing note reviewed.   Constitutional:       General: She is not in acute distress.     Appearance: She is well-developed.   HENT:      Head: Normocephalic and atraumatic.   Eyes:      General: No scleral icterus.     Conjunctiva/sclera: Conjunctivae normal.      Pupils: Pupils are equal, round, and reactive to light.   Neck:      Thyroid: No thyromegaly.      Trachea: No tracheal deviation.   Cardiovascular:      Rate and Rhythm: Normal rate and regular rhythm.      Heart sounds: Normal heart sounds.   Pulmonary:      " Effort: Pulmonary effort is normal. No respiratory distress.      Breath sounds: Normal breath sounds.   Abdominal:      General: Bowel sounds are normal.      Palpations: Abdomen is soft.      Tenderness: There is no abdominal tenderness.   Musculoskeletal:         General: Normal range of motion.      Cervical back: Normal range of motion and neck supple.   Lymphadenopathy:      Cervical: No cervical adenopathy.   Skin:     General: Skin is warm and dry.      Findings: No erythema or rash.   Neurological:      Mental Status: She is alert and oriented to person, place, and time.      Motor: No abnormal muscle tone.      Coordination: Coordination normal.   Psychiatric:         Speech: Speech normal.         Behavior: Behavior normal.         Judgment: Judgment normal.         PFTs:  No new PFTs.    Imaging:  CT Chest from 1/19/23 reviewed. There is a new vague 1.7cm subpleural groundglass density in the superior right lower lobe. There is a new subcentimeter groundglass nodule in the central left upper lobe. The previously described tubular density in the left upper lobe has resolved. There is extensive mid and upper lobe predominant micronodularity and tree-in-bud clusters present since 2021 which are stable.     Assessment & Plan   Diagnoses and all orders for this visit:    1. Incidental lung nodule, greater than or equal to 8mm (Primary)  -     CT Chest Without Contrast; Future    2. Stage 2 moderate COPD by GOLD classification (HCC)    3. Non-seasonal allergic rhinitis, unspecified trigger    Other orders  -     amoxicillin-clavulanate (Augmentin) 875-125 MG per tablet; Take 1 tablet by mouth 2 (Two) Times a Day.  Dispense: 20 tablet; Refill: 0        Discussion:  Ms. Reyes is a 66yo F who is followed for COPD and Pulmonary Nodules     1. Moderate COPD  - Continue Breztri.   - Continue Albuterol as needed.   - Treat with Augmentin for an episode of sinusitis. Will hold off on steroids at this time. Instructed  to call if she thinks that she needs any steroids.      2. Abnormal CT Chest  - Tree-in-bud nodularity first noticed in May 2021.   - Most recent CT chest with stable nodularity and a new right lower lobe groundglass nodule measuring 1.7cm  - She will need a repeat CT chest in 6 months for follow up.   - I have asked her to let us know if she develops any systemic symptoms of MAC infection and we discussed these in clinic as we would need to perform a bronchoscopy at that time for evaluation.      3. Allergic Rhinitis/Sinusitis  - Continue OTC antihistamine   - Continue Mucinex.   - Nasal sinus rinses instead.   - Treat with a course of Augmentin as above.      4. GERD  - Elevate the head of the bed.   - Do not eat within 2-3 hours of bedtime when possible.     Follow up in 6 months after repeat CT chest.          Aria Irwin, DO  Pulmonary and Critical Care Medicine  Note Electronically Signed

## 2023-04-07 ENCOUNTER — TELEPHONE (OUTPATIENT)
Dept: INTERNAL MEDICINE | Facility: CLINIC | Age: 68
End: 2023-04-07
Payer: COMMERCIAL

## 2023-04-07 ENCOUNTER — TELEPHONE (OUTPATIENT)
Dept: PULMONOLOGY | Facility: CLINIC | Age: 68
End: 2023-04-07
Payer: COMMERCIAL

## 2023-04-07 ENCOUNTER — OFFICE VISIT (OUTPATIENT)
Dept: INTERNAL MEDICINE | Facility: CLINIC | Age: 68
End: 2023-04-07
Payer: COMMERCIAL

## 2023-04-07 VITALS
HEIGHT: 66 IN | DIASTOLIC BLOOD PRESSURE: 58 MMHG | WEIGHT: 211.8 LBS | BODY MASS INDEX: 34.04 KG/M2 | SYSTOLIC BLOOD PRESSURE: 116 MMHG | TEMPERATURE: 97.8 F | HEART RATE: 76 BPM

## 2023-04-07 DIAGNOSIS — J01.01 ACUTE RECURRENT MAXILLARY SINUSITIS: Primary | ICD-10-CM

## 2023-04-07 RX ORDER — AMOXICILLIN AND CLAVULANATE POTASSIUM 875; 125 MG/1; MG/1
1 TABLET, FILM COATED ORAL 2 TIMES DAILY
COMMUNITY

## 2023-04-07 RX ORDER — TRIAMCINOLONE ACETONIDE 40 MG/ML
80 INJECTION, SUSPENSION INTRA-ARTICULAR; INTRAMUSCULAR ONCE
Status: COMPLETED | OUTPATIENT
Start: 2023-04-07 | End: 2023-04-07

## 2023-04-07 RX ADMIN — TRIAMCINOLONE ACETONIDE 80 MG: 40 INJECTION, SUSPENSION INTRA-ARTICULAR; INTRAMUSCULAR at 15:43

## 2023-04-07 NOTE — TELEPHONE ENCOUNTER
PATIENT REQUESTING CALL BACK. SHE HAS COUGH, CONGESTION AND SEVERE HEADACHE. SHE STATES SHE WAS PRESCRIBED AMOXICILLIN 875 MG ON 02/27/2023 BY DR. MORALES HER PULMONOLOGIST FOR SIMILAR SYMPTOMS. SHE STATES SHE WILL BE TAKING 3RD DOSE TODAY. SHE STATES SHE ISN'T FEELING ANY BETTER. PLEASE CALL TO ADVICE.

## 2023-04-07 NOTE — PROGRESS NOTES
[facility]     Agustina Reyes  1955   [id number]      Patient Care Team:  Rich Zaragoza MD as PCP - General (Internal Medicine)  Rich Zaragoza MD as PCP - Internal Medicine (Internal Medicine)  China Ivey APRN as Referring Physician (Neurology)  Paco Bernal MD as Consulting Physician (Cardiology)  Eusebia Gomes APRN as Nurse Practitioner (Pulmonary Disease)  Aria Irwin DO as Consulting Physician (Pulmonary Disease)    Chief Complaint::   Chief Complaint   Patient presents with   • Cough     X 8 days - negative covid test Tues   • Headache   • congestion        HPI  The patient comes in with an 8-day history of cough, headache, and congestion.    Acute maxillary sinusitis  The patient confirms that she has been sick for several days now and has taken Augmentin last 02/2023. She initially experienced a sore throat for approximately 3 to 4 days and believes that it was accompanied by a low-grade fever. She has taken Tylenol. She has then been excessively sneezing and with a persistent cough. She tested negative for COVID-19 on 04/04/2023. She feels that her current symptoms are similar to when she had a COVID-19 infection. She experiences extreme lethargy. She was off from work from 04/03/2023 until 04/06/2023 but notes no improvement in her symptoms. She started taking an antibiotic on 04/06/2023 which was prescribed by Dr. Aria Irwin. She began experiencing headaches on top of her head on the night of 04/06/2023. Her headaches worsen when she bends over, coughs, or sneezes. She only experienced shortness of breath when she walked to this clinic today. She believes she has sinusitis. The onset of her symptoms started on 03/30/2023. She had myalgia. She has tenderness in her neck. She has a hoarse voice. She attests that she had the same recurring symptoms on 02/2023, 03/2023, and 04/2023. She usually gets watery eyes from allergies. 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   • History of CVA (cerebrovascular accident)   • Prediabetes   • Stage 2 moderate COPD by GOLD classification   • Abnormal CT of the chest   • Non-seasonal allergic rhinitis   • Gastroesophageal reflux disease with esophagitis without hemorrhage   • Other acute sinusitis   • Incidental lung nodule, greater than or equal to 8mm        Past Medical History:   Diagnosis Date   • COPD (chronic obstructive pulmonary disease) 2020   • Hyperlipidemia    • Hypertension    • Migraines    • Occipital neuralgia of left side    • Shingles    • Stroke        Past Surgical History:   Procedure Laterality Date   • CATARACT EXTRACTION     • COLONOSCOPY     • HYSTERECTOMY         Family History   Problem Relation Age of Onset   • Heart disease Mother    • Stroke Mother    • Hypertension Mother    • Heart disease Father    • Hypertension Father    • Stroke Maternal Grandfather    • No Known Problems Sister    • Breast cancer Neg Hx    • Ovarian cancer Neg Hx        Social History     Socioeconomic History   • Marital status: Single   Tobacco Use   • Smoking status: Former     Packs/day: 1.00     Years: 40.00     Pack years: 40.00     Types: Cigarettes     Quit date: 2014     Years since quittin.6   • Smokeless tobacco: Never   Vaping Use   • Vaping Use: Never used   Substance and Sexual Activity   • Alcohol use: Yes     Alcohol/week: 0.0 standard drinks     Comment: occ.   • Drug use: No   • Sexual activity: Defer       Allergies   Allergen Reactions   • Erythromycin GI Intolerance     Nausea           Current Outpatient Medications:   •  albuterol sulfate   (90 Base) MCG/ACT inhaler, Inhale 2 puffs Every 4 (Four) Hours As Needed for Wheezing., Disp: 18 g, Rfl: 5  •  amoxicillin-clavulanate (AUGMENTIN) 875-125 MG per tablet, Take 1 tablet by mouth 2 (Two) Times a Day. Started rx yesterday, Disp: , Rfl:   •  aspirin 81 MG tablet, Take 1 tablet by mouth Daily., Disp: , Rfl:   •  atorvastatin (LIPITOR) 40 MG tablet, Take 1 tablet by mouth Daily., Disp: 90 tablet, Rfl: 3  •  Budeson-Glycopyrrol-Formoterol (Breztri Aerosphere) 160-9-4.8 MCG/ACT aerosol inhaler, Inhale 2 puffs 2 (Two) Times a Day., Disp: 3 each, Rfl: 3  •  butalbital-acetaminophen  MG tablet tablet, Take 1-2 tablets by mouth Every 6 (Six) Hours As Needed (migraines)., Disp: 15 each, Rfl: 0  •  chlorthalidone (HYGROTON) 25 MG tablet, Take 1 tablet by mouth Daily., Disp: 90 tablet, Rfl: 3  •  clopidogrel (PLAVIX) 75 MG tablet, Take 1 tablet by mouth Daily., Disp: 90 tablet, Rfl: 3  •  losartan (COZAAR) 100 MG tablet, TAKE 1 TABLET BY MOUTH DAILY, Disp: 90 tablet, Rfl: 3  •  prochlorperazine (COMPAZINE) 5 MG tablet, Take 1-2 tablets by mouth Every 6 (Six) Hours As Needed for Nausea or Vomiting (migraine)., Disp: 20 tablet, Rfl: 5  •  verapamil SR (CALAN-SR) 240 MG CR tablet, Take 1 tablet by mouth Every Night., Disp: 90 tablet, Rfl: 3  •  amoxicillin-clavulanate (Augmentin) 875-125 MG per tablet, Take 1 tablet by mouth 2 (Two) Times a Day. (Patient not taking: Reported on 4/7/2023), Disp: 20 tablet, Rfl: 0    Review of Systems   Constitutional: Negative for chills, fatigue and fever.   HENT: Negative for congestion, ear pain and sinus pressure.    Respiratory: Negative for cough, chest tightness, shortness of breath and wheezing.    Cardiovascular: Negative for chest pain and palpitations.   Gastrointestinal: Negative for abdominal pain, blood in stool and constipation.   Skin: Negative for color change.   Allergic/Immunologic: Negative for environmental allergies.   Neurological: Negative for dizziness,  "speech difficulty and headache.   Psychiatric/Behavioral: Negative for decreased concentration. The patient is not nervous/anxious.         Vital Signs  Vitals:    04/07/23 1512   BP: 116/58   BP Location: Left arm   Patient Position: Sitting   Cuff Size: Large Adult   Pulse: 76   Temp: 97.8 °F (36.6 °C)   Weight: 96.1 kg (211 lb 12.8 oz)   Height: 167.6 cm (65.98\")   PainSc:   8   PainLoc: Head       Physical Exam  Vitals reviewed.   Constitutional:       Appearance: Normal appearance. She is well-developed.   HENT:      Head: Normocephalic and atraumatic.      Right Ear: Hearing, tympanic membrane, ear canal and external ear normal.      Left Ear: Hearing, tympanic membrane, ear canal and external ear normal.      Nose: Nose normal.      Comments: She has tenderness over maxillary sinuses bilaterally.      Mouth/Throat:      Pharynx: Uvula midline.      Comments: There is mild posterior pharyngeal erythema.   Eyes:      General: Lids are normal.      Conjunctiva/sclera: Conjunctivae normal.      Pupils: Pupils are equal, round, and reactive to light.   Cardiovascular:      Rate and Rhythm: Normal rate and regular rhythm.      Heart sounds: Normal heart sounds.   Pulmonary:      Effort: Pulmonary effort is normal.      Breath sounds: Normal breath sounds.   Musculoskeletal:      Cervical back: Full passive range of motion without pain, normal range of motion and neck supple.   Skin:     General: Skin is warm and dry.   Neurological:      Mental Status: She is alert and oriented to person, place, and time.      Deep Tendon Reflexes: Reflexes are normal and symmetric.   Psychiatric:         Speech: Speech normal.            Procedures    ACE III MINI             Assessment/Plan:    Diagnoses and all orders for this visit:    1. Acute recurrent maxillary sinusitis (Primary)  -     triamcinolone acetonide (KENALOG-40) injection 80 mg        1. Acute maxillary sinusitis  She has already started Augmentin. She is given " parental triamcinolone and will complete Augmentin. She had a sinus infection in 02/2023. If this recurs, she may need an ENT workup.    Plan of care reviewed with patient at the conclusion of today's visit. Education was provided regarding diagnosis, management, and any prescribed or recommended OTC medications.Patient verbalizes understanding of and agreement with management plan.       Rich Zaragoza MD     Transcribed from ambient dictation for Rich Zaragoza MD by Brooklynn Boggs.  04/07/23   17:17 EDT    Patient or patient representative verbalized consent to the visit recording.  I have personally performed the services described in this document as transcribed by the above individual, and it is both accurate and complete.

## 2023-04-07 NOTE — TELEPHONE ENCOUNTER
Pt called c/o possible cold or flu like  Symptoms, advised pt to go be evaluated at nearest UC to r/o flu or covid and receive treatment. Pt verbalized understanding

## 2023-05-08 RX ORDER — LOSARTAN POTASSIUM 100 MG/1
100 TABLET ORAL DAILY
Qty: 90 TABLET | Refills: 3 | Status: SHIPPED | OUTPATIENT
Start: 2023-05-08

## 2023-06-15 ENCOUNTER — OFFICE VISIT (OUTPATIENT)
Dept: INTERNAL MEDICINE | Facility: CLINIC | Age: 68
End: 2023-06-15
Payer: COMMERCIAL

## 2023-06-15 VITALS
HEART RATE: 72 BPM | TEMPERATURE: 98.4 F | BODY MASS INDEX: 33.06 KG/M2 | WEIGHT: 210.6 LBS | DIASTOLIC BLOOD PRESSURE: 62 MMHG | SYSTOLIC BLOOD PRESSURE: 140 MMHG | HEIGHT: 67 IN

## 2023-06-15 DIAGNOSIS — Z12.31 ENCOUNTER FOR SCREENING MAMMOGRAM FOR MALIGNANT NEOPLASM OF BREAST: ICD-10-CM

## 2023-06-15 DIAGNOSIS — R73.03 PREDIABETES: ICD-10-CM

## 2023-06-15 DIAGNOSIS — G43.109 MIGRAINE WITH AURA AND WITHOUT STATUS MIGRAINOSUS, NOT INTRACTABLE: ICD-10-CM

## 2023-06-15 DIAGNOSIS — Z86.73 HISTORY OF CVA (CEREBROVASCULAR ACCIDENT): ICD-10-CM

## 2023-06-15 DIAGNOSIS — I10 PRIMARY HYPERTENSION: ICD-10-CM

## 2023-06-15 DIAGNOSIS — M70.62 TROCHANTERIC BURSITIS OF BOTH HIPS: ICD-10-CM

## 2023-06-15 DIAGNOSIS — Z00.00 PREVENTATIVE HEALTH CARE: Primary | ICD-10-CM

## 2023-06-15 DIAGNOSIS — E78.2 MIXED HYPERLIPIDEMIA: ICD-10-CM

## 2023-06-15 DIAGNOSIS — M70.61 TROCHANTERIC BURSITIS OF BOTH HIPS: ICD-10-CM

## 2023-06-15 DIAGNOSIS — J44.9 STAGE 2 MODERATE COPD BY GOLD CLASSIFICATION: ICD-10-CM

## 2023-06-15 RX ORDER — NYSTATIN 100000 U/G
1 CREAM TOPICAL 2 TIMES DAILY
Qty: 30 G | Refills: 0 | Status: SHIPPED | OUTPATIENT
Start: 2023-06-15

## 2023-06-15 RX ADMIN — TRIAMCINOLONE ACETONIDE 40 MG: 40 INJECTION, SUSPENSION INTRA-ARTICULAR; INTRAMUSCULAR at 10:15

## 2023-06-15 RX ADMIN — TRIAMCINOLONE ACETONIDE 40 MG: 40 INJECTION, SUSPENSION INTRA-ARTICULAR; INTRAMUSCULAR at 10:12

## 2023-06-15 NOTE — PROGRESS NOTES
"[facility]     Agustina Reyes  1955   [id number]      Patient Care Team:  Rich Zaragoza MD as PCP - General (Internal Medicine)  Rich Zaragoza MD as PCP - Internal Medicine (Internal Medicine)  China Ivey APRN as Referring Physician (Neurology)  Paco Bernal MD as Consulting Physician (Cardiology)  Eusebia Gomes APRN as Nurse Practitioner (Pulmonary Disease)  Case, Aria FARAH DO as Consulting Physician (Pulmonary Disease)    Chief Complaint::   Chief Complaint   Patient presents with   • Annual Exam        HPI  The patient comes in for annual examination and follow-up of hypertension, hyperlipidemia, prediabetes, and migraine.    Intertriginous candidiasis under the left breast  The patient states that she has had a rash underneath her left breast for at least 4 months. When she works, it is so hot that she was treating it like prickly heat. When she was a child, she used hydrocortisone and sometimes she puts powder on. She started thinking it was shingles again. She has had the 2 doses of the Shingrix vaccine.    Sinus infection  The patient was last seen in 03/2023 for a sinus infection. When she gets up, it feels like someone is \"pounding\" in her ears. She states that it does get better. She tries to get up slow. She feels her ears that pounding on both sides of her head. If she swallows, it does not help. It just goes away. She has constant allergy symptoms. She has a stuffy nose. She has postnasal drip.    Leg cramps  The patient has at least 2 to 3 nighttime leg cramps. She is starting to get hand cramps during the day. If she is active, she is not just sitting here. She has done a magnesium supplement at night, but there was not any difference. If she drinks a Gatorade Zero, she does not have them. She drinks pickle juice. She will go right back to sleep and then she will have them again. She is standing on her feet for 9 hours. She is on " atorvastatin. She states that tonight before she goes to bed, she is going to take Tylenol because her legs are going to cramp. She has noticed that after heavy leg workouts, whether it is doing squats, lunges, or running, her legs will cramp at night. If she does not take Tylenol, occasionally she takes Advil. She is looking for a pattern. She can walk all day all over the store and no issues. If she goes to take a walk just to walk around the neighborhood, she gets these cramps right in the front of her legs. She has asked her cardiologist if she has peripheral artery disease. She states that he told her that he hears a heartbeat. She is on Lipitor because she had a stroke. She is on Plavix because she has had a stroke. She has been on Plavix for years. She has had 3 nosebleeds in the last 3 months. She has never mind had nosebleeds. She had her stroke in 2014.    Bilateral trochanteric bursitis  The patient is having issues not standing, not softly walking. When she starts to walk the neighborhood, she has pain. She has gained a lot of weight. She can not take ibuprofen.      Chronic Conditions:  Hypertension, hyperlipidemia, prediabetes, migraine.      Patient Active Problem List   Diagnosis   • Occipital neuralgia of left side   • Hypertension   • Migraine with aura and without status migrainosus, not intractable   • Hyperlipidemia   • Occlusion and stenosis of left posterior cerebral artery   • History of CVA (cerebrovascular accident)   • Prediabetes   • Stage 2 moderate COPD by GOLD classification   • Abnormal CT of the chest   • Non-seasonal allergic rhinitis   • Gastroesophageal reflux disease with esophagitis without hemorrhage   • Other acute sinusitis   • Incidental lung nodule, greater than or equal to 8mm        Past Medical History:   Diagnosis Date   • COPD (chronic obstructive pulmonary disease) May2020   • Hyperlipidemia    • Hypertension    • Migraines    • Occipital neuralgia of left side    •  Pneumonia    • Shingles    • Stroke        Past Surgical History:   Procedure Laterality Date   • CATARACT EXTRACTION     • COLONOSCOPY     • HYSTERECTOMY     • SUBTOTAL HYSTERECTOMY         Family History   Problem Relation Age of Onset   • Heart disease Mother    • Stroke Mother    • Hypertension Mother    • Heart disease Father    • Hypertension Father    • Stroke Maternal Grandfather    • No Known Problems Sister    • Breast cancer Neg Hx    • Ovarian cancer Neg Hx        Social History     Socioeconomic History   • Marital status: Single   Tobacco Use   • Smoking status: Former     Packs/day: 1.00     Years: 40.00     Pack years: 40.00     Types: Cigarettes     Quit date: 2014     Years since quittin.8   • Smokeless tobacco: Never   Vaping Use   • Vaping Use: Never used   Substance and Sexual Activity   • Alcohol use: Yes     Comment: occ.   • Drug use: No   • Sexual activity: Defer       Allergies   Allergen Reactions   • Erythromycin GI Intolerance     Nausea           Current Outpatient Medications:   •  albuterol sulfate  (90 Base) MCG/ACT inhaler, Inhale 2 puffs Every 4 (Four) Hours As Needed for Wheezing., Disp: 18 g, Rfl: 5  •  aspirin 81 MG tablet, Take 1 tablet by mouth Daily., Disp: , Rfl:   •  atorvastatin (LIPITOR) 40 MG tablet, Take 1 tablet by mouth Daily., Disp: 90 tablet, Rfl: 3  •  Budeson-Glycopyrrol-Formoterol (Breztri Aerosphere) 160-9-4.8 MCG/ACT aerosol inhaler, Inhale 2 puffs 2 (Two) Times a Day., Disp: 3 each, Rfl: 3  •  butalbital-acetaminophen  MG tablet tablet, Take 1-2 tablets by mouth Every 6 (Six) Hours As Needed (migraines)., Disp: 15 each, Rfl: 0  •  chlorthalidone (HYGROTON) 25 MG tablet, Take 1 tablet by mouth Daily., Disp: 90 tablet, Rfl: 3  •  losartan (COZAAR) 100 MG tablet, Take 1 tablet by mouth Daily., Disp: 90 tablet, Rfl: 3  •  prochlorperazine (COMPAZINE) 5 MG tablet, Take 1-2 tablets by mouth Every 6 (Six) Hours As Needed for Nausea or  "Vomiting (migraine)., Disp: 20 tablet, Rfl: 5  •  verapamil SR (CALAN-SR) 240 MG CR tablet, Take 1 tablet by mouth Every Night., Disp: 90 tablet, Rfl: 3  •  nystatin (MYCOSTATIN) 497614 UNIT/GM cream, Apply 1 application topically to the appropriate area as directed 2 (Two) Times a Day., Disp: 30 g, Rfl: 0    Immunization History   Administered Date(s) Administered   • COVID-19 (PFIZER) Purple Cap Monovalent 02/15/2021, 03/09/2021, 03/27/2021, 11/22/2021   • Fluzone Quad >6mos (Multi-dose) 03/10/2019, 11/22/2021   • Influenza Quad Vaccine (Inpatient) 03/10/2019, 08/28/2020   • Shingrix 12/15/2019, 02/09/2020        Health Maintenance Due   Topic Date Due   • DXA SCAN  Never done   • Pneumococcal Vaccine 65+ (1 - PCV) Never done   • TDAP/TD VACCINES (1 - Tdap) Never done   • HEPATITIS C SCREENING  Never done   • ANNUAL PHYSICAL  Never done   • PAP SMEAR  Never done   • MAMMOGRAM  07/30/2021   • COVID-19 Vaccine (5 - Pfizer series) 01/17/2022        Review of Systems   Review of systems is otherwise unremarkable.    Vital Signs  Vitals:    06/15/23 1410   BP: 140/62   BP Location: Left arm   Patient Position: Sitting   Cuff Size: Adult   Pulse: 72   Temp: 98.4 °F (36.9 °C)   Weight: 95.5 kg (210 lb 9.6 oz)   Height: 169 cm (66.54\")   PainSc:   7   PainLoc: Hip  Comment: bilateral       Physical Exam  Vitals and nursing note reviewed.   Constitutional:       Appearance: She is well-developed. She is obese.   HENT:      Head: Normocephalic and atraumatic.      Right Ear: External ear normal.      Left Ear: External ear normal.      Nose: Nose normal.      Mouth/Throat:      Pharynx: No oropharyngeal exudate.   Eyes:      Conjunctiva/sclera: Conjunctivae normal.      Pupils: Pupils are equal, round, and reactive to light.   Neck:      Thyroid: No thyromegaly.      Vascular: No JVD.   Cardiovascular:      Rate and Rhythm: Normal rate and regular rhythm.      Heart sounds: Normal heart sounds. No murmur heard.    No " friction rub. No gallop.   Pulmonary:      Effort: Pulmonary effort is normal. No respiratory distress.      Breath sounds: Normal breath sounds. No wheezing or rales.   Chest:      Chest wall: No tenderness.   Abdominal:      General: Bowel sounds are normal. There is no distension.      Palpations: Abdomen is soft. There is no mass.      Tenderness: There is no abdominal tenderness. There is no guarding or rebound.      Hernia: No hernia is present.   Musculoskeletal:         General: No tenderness. Normal range of motion.      Cervical back: Normal range of motion and neck supple.      Comments: There is tenderness to palpation over the left and right trochanteric bursae.    Lymphadenopathy:      Cervical: No cervical adenopathy.   Skin:     General: Skin is warm and dry.      Findings: No erythema or rash.      Comments: There is an erythematous rash under the left breast.   Neurological:      Mental Status: She is alert and oriented to person, place, and time.      Cranial Nerves: No cranial nerve deficit.      Sensory: No sensory deficit.      Motor: No abnormal muscle tone.      Coordination: Coordination normal.      Deep Tendon Reflexes: Reflexes normal.   Psychiatric:         Behavior: Behavior normal.         Thought Content: Thought content normal.         Judgment: Judgment normal.       There is point tenderness above supratrochanteric bursa bilaterally. She has spider veins present on both ankle areas, but exam is otherwise normal.    Procedures     Bursa Injection:  Area of right trochanteric bursa cleansed with alcohol wipe.  1 ml (40 mg) triamcinolone, 1 ml lidocaine injected into bursa without problem.      Procedure was repeated on the left.      Fall Risk Screen:  [facility] Fall Risk Assessment was completed, and patient is at LOW risk for falls.Assessment completed on:6/15/2023    Health Habits and Functional and Cognitive Screening:       No data to display                Depression  [place]  PHQ-9 Total Score: 0     ACE III MINI             Assessment/Plan:    1. Prevention  - Overall, she remains in good health. She is overdue for mammography. She is up to date on colorectal cancer screening and fully vaccinated against COVID-19.    2. Hypertension  - Blood pressure has generally been well controlled on chlorthalidone, losartan, and verapamil.    3. Hyperlipidemia  - Lipid panel is pending. Lipid panel is well controlled on atorvastatin. However, she is having mild cramps in her hands and legs. Legs are at night. This is not necessarily consistent with statin myalgias, but suggested she add CoQ10. If after a month that it has not helped, we could consider changing atorvastatin to rosuvastatin.    4. Prediabetes  - Hemoglobin A1c was controlled at 6.1 percent. The treatment remains healthy diet and avoidance of weight gain.    5. History of CVA  - She has now been on dual antiplatelet therapy for nearly 10 years since her stroke. I think we can safely discontinue clopidogrel and continue aspirin.    6. COPD  - Per pulmonary.    7. Bilateral trochanteric bursitis  - Both bursa are injected with triamcinolone and lidocaine.    8. Intertriginous candidiasis under the left breast  - She was given nystatin cream.    9. Anterior lower leg pain  - When walking, this is more likely shin splints than pad. She has good peripheral pulses. Suggested she make sure she has shoes that have proper support when she walks.    Follow up in 1 year.    BMI is >= 30 and <35. (Class 1 Obesity). The following options were offered after discussion;: exercise counseling/recommendations and nutrition counseling/recommendations    Diagnoses and all orders for this visit:    1. Preventative health care (Primary)    2. Primary hypertension    3. Mixed hyperlipidemia    4. Prediabetes    5. Migraine with aura and without status migrainosus, not intractable    6. History of CVA (cerebrovascular accident)    7. Stage 2  moderate COPD by GOLD classification    8. Encounter for screening mammogram for malignant neoplasm of breast  -     Mammo Screening Digital Tomosynthesis Bilateral With CAD; Future    9. Trochanteric bursitis of both hips  -     triamcinolone acetonide (KENALOG-40) injection 40 mg  -     triamcinolone acetonide (KENALOG-40) injection 40 mg    Other orders  -     nystatin (MYCOSTATIN) 107257 UNIT/GM cream; Apply 1 application topically to the appropriate area as directed 2 (Two) Times a Day.  Dispense: 30 g; Refill: 0            Labs  Results for orders placed or performed in visit on 01/23/23   Comprehensive Metabolic Panel    Specimen: Blood   Result Value Ref Range    Glucose 104 (H) 65 - 99 mg/dL    BUN 15 8 - 23 mg/dL    Creatinine 0.94 0.57 - 1.00 mg/dL    Sodium 144 136 - 145 mmol/L    Potassium 3.9 3.5 - 5.2 mmol/L    Chloride 104 98 - 107 mmol/L    CO2 26.8 22.0 - 29.0 mmol/L    Calcium 9.4 8.6 - 10.5 mg/dL    Total Protein 6.8 6.0 - 8.5 g/dL    Albumin 4.3 3.5 - 5.2 g/dL    ALT (SGPT) 13 1 - 33 U/L    AST (SGOT) 8 1 - 32 U/L    Alkaline Phosphatase 72 39 - 117 U/L    Total Bilirubin 0.2 0.0 - 1.2 mg/dL    Globulin 2.5 gm/dL    A/G Ratio 1.7 g/dL    BUN/Creatinine Ratio 16.0 7.0 - 25.0    Anion Gap 13.2 5.0 - 15.0 mmol/L    eGFR 66.6 >60.0 mL/min/1.73   Thyroid Antibodies    Specimen: Blood   Result Value Ref Range    Thyroid Peroxidase Antibody <9 0 - 34 IU/mL    Thyroglobulin Ab <1.0 0.0 - 0.9 IU/mL   Lipid Panel    Specimen: Blood   Result Value Ref Range    Total Cholesterol 127 0 - 200 mg/dL    Triglycerides 77 0 - 150 mg/dL    HDL Cholesterol 49 40 - 60 mg/dL    LDL Cholesterol  63 0 - 100 mg/dL    VLDL Cholesterol 15 5 - 40 mg/dL    LDL/HDL Ratio 1.28    Hemoglobin A1c    Specimen: Blood   Result Value Ref Range    Hemoglobin A1C 6.10 (H) 4.80 - 5.60 %        Counseling was given to patient for the following topics: appropriate exercise 150 minutes per week, disease prevention and healthy eating  habits.    Plan of care reviewed with patient at the conclusion of today's visit. Education was provided regarding diagnosis, management, and any prescribed or recommended OTC medications.Patient verbalizes understanding of and agreement with management plan.         Rich Zaragoza MD       Transcribed from ambient dictation for Rich Zaragoza MD by Mayra Boyd.  06/15/23   16:28 EDT    Patient or patient representative verbalized consent to the visit recording.  I have personally performed the services described in this document as transcribed by the above individual, and it is both accurate and complete.

## 2023-06-16 RX ORDER — TRIAMCINOLONE ACETONIDE 40 MG/ML
40 INJECTION, SUSPENSION INTRA-ARTICULAR; INTRAMUSCULAR ONCE
Status: COMPLETED | OUTPATIENT
Start: 2023-06-16 | End: 2023-06-15

## 2023-08-24 ENCOUNTER — READMISSION MANAGEMENT (OUTPATIENT)
Dept: CALL CENTER | Facility: HOSPITAL | Age: 68
End: 2023-08-24
Payer: COMMERCIAL

## 2023-08-24 NOTE — OUTREACH NOTE
Prep Survey      Flowsheet Row Responses   [facility] facility patient discharged from? Non-[facility]   Is LACE score < 7 ? Non-BH Discharge   Eligibility [facility]   Date of Admission 08/17/23   Date of Discharge 08/23/23   Discharge diagnosis open L calcaneus fx. OR on 8/18 for ORIF with I&D. OR on 8/21 for I&D and wound vac placement.   Does the patient have one of the following disease processes/diagnoses(primary or secondary)? General Surgery   Prep survey completed? Yes            Lupe Connolly Registered Nurse

## 2023-08-25 ENCOUNTER — TRANSITIONAL CARE MANAGEMENT TELEPHONE ENCOUNTER (OUTPATIENT)
Dept: CALL CENTER | Facility: HOSPITAL | Age: 68
End: 2023-08-25
Payer: COMMERCIAL

## 2023-08-25 NOTE — OUTREACH NOTE
Call Center TCM Note      Flowsheet Row Responses   [facility] patient discharged from? Non-   Does the patient have one of the following disease processes/diagnoses(primary or secondary)? General Surgery   TCM attempt successful? Yes   Call start time 1150   Call end time 1201   Discharge diagnosis open L calcaneus fx. OR on 8/18 for ORIF with I&D. OR on 8/21 for I&D and wound vac placement.   Person spoke with today (if not patient) and relationship patient   Meds reviewed with patient/caregiver? Yes   Is the patient having any side effects they believe may be caused by any medication additions or changes? No   Does the patient have all medications ordered at discharge? Yes   Is the patient taking all medications as directed (includes completed medication regime)? Yes   Comments n   Does the patient have an appointment with their PCP within 7-14 days of discharge? No   Nursing Interventions Patient desires to follow up with specialty only   Psychosocial issues? No   Did the patient receive a copy of their discharge instructions? Yes   Nursing interventions Reviewed instructions with patient   What is the patient's perception of their health status since discharge? Improving   Is the patient/caregiver able to teach back signs and symptoms related to disease process for when to call PCP? Yes   Is the patient/caregiver able to teach back signs and symptoms related to disease process for when to call 911? Yes   Is the patient/caregiver able to teach back the hierarchy of who to call/visit for symptoms/problems? PCP, Specialist, Home health nurse, Urgent Care, ED, 911 Yes   If the patient is a current smoker, are they able to teach back resources for cessation? Not a smoker   TCM call completed? Yes   Wrap up additional comments Patient very appreciative of call. She is having some edema to legs, but her Chlorthalidone was held in the hospital and she has restarted her med since discharge. If swelling does not  improve, with medication and elevation she will call office next week.   Call end time 1201   Would this patient benefit from a Referral to [facility] Social Work? No   Is the patient interested in additional calls from an ambulatory ? No            Jason Chan RN    8/25/2023, 12:02 EDT

## 2024-01-09 DIAGNOSIS — J44.9 STAGE 2 MODERATE COPD BY GOLD CLASSIFICATION: ICD-10-CM

## 2024-01-09 RX ORDER — BUDESONIDE, GLYCOPYRROLATE, AND FORMOTEROL FUMARATE 160; 9; 4.8 UG/1; UG/1; UG/1
2 AEROSOL, METERED RESPIRATORY (INHALATION) 2 TIMES DAILY
Qty: 32.1 G | Refills: 1 | Status: SHIPPED | OUTPATIENT
Start: 2024-01-09

## 2024-01-24 ENCOUNTER — OFFICE VISIT (OUTPATIENT)
Dept: CARDIOLOGY | Facility: CLINIC | Age: 69
End: 2024-01-24
Payer: COMMERCIAL

## 2024-01-24 VITALS
WEIGHT: 208 LBS | OXYGEN SATURATION: 97 % | HEIGHT: 67 IN | HEART RATE: 85 BPM | SYSTOLIC BLOOD PRESSURE: 124 MMHG | DIASTOLIC BLOOD PRESSURE: 58 MMHG | BODY MASS INDEX: 32.65 KG/M2

## 2024-01-24 DIAGNOSIS — E78.2 MIXED HYPERLIPIDEMIA: ICD-10-CM

## 2024-01-24 DIAGNOSIS — I25.10 CORONARY ARTERY DISEASE INVOLVING NATIVE CORONARY ARTERY OF NATIVE HEART WITHOUT ANGINA PECTORIS: Primary | ICD-10-CM

## 2024-01-24 DIAGNOSIS — I10 PRIMARY HYPERTENSION: ICD-10-CM

## 2024-01-24 PROCEDURE — 99214 OFFICE O/P EST MOD 30 MIN: CPT | Performed by: INTERNAL MEDICINE

## 2024-01-24 RX ORDER — METHOCARBAMOL 500 MG/1
TABLET, FILM COATED ORAL
COMMUNITY
Start: 2023-10-12 | End: 2024-01-24

## 2024-01-24 RX ORDER — ERGOCALCIFEROL 1.25 MG/1
50000 CAPSULE ORAL
COMMUNITY
End: 2024-01-24

## 2024-01-24 RX ORDER — GABAPENTIN 300 MG/1
300 CAPSULE ORAL 3 TIMES DAILY
COMMUNITY
Start: 2023-09-26 | End: 2024-01-24

## 2024-01-24 RX ORDER — ATORVASTATIN CALCIUM 40 MG/1
40 TABLET, FILM COATED ORAL DAILY
Qty: 90 TABLET | Refills: 3 | Status: SHIPPED | OUTPATIENT
Start: 2024-01-24

## 2024-01-24 NOTE — PROGRESS NOTES
[facility] Cardiology  Office Progress Note  Agustina Reyes  1955  [address] [place] [address]       Visit Date: 24    PCP: Rich Zaragoza MD  [address]  [place] [address]    IDENTIFICATION: A 68 y.o. female single [employer]  Previous [facility] patient    PROBLEM LIST:   CAD   CT chest coronary calcifications  Left occipital  CVA with a possible LPCA stenosis by MRA, 2014  MRI Brain  w/o contrast, 2014:  Showing scattered foci of acute ischemia on left occipital lobe.    MRA of neck with and without contrast:  Focal area of the P1 segment of left posterior cerebral artery.    Echocardiogram, 2014, LVEF 60-65%.  No  thrombus in left atrial appendage. No PFO. Prominent Chiari network noted in right atrium.    Carotid duplex, 2014:  Showing no significant carotid artery stenosis bilaterally.     Medtronic loop recorder implant, 2014 (battery life , 2018)  Transcranial  Doppler, 2014:  Showing normal flow and wave form seen in MELODIE, NCA, terminal ICA, PCA.  Hypertension.   Pre-Diabetes:   A1c 6.1   6.1  Exertional calf burning, possible claudification  Stage II COPD- Case        CT chest infectious tree/bud opacifications       CT chest - chronic tree/bud nodularity- favoring MAC  Cluster migranes   Hyperlipidemia   136/81/49/71   127/77/49/63  Former tobacco use  Shingles  COVID-19  outpatient antibiotic prednisone  Heel fracture/Achilles tear  surgeries [facility]      CC: Follow-up CAD    Allergies  Allergies   Allergen Reactions    Erythromycin GI Intolerance     Nausea      Amlodipine Other (See Comments)     Pt states she does not remember an allergy to this medicine, states her legs were swelling    Lisinopril Other (See Comments)     Joint pain    Sulfa Antibiotics Unknown (See Comments)     Pt's home pharmacy has sulfa drugs listed as an  "allergy.       Current Medications    Current Outpatient Medications:     albuterol sulfate  (90 Base) MCG/ACT inhaler, Inhale 2 puffs Every 4 (Four) Hours As Needed for Wheezing., Disp: 18 g, Rfl: 5    aspirin 81 MG tablet, Take 1 tablet by mouth Daily., Disp: , Rfl:     atorvastatin (LIPITOR) 40 MG tablet, Take 1 tablet by mouth Daily., Disp: 90 tablet, Rfl: 3    Budeson-Glycopyrrol-Formoterol (Breztri Aerosphere) 160-9-4.8 MCG/ACT aerosol inhaler, INHALE 2 PUFFS BY MOUTH TWICE DAILY, Disp: 32.1 g, Rfl: 1    butalbital-acetaminophen  MG tablet tablet, Take 1-2 tablets by mouth Every 6 (Six) Hours As Needed (migraines)., Disp: 15 each, Rfl: 0    chlorthalidone (HYGROTON) 25 MG tablet, Take 1 tablet by mouth Daily., Disp: 90 tablet, Rfl: 3    losartan (COZAAR) 100 MG tablet, Take 1 tablet by mouth Daily., Disp: 90 tablet, Rfl: 3    prochlorperazine (COMPAZINE) 5 MG tablet, Take 1-2 tablets by mouth Every 6 (Six) Hours As Needed for Nausea or Vomiting (migraine)., Disp: 20 tablet, Rfl: 5    verapamil SR (CALAN-SR) 240 MG CR tablet, Take 1 tablet by mouth Every Night. (Patient taking differently: Take 1 tablet by mouth Daily.), Disp: 90 tablet, Rfl: 3    vitamin D3 125 MCG (5000 UT) capsule capsule, Take 1 capsule by mouth Daily., Disp: , Rfl:       History of Present Illness   Agustina Reyes is a 68 y.o. year old female here for follow up.  Difficult last year with heel fracture and Achilles tear with nonweightbearing for several months.  This was all handled through [facility].  She notes no new cardiac symptoms.      OBJECTIVE:  Vitals:    01/24/24 1420   BP: 124/58   BP Location: Left arm   Patient Position: Sitting   Pulse: 85   SpO2: 97%   Weight: 94.3 kg (208 lb)   Height: 168.9 cm (66.5\")       Body mass index is 33.07 kg/m².    Constitutional:       Appearance: Healthy appearance. Not in distress.   Neck:      Vascular: No JVR. JVD normal.   Pulmonary:      Effort: Pulmonary effort is normal.     "  Breath sounds: Normal breath sounds. No wheezing. No rhonchi. No rales.   Chest:      Chest wall: Not tender to palpatation.   Cardiovascular:      PMI at left midclavicular line. Normal rate. Regular rhythm. Normal S1. Normal S2.       Murmurs: There is no murmur.      No gallop.  No click. No rub.   Pulses:     Intact distal pulses.   Edema:     Peripheral edema absent.   Abdominal:      General: Bowel sounds are normal.      Palpations: Abdomen is soft.      Tenderness: There is no abdominal tenderness.   Musculoskeletal: Normal range of motion.         General: No tenderness. Skin:     General: Skin is warm and dry.   Neurological:      General: No focal deficit present.      Mental Status: Alert and oriented to person, place and time.         Diagnostic Data:  Procedures      ASSESSMENT:   Diagnosis Plan   1. Coronary artery disease involving native coronary artery of native heart without angina pectoris        2. Primary hypertension        3. Mixed hyperlipidemia              PLAN:  CAD as manifested by coronary calcium on CT scan continue aggressive risk factor modification medical management    Hypertension controlled on 4 drug regimen with dual calcium channel blockade of which she is tolerant    Mixed dyslipidemia controlled on statin therapy reiterated plaque stabilization indication for            Alex Josue MD, FACC

## 2024-02-14 ENCOUNTER — TELEMEDICINE (OUTPATIENT)
Dept: INTERNAL MEDICINE | Facility: CLINIC | Age: 69
End: 2024-02-14
Payer: COMMERCIAL

## 2024-02-14 ENCOUNTER — TELEPHONE (OUTPATIENT)
Dept: INTERNAL MEDICINE | Facility: CLINIC | Age: 69
End: 2024-02-14
Payer: COMMERCIAL

## 2024-02-14 DIAGNOSIS — J44.1 COPD WITH EXACERBATION: Primary | ICD-10-CM

## 2024-02-14 PROCEDURE — 99213 OFFICE O/P EST LOW 20 MIN: CPT | Performed by: INTERNAL MEDICINE

## 2024-02-14 RX ORDER — AZITHROMYCIN 250 MG/1
TABLET, FILM COATED ORAL
Qty: 6 TABLET | Refills: 0 | Status: SHIPPED | OUTPATIENT
Start: 2024-02-14 | End: 2024-02-20

## 2024-02-14 RX ORDER — ALBUTEROL SULFATE 90 UG/1
2 AEROSOL, METERED RESPIRATORY (INHALATION) EVERY 4 HOURS PRN
Qty: 18 G | Refills: 5 | Status: SHIPPED | OUTPATIENT
Start: 2024-02-14

## 2024-02-14 RX ORDER — PREDNISONE 10 MG/1
20 TABLET ORAL DAILY
Qty: 14 TABLET | Refills: 0 | Status: SHIPPED | OUTPATIENT
Start: 2024-02-14 | End: 2024-02-20

## 2024-02-14 NOTE — PROGRESS NOTES
[facility]     Agustina Reyes  1955   [id number]      Patient Care Team:  Rich Zaragoza MD as PCP - General (Internal Medicine)  Rich Zaragoza MD as PCP - Internal Medicine (Internal Medicine)  China Ivey APRN as Referring Physician (Neurology)  Paco Bernal MD as Consulting Physician (Cardiology)  Eusebia Gomes APRN as Nurse Practitioner (Pulmonary Disease)  Dc, Aria FARAH DO as Consulting Physician (Pulmonary Disease)    Chief Complaint::   Chief Complaint   Patient presents with    Cough      You have chosen to receive care through a telehealth visit.  Do you consent to use a video/audio connection for your medical care today? Yes    Video visit performed from my office in [place], [place], with pt in her [place].       HPI  The patient is a 68-year-old female consulted via video visit because of a persistent cough and chest congestion.     She measured oxygen saturation today at 88 percent. She has known COPD. She developed symptoms approximately 5 days ago with sore throat and low-grade fever. Three days ago, she developed a cough which has been nonproductive, but she has wheezing and again the oxygen saturation of 88 percent. She feels tired, but not lightheaded. She is mildly short of breath with exertion. She has had no fever today.    She states last week her sister had bronchitis.    Chronic Conditions:      Patient Active Problem List   Diagnosis    Occipital neuralgia of left side    Hypertension    Migraine with aura and without status migrainosus, not intractable    Hyperlipidemia    Occlusion and stenosis of left posterior cerebral artery    History of CVA (cerebrovascular accident)    Prediabetes    Stage 2 moderate COPD by GOLD classification    Abnormal CT of the chest    Non-seasonal allergic rhinitis    Gastroesophageal reflux disease with esophagitis without hemorrhage    Other acute sinusitis    Incidental lung nodule,  greater than or equal to 8mm    Shortness of breath        Past Medical History:   Diagnosis Date    COPD (chronic obstructive pulmonary disease) 2020    Hyperlipidemia     Hypertension     Migraines     Occipital neuralgia of left side     Pneumonia     Shingles     Stroke        Past Surgical History:   Procedure Laterality Date    CATARACT EXTRACTION      COLONOSCOPY      HYSTERECTOMY  2000    SUBTOTAL HYSTERECTOMY         Family History   Problem Relation Age of Onset    Heart disease Mother     Stroke Mother     Hypertension Mother     Heart disease Father     Hypertension Father     Stroke Maternal Grandfather     No Known Problems Sister     Breast cancer Neg Hx     Ovarian cancer Neg Hx        Social History     Socioeconomic History    Marital status: Single   Tobacco Use    Smoking status: Former     Packs/day: 1.00     Years: 40.00     Additional pack years: 0.00     Total pack years: 40.00     Types: Cigarettes     Quit date: 2014     Years since quittin.5    Smokeless tobacco: Never   Vaping Use    Vaping Use: Never used   Substance and Sexual Activity    Alcohol use: Yes     Comment: occ.    Drug use: No    Sexual activity: Defer       Allergies   Allergen Reactions    Erythromycin GI Intolerance     Nausea      Amlodipine Other (See Comments)     Pt states she does not remember an allergy to this medicine, states her legs were swelling    Lisinopril Other (See Comments)     Joint pain    Sulfa Antibiotics Unknown (See Comments)     Pt's home pharmacy has sulfa drugs listed as an allergy.         Current Outpatient Medications:     albuterol sulfate  (90 Base) MCG/ACT inhaler, Inhale 2 puffs Every 4 (Four) Hours As Needed for Wheezing., Disp: 18 g, Rfl: 5    aspirin 81 MG tablet, Take 1 tablet by mouth Daily., Disp: , Rfl:     atorvastatin (LIPITOR) 40 MG tablet, Take 1 tablet by mouth Daily., Disp: 90 tablet, Rfl: 3    azithromycin (Zithromax Z-Marcelino) 250 MG tablet, Take 2 tablets the  first day, then 1 tablet daily for 4 days. (Patient not taking: Reported on 2/19/2024), Disp: 6 tablet, Rfl: 0    benzonatate (TESSALON) 200 MG capsule, Take 1 capsule by mouth 2 (Two) Times a Day As Needed for Cough., Disp: 30 capsule, Rfl: 1    Budeson-Glycopyrrol-Formoterol (Breztri Aerosphere) 160-9-4.8 MCG/ACT aerosol inhaler, INHALE 2 PUFFS BY MOUTH TWICE DAILY, Disp: 32.1 g, Rfl: 1    butalbital-acetaminophen  MG tablet tablet, Take 1-2 tablets by mouth Every 6 (Six) Hours As Needed (migraines)., Disp: 15 each, Rfl: 0    chlorthalidone (HYGROTON) 25 MG tablet, Take 1 tablet by mouth Daily., Disp: 90 tablet, Rfl: 3    losartan (COZAAR) 100 MG tablet, Take 1 tablet by mouth Daily., Disp: 90 tablet, Rfl: 3    predniSONE (DELTASONE) 10 MG tablet, Take 2 tablets by mouth Daily., Disp: 14 tablet, Rfl: 0    prochlorperazine (COMPAZINE) 5 MG tablet, Take 1-2 tablets by mouth Every 6 (Six) Hours As Needed for Nausea or Vomiting (migraine)., Disp: 20 tablet, Rfl: 5    verapamil SR (CALAN-SR) 240 MG CR tablet, Take 1 tablet by mouth Every Night. (Patient taking differently: Take 1 tablet by mouth Daily.), Disp: 90 tablet, Rfl: 3    Review of Systems   Review of systems is otherwise unremarkable.    Vital Signs  There were no vitals filed for this visit.    Physical Exam  Constitutional:       General: She is not in acute distress.     Appearance: Normal appearance.   HENT:      Head: Normocephalic and atraumatic.   Eyes:      Extraocular Movements: Extraocular movements intact.      Pupils: Pupils are equal, round, and reactive to light.   Pulmonary:      Effort: Pulmonary effort is normal. No respiratory distress.      Comments: Audible wheezing on expiration  Musculoskeletal:      Cervical back: Normal range of motion.   Neurological:      Mental Status: She is alert and oriented to person, place, and time.      Cranial Nerves: No cranial nerve deficit.   Psychiatric:         Mood and Affect: Mood normal.          Behavior: Behavior normal.          Procedures    ACE III MINI             Assessment/Plan:    Diagnoses and all orders for this visit:    1. COPD with exacerbation (Primary)    Other orders  -     albuterol sulfate  (90 Base) MCG/ACT inhaler; Inhale 2 puffs Every 4 (Four) Hours As Needed for Wheezing.  Dispense: 18 g; Refill: 5  -     azithromycin (Zithromax Z-Marcelino) 250 MG tablet; Take 2 tablets the first day, then 1 tablet daily for 4 days. (Patient not taking: Reported on 2/19/2024)  Dispense: 6 tablet; Refill: 0  -     predniSONE (DELTASONE) 10 MG tablet; Take 2 tablets by mouth Daily.  Dispense: 14 tablet; Refill: 0    1. COPD exacerbation  - She was given prednisone 20 mg a day for 7 days, azithromycin, and I renewed her albuterol inhaler. She will continue Breztri twice a day. We discussed the fact that she did not have her CT scan to follow up pulmonary nodule as suggested. She states her Achilles tendon problem interfered with that. She has an appointment with a pulmonologist at the end of the month.    Follow up with me as previously scheduled.    Plan of care reviewed with patient at the conclusion of today's visit. Education was provided regarding diagnosis, management, and any prescribed or recommended OTC medications.Patient verbalizes understanding of and agreement with management plan.         Rich Zaragoza MD       Transcribed from ambient dictation for Rich Zaragoza MD by Harriett Espinoza.  02/14/24   17:57 EST    Patient or patient representative verbalized consent to the visit recording.  I have personally performed the services described in this document as transcribed by the above individual, and it is both accurate and complete.

## 2024-02-19 ENCOUNTER — LAB (OUTPATIENT)
Dept: LAB | Facility: HOSPITAL | Age: 69
End: 2024-02-19
Payer: COMMERCIAL

## 2024-02-19 ENCOUNTER — OFFICE VISIT (OUTPATIENT)
Dept: INTERNAL MEDICINE | Facility: CLINIC | Age: 69
End: 2024-02-19
Payer: COMMERCIAL

## 2024-02-19 ENCOUNTER — HOSPITAL ENCOUNTER (OUTPATIENT)
Dept: GENERAL RADIOLOGY | Facility: HOSPITAL | Age: 69
Discharge: HOME | End: 2024-02-19
Payer: COMMERCIAL

## 2024-02-19 VITALS
OXYGEN SATURATION: 92 % | TEMPERATURE: 97.7 F | DIASTOLIC BLOOD PRESSURE: 58 MMHG | HEIGHT: 66 IN | HEART RATE: 78 BPM | BODY MASS INDEX: 32.95 KG/M2 | SYSTOLIC BLOOD PRESSURE: 140 MMHG | WEIGHT: 205 LBS

## 2024-02-19 DIAGNOSIS — R06.02 SHORTNESS OF BREATH: ICD-10-CM

## 2024-02-19 DIAGNOSIS — R06.02 SHORTNESS OF BREATH: Primary | ICD-10-CM

## 2024-02-19 DIAGNOSIS — J44.9 STAGE 2 MODERATE COPD BY GOLD CLASSIFICATION: ICD-10-CM

## 2024-02-19 DIAGNOSIS — R09.81 NASAL CONGESTION: ICD-10-CM

## 2024-02-19 LAB
EXPIRATION DATE: NORMAL
FLUAV AG UPPER RESP QL IA.RAPID: NOT DETECTED
FLUBV AG UPPER RESP QL IA.RAPID: NOT DETECTED
INTERNAL CONTROL: NORMAL
Lab: NORMAL
SARS-COV-2 AG UPPER RESP QL IA.RAPID: NOT DETECTED

## 2024-02-19 PROCEDURE — 85025 COMPLETE CBC W/AUTO DIFF WBC: CPT

## 2024-02-19 PROCEDURE — 80053 COMPREHEN METABOLIC PANEL: CPT

## 2024-02-19 PROCEDURE — 71046 X-RAY EXAM CHEST 2 VIEWS: CPT

## 2024-02-19 RX ORDER — BENZONATATE 200 MG/1
200 CAPSULE ORAL 2 TIMES DAILY PRN
Qty: 30 CAPSULE | Refills: 1 | Status: SHIPPED | OUTPATIENT
Start: 2024-02-19

## 2024-02-19 RX ORDER — CODEINE PHOSPHATE/GUAIFENESIN 10-100MG/5
5 LIQUID (ML) ORAL 3 TIMES DAILY PRN
Status: CANCELLED | OUTPATIENT
Start: 2024-02-19

## 2024-02-19 NOTE — PROGRESS NOTES
[facility] Internal Medicine    Agustina Reyes  1955   [id number]      Patient Care Team:  Rich Zaragoza MD as PCP - General (Internal Medicine)  Rich Zaragoza MD as PCP - Internal Medicine (Internal Medicine)  China Ivey APRN as Referring Physician (Neurology)  Paco Bernal MD as Consulting Physician (Cardiology)  Eusebia Gomes APRN as Nurse Practitioner (Pulmonary Disease)  Case, Aria FARAH DO as Consulting Physician (Pulmonary Disease)    Chief Complaint::   Chief Complaint   Patient presents with    Cough    Shortness of Breath        HPI  Agustina is a 68 year old female who presents for evaluation of cough, shortness of breath, and congestion.  She was seen via telehealth last week for URI and prescribed Zpack and 5 day course of steroids.    Today, she reports continued deep non-productive cough with shortness of breath.  Cough is worse laying down, experiences coughing fits where O2 levels at home drop to 88.    Goes back to pulmonology next week for scheduled followup.     Patient Active Problem List   Diagnosis    Occipital neuralgia of left side    Hypertension    Migraine with aura and without status migrainosus, not intractable    Hyperlipidemia    Occlusion and stenosis of left posterior cerebral artery    History of CVA (cerebrovascular accident)    Prediabetes    Stage 2 moderate COPD by GOLD classification    Abnormal CT of the chest    Non-seasonal allergic rhinitis    Gastroesophageal reflux disease with esophagitis without hemorrhage    Other acute sinusitis    Incidental lung nodule, greater than or equal to 8mm    Shortness of breath    Nasal congestion        Past Medical History:   Diagnosis Date    COPD (chronic obstructive pulmonary disease) May2020    Hyperlipidemia     Hypertension     Migraines     Occipital neuralgia of left side     Pneumonia     Shingles     Stroke        Past Surgical History:   Procedure Laterality Date    CATARACT  "EXTRACTION      COLONOSCOPY      HYSTERECTOMY  2000    SUBTOTAL HYSTERECTOMY         Family History   Problem Relation Age of Onset    Heart disease Mother     Stroke Mother     Hypertension Mother     Heart disease Father     Hypertension Father     Stroke Maternal Grandfather     No Known Problems Sister     Breast cancer Neg Hx     Ovarian cancer Neg Hx        Social History     Socioeconomic History    Marital status: Single   Tobacco Use    Smoking status: Former     Packs/day: 1.00     Years: 40.00     Additional pack years: 0.00     Total pack years: 40.00     Types: Cigarettes     Quit date: 2014     Years since quittin.5    Smokeless tobacco: Never   Vaping Use    Vaping Use: Never used   Substance and Sexual Activity    Alcohol use: Yes     Comment: occ.    Drug use: No    Sexual activity: Defer       Allergies   Allergen Reactions    Erythromycin GI Intolerance     Nausea      Amlodipine Other (See Comments)     Pt states she does not remember an allergy to this medicine, states her legs were swelling    Lisinopril Other (See Comments)     Joint pain    Sulfa Antibiotics Unknown (See Comments)     Pt's home pharmacy has sulfa drugs listed as an allergy.       Review of Systems   Constitutional:  Negative for fever.   HENT:  Positive for congestion, postnasal drip, rhinorrhea and sore throat. Negative for sinus pressure and voice change.    Respiratory:  Positive for cough, chest tightness, shortness of breath and wheezing.    Musculoskeletal:  Negative for arthralgias, joint swelling and myalgias.   Neurological:  Positive for light-headedness. Negative for headache.   Psychiatric/Behavioral:  Positive for sleep disturbance.         Vital Signs  Vitals:    24 1126   BP: 140/58   BP Location: Left arm   Patient Position: Sitting   Cuff Size: Adult   Pulse: 78   Temp: 97.7 °F (36.5 °C)   TempSrc: Infrared   SpO2: 92%   Weight: 93 kg (205 lb)   Height: 168.9 cm (66.5\")   PainSc: 0-No pain "     Body mass index is 32.6 kg/m².        Advance Care Planning   ACP discussion was held with the patient during this visit. Patient does not have an advance directive, information provided.       Current Outpatient Medications:     albuterol sulfate  (90 Base) MCG/ACT inhaler, Inhale 2 puffs Every 4 (Four) Hours As Needed for Wheezing., Disp: 18 g, Rfl: 5    aspirin 81 MG tablet, Take 1 tablet by mouth Daily., Disp: , Rfl:     atorvastatin (LIPITOR) 40 MG tablet, Take 1 tablet by mouth Daily., Disp: 90 tablet, Rfl: 3    Budeson-Glycopyrrol-Formoterol (Breztri Aerosphere) 160-9-4.8 MCG/ACT aerosol inhaler, INHALE 2 PUFFS BY MOUTH TWICE DAILY, Disp: 32.1 g, Rfl: 1    butalbital-acetaminophen  MG tablet tablet, Take 1-2 tablets by mouth Every 6 (Six) Hours As Needed (migraines)., Disp: 15 each, Rfl: 0    chlorthalidone (HYGROTON) 25 MG tablet, Take 1 tablet by mouth Daily., Disp: 90 tablet, Rfl: 3    losartan (COZAAR) 100 MG tablet, Take 1 tablet by mouth Daily., Disp: 90 tablet, Rfl: 3    predniSONE (DELTASONE) 10 MG tablet, Take 2 tablets by mouth Daily., Disp: 14 tablet, Rfl: 0    prochlorperazine (COMPAZINE) 5 MG tablet, Take 1-2 tablets by mouth Every 6 (Six) Hours As Needed for Nausea or Vomiting (migraine)., Disp: 20 tablet, Rfl: 5    verapamil SR (CALAN-SR) 240 MG CR tablet, Take 1 tablet by mouth Every Night. (Patient taking differently: Take 1 tablet by mouth Daily.), Disp: 90 tablet, Rfl: 3    azithromycin (Zithromax Z-Marcelino) 250 MG tablet, Take 2 tablets the first day, then 1 tablet daily for 4 days. (Patient not taking: Reported on 2/19/2024), Disp: 6 tablet, Rfl: 0    benzonatate (TESSALON) 200 MG capsule, Take 1 capsule by mouth 2 (Two) Times a Day As Needed for Cough., Disp: 30 capsule, Rfl: 1    doxycycline (MONODOX) 100 MG capsule, Take 1 capsule by mouth 2 (Two) Times a Day., Disp: 20 capsule, Rfl: 0    Physical Exam  Vitals and nursing note reviewed.   Constitutional:       General: She  is not in acute distress.     Appearance: She is well-developed. She is not diaphoretic.   HENT:      Head: Normocephalic and atraumatic.      Nose: Nose normal.   Neck:      Vascular: No JVD.   Cardiovascular:      Rate and Rhythm: Normal rate and regular rhythm.      Heart sounds: Normal heart sounds. No murmur heard.  Pulmonary:      Effort: Pulmonary effort is normal. No respiratory distress.      Breath sounds: No stridor. Wheezing present.   Musculoskeletal:      Cervical back: Neck supple.   Lymphadenopathy:      Cervical: No cervical adenopathy.          ACE III MINI            Results Review:    Recent Results (from the past 672 hour(s))   Comprehensive Metabolic Panel    Collection Time: 02/19/24 12:40 PM    Specimen: Blood   Result Value Ref Range    Glucose 86 65 - 99 mg/dL    BUN 19 8 - 23 mg/dL    Creatinine 1.25 (H) 0.57 - 1.00 mg/dL    Sodium 138 136 - 145 mmol/L    Potassium 3.6 3.5 - 5.2 mmol/L    Chloride 98 98 - 107 mmol/L    CO2 28.0 22.0 - 29.0 mmol/L    Calcium 9.7 8.6 - 10.5 mg/dL    Total Protein 7.0 6.0 - 8.5 g/dL    Albumin 4.2 3.5 - 5.2 g/dL    ALT (SGPT) 28 1 - 33 U/L    AST (SGOT) 28 1 - 32 U/L    Alkaline Phosphatase 78 39 - 117 U/L    Total Bilirubin 0.3 0.0 - 1.2 mg/dL    Globulin 2.8 gm/dL    A/G Ratio 1.5 g/dL    BUN/Creatinine Ratio 15.2 7.0 - 25.0    Anion Gap 12.0 5.0 - 15.0 mmol/L    eGFR 47.0 (L) >60.0 mL/min/1.73   CBC Auto Differential    Collection Time: 02/19/24 12:40 PM    Specimen: Blood   Result Value Ref Range    WBC 18.30 (H) 3.40 - 10.80 10*3/mm3    RBC 4.10 3.77 - 5.28 10*6/mm3    Hemoglobin 10.5 (L) 12.0 - 15.9 g/dL    Hematocrit 33.0 (L) 34.0 - 46.6 %    MCV 80.5 79.0 - 97.0 fL    MCH 25.6 (L) 26.6 - 33.0 pg    MCHC 31.8 31.5 - 35.7 g/dL    RDW 16.2 (H) 12.3 - 15.4 %    RDW-SD 46.8 37.0 - 54.0 fl    MPV 9.7 6.0 - 12.0 fL    Platelets 441 140 - 450 10*3/mm3    Neutrophil % 70.8 42.7 - 76.0 %    Lymphocyte % 21.5 19.6 - 45.3 %    Monocyte % 5.0 5.0 - 12.0 %     Eosinophil % 0.7 0.3 - 6.2 %    Basophil % 0.4 0.0 - 1.5 %    Immature Grans % 1.6 (H) 0.0 - 0.5 %    Neutrophils, Absolute 12.95 (H) 1.70 - 7.00 10*3/mm3    Lymphocytes, Absolute 3.94 (H) 0.70 - 3.10 10*3/mm3    Monocytes, Absolute 0.91 (H) 0.10 - 0.90 10*3/mm3    Eosinophils, Absolute 0.12 0.00 - 0.40 10*3/mm3    Basophils, Absolute 0.08 0.00 - 0.20 10*3/mm3    Immature Grans, Absolute 0.30 (H) 0.00 - 0.05 10*3/mm3    nRBC 0.1 0.0 - 0.2 /100 WBC   POCT SARS-CoV-2 + Flu Antigen SUSI    Collection Time: 24 12:55 PM    Specimen: Swab   Result Value Ref Range    SARS Antigen Not Detected Not Detected, Presumptive Negative    Influenza A Antigen SUSI Not Detected Not Detected    Influenza B Antigen SUSI Not Detected Not Detected    Internal Control Passed Passed    Lot Number [id number]     Expiration Date 2025      Procedures    Medication Review: Medications reviewed and noted    Social History     Socioeconomic History    Marital status: Single   Tobacco Use    Smoking status: Former     Packs/day: 1.00     Years: 40.00     Additional pack years: 0.00     Total pack years: 40.00     Types: Cigarettes     Quit date: 2014     Years since quittin.5    Smokeless tobacco: Never   Vaping Use    Vaping Use: Never used   Substance and Sexual Activity    Alcohol use: Yes     Comment: occ.    Drug use: No    Sexual activity: Defer        Assessment/Plan:    Diagnoses and all orders for this visit:    1. Shortness of breath (Primary)  Overview:  Cannot rule out pneumonia.  O2 in the office 92  Chest xray today  CBC and CMP ordered.  She has completed z pack and prednisone.  Will send in doxycycline.  Tessalon perles at night.    Orders:  -     XR Chest PA & Lateral; Future  -     CBC & Differential; Future  -     Comprehensive Metabolic Panel; Future  -     benzonatate (TESSALON) 200 MG capsule; Take 1 capsule by mouth 2 (Two) Times a Day As Needed for Cough.  Dispense: 30 capsule; Refill: 1  -     doxycycline  (MONODOX) 100 MG capsule; Take 1 capsule by mouth 2 (Two) Times a Day.  Dispense: 20 capsule; Refill: 0    2. Nasal congestion  -     POCT SARS-CoV-2 + Flu Antigen SUSI    3. Stage 2 moderate COPD by GOLD classification  -     XR Chest PA & Lateral; Future         There are no Patient Instructions on file for this visit.     Plan of care reviewed with patient at the conclusion of today's visit. Education was provided regarding diagnosis, management, and any prescribed or recommended OTC medications.Patient verbalizes understanding of and agreement with management plan.         I have seen Agustina on this date of service. This time includes time spent by me in the following activities:preparing for the visit, reviewing tests, obtaining and/or reviewing a separately obtained history, performing a medically appropriate examination and/or evaluation , counseling and educating the patient/family/caregiver, ordering medications, tests, or procedures, referring and communicating with other health care professionals , and documenting information in the medical record    Yesenia Reyes PA-C      Note: Part of this note may be an electronic transcription/translation of spoken language to printed text using the Dragon Dictation system.

## 2024-02-20 ENCOUNTER — TELEPHONE (OUTPATIENT)
Dept: INTERNAL MEDICINE | Facility: CLINIC | Age: 69
End: 2024-02-20
Payer: COMMERCIAL

## 2024-02-20 DIAGNOSIS — R06.02 SHORTNESS OF BREATH: Primary | ICD-10-CM

## 2024-02-20 PROBLEM — R09.81 NASAL CONGESTION: Status: ACTIVE | Noted: 2024-02-20

## 2024-02-20 LAB
ALBUMIN SERPL-MCNC: 4.2 G/DL (ref 3.5–5.2)
ALBUMIN/GLOB SERPL: 1.5 G/DL
ALP SERPL-CCNC: 78 U/L (ref 39–117)
ALT SERPL W P-5'-P-CCNC: 28 U/L (ref 1–33)
ANION GAP SERPL CALCULATED.3IONS-SCNC: 12 MMOL/L (ref 5–15)
AST SERPL-CCNC: 28 U/L (ref 1–32)
BASOPHILS # BLD AUTO: 0.08 10*3/MM3 (ref 0–0.2)
BASOPHILS NFR BLD AUTO: 0.4 % (ref 0–1.5)
BILIRUB SERPL-MCNC: 0.3 MG/DL (ref 0–1.2)
BUN SERPL-MCNC: 19 MG/DL (ref 8–23)
BUN/CREAT SERPL: 15.2 (ref 7–25)
CALCIUM SPEC-SCNC: 9.7 MG/DL (ref 8.6–10.5)
CHLORIDE SERPL-SCNC: 98 MMOL/L (ref 98–107)
CO2 SERPL-SCNC: 28 MMOL/L (ref 22–29)
CREAT SERPL-MCNC: 1.25 MG/DL (ref 0.57–1)
DEPRECATED RDW RBC AUTO: 46.8 FL (ref 37–54)
EGFRCR SERPLBLD CKD-EPI 2021: 47 ML/MIN/1.73
EOSINOPHIL # BLD AUTO: 0.12 10*3/MM3 (ref 0–0.4)
EOSINOPHIL NFR BLD AUTO: 0.7 % (ref 0.3–6.2)
ERYTHROCYTE [DISTWIDTH] IN BLOOD BY AUTOMATED COUNT: 16.2 % (ref 12.3–15.4)
GLOBULIN UR ELPH-MCNC: 2.8 GM/DL
GLUCOSE SERPL-MCNC: 86 MG/DL (ref 65–99)
HCT VFR BLD AUTO: 33 % (ref 34–46.6)
HGB BLD-MCNC: 10.5 G/DL (ref 12–15.9)
IMM GRANULOCYTES # BLD AUTO: 0.3 10*3/MM3 (ref 0–0.05)
IMM GRANULOCYTES NFR BLD AUTO: 1.6 % (ref 0–0.5)
LYMPHOCYTES # BLD AUTO: 3.94 10*3/MM3 (ref 0.7–3.1)
LYMPHOCYTES NFR BLD AUTO: 21.5 % (ref 19.6–45.3)
MCH RBC QN AUTO: 25.6 PG (ref 26.6–33)
MCHC RBC AUTO-ENTMCNC: 31.8 G/DL (ref 31.5–35.7)
MCV RBC AUTO: 80.5 FL (ref 79–97)
MONOCYTES # BLD AUTO: 0.91 10*3/MM3 (ref 0.1–0.9)
MONOCYTES NFR BLD AUTO: 5 % (ref 5–12)
NEUTROPHILS NFR BLD AUTO: 12.95 10*3/MM3 (ref 1.7–7)
NEUTROPHILS NFR BLD AUTO: 70.8 % (ref 42.7–76)
NRBC BLD AUTO-RTO: 0.1 /100 WBC (ref 0–0.2)
PLATELET # BLD AUTO: 441 10*3/MM3 (ref 140–450)
PMV BLD AUTO: 9.7 FL (ref 6–12)
POTASSIUM SERPL-SCNC: 3.6 MMOL/L (ref 3.5–5.2)
PROT SERPL-MCNC: 7 G/DL (ref 6–8.5)
RBC # BLD AUTO: 4.1 10*6/MM3 (ref 3.77–5.28)
SODIUM SERPL-SCNC: 138 MMOL/L (ref 136–145)
WBC NRBC COR # BLD AUTO: 18.3 10*3/MM3 (ref 3.4–10.8)

## 2024-02-20 RX ORDER — PREDNISONE 10 MG/1
TABLET ORAL
Qty: 30 TABLET | Refills: 0 | Status: SHIPPED | OUTPATIENT
Start: 2024-02-20

## 2024-02-20 RX ORDER — DOXYCYCLINE 100 MG/1
100 CAPSULE ORAL 2 TIMES DAILY
Qty: 20 CAPSULE | Refills: 0 | Status: SHIPPED | OUTPATIENT
Start: 2024-02-20

## 2024-02-20 NOTE — TELEPHONE ENCOUNTER
Caller: Agustina Reyes    Relationship: Self    Best call back number: [phone]    What is the best time to reach you: ANYTIME     Who are you requesting to speak with (clinical staff, provider,  specific staff member): CLINICAL STAFF    What was the call regarding: PATIENT IS CALLING TO SPEAK WITH THE CLINICAL STAFF TO GET HER RESULTS FROM HER XRAY.     Is it okay if the provider responds through MyChart: NO

## 2024-02-21 ENCOUNTER — TELEPHONE (OUTPATIENT)
Dept: INTERNAL MEDICINE | Facility: CLINIC | Age: 69
End: 2024-02-21
Payer: COMMERCIAL

## 2024-02-21 NOTE — TELEPHONE ENCOUNTER
Caller: Agustina Calix    Relationship: Self    Best call back number: [phone]    What form or medical record are you requesting: [employer] PAPERWORK    Who is requesting this form or medical record from you: WORK    How would you like to receive the form or medical records (pick-up, mail, fax): FAX  If fax, what is the fax number: [phone]      Timeframe paperwork needed: ASAP, PATIENT WAS IN ON MONDAY 02/19/2024 AND HER WORK NEEDED THE PAPERWORK TODAY    Additional notes: FEBRUARY 20TH -26TH ARE THE DATES, REASON WHY SHE NEEDS THE EXTENSION: PATIENT HAS NEVER RECEIVED A CALL FROM MS CALIX TO TELL HER WHAT SHE IS TREATING HER FOR. PATIENT WAS IN ON MONDAY 02/19/2024. PLEASE ALSO CALL PATIENT AS SHE DOES NOT KNOW WHAT SHE IS BEING TREATED FOR.

## 2024-02-23 ENCOUNTER — TELEPHONE (OUTPATIENT)
Dept: INTERNAL MEDICINE | Facility: CLINIC | Age: 69
End: 2024-02-23
Payer: COMMERCIAL

## 2024-02-23 NOTE — TELEPHONE ENCOUNTER
Caller: Agustina Reyes    Relationship: Self    Best call back number: [phone]     What form or medical record are you requesting: FMLA PAPERWORK    Who is requesting this form or medical record from you: EMPLOYER    Timeframe paperwork needed: ASP    Additional notes: FAXED 02.22.24, HAS IT BEEN RECEIVED  NEEDS COMPLETED 02.23.24

## 2024-02-23 NOTE — TELEPHONE ENCOUNTER
I don't know anything about her leave.  I wrote an excuse for work through today. That was sent yesterday.

## 2024-02-26 NOTE — TELEPHONE ENCOUNTER
Caller: Agustina Calix    Relationship: Self    Best call back number: [phone]     What is the best time to reach you: ANY    Who are you requesting to speak with (clinical staff, provider,  specific staff member): DR. MUNOZ/ REBECCA CALIX    What was the call regarding: THE PATIENT HAD NOT HEARD ANYTHING BACK ABOUT HER FMLA. SHE WORKS AT [employer] AND THEY DO NOT ALLOW SICK DAYS OVER TWO DAYS. FOR JOB SECURITY THEY REQUIRE FMLA TO BE DONE. SHE SAID THAT THIS WAS SUPPOSED TO BE DONE LAST WEEK. SHE SAW REBECCA CALIX ON 02.19.24 AND IS BEING TREATED FOR BRONCHITIS. PLEASE COMPLETE ASAP AND CALL PATIENT BACK TO LET HER KNOW.     Is it okay if the provider responds through [employer]: NO

## 2024-02-29 ENCOUNTER — OFFICE VISIT (OUTPATIENT)
Dept: PULMONOLOGY | Facility: CLINIC | Age: 69
End: 2024-02-29
Payer: COMMERCIAL

## 2024-02-29 VITALS
OXYGEN SATURATION: 95 % | SYSTOLIC BLOOD PRESSURE: 132 MMHG | HEIGHT: 67 IN | DIASTOLIC BLOOD PRESSURE: 68 MMHG | TEMPERATURE: 97.4 F | WEIGHT: 211 LBS | HEART RATE: 92 BPM | BODY MASS INDEX: 33.12 KG/M2

## 2024-02-29 DIAGNOSIS — R91.1 LUNG NODULE: Primary | ICD-10-CM

## 2024-02-29 DIAGNOSIS — J44.9 STAGE 2 MODERATE COPD BY GOLD CLASSIFICATION: ICD-10-CM

## 2024-02-29 RX ORDER — BUDESONIDE, GLYCOPYRROLATE, AND FORMOTEROL FUMARATE 160; 9; 4.8 UG/1; UG/1; UG/1
2 AEROSOL, METERED RESPIRATORY (INHALATION) 2 TIMES DAILY
Qty: 3 EACH | Refills: 3 | Status: SHIPPED | OUTPATIENT
Start: 2024-02-29

## 2024-02-29 NOTE — PROGRESS NOTES
"Pulmonary Office Follow Up      Subjective   Chief Complaint: Shortness of Breath    Agustina Reyes is a 68 y.o. female is being seen in follow up for COPD    History of Present Illness    Ms. Reyes is a 67yo F who is followed for COPD and an abnormal CT scan of the chest. She was last seen in clinic on 2/27/23.    She returns to clinic today for follow up.     She did not have her follow up CT scans as she broke her heel last July and was non-weight bearing until December 2023.     She most recently has had bronchitis for 3 weeks now. She is finishing up her second round of antibiotics and steroids. She remains on Breztri and Albuterol.     The following portions of the patient's history were reviewed and updated as appropriate: allergies, current medications, past family history, past medical history, past social history, past surgical history and problem list.    Review of Systems   Constitutional: Negative.    HENT:  Positive for sinus pressure and sinus pain.    Eyes: Negative.    Cardiovascular: Negative.    Gastrointestinal: Negative.    Endocrine: Negative.    Genitourinary: Negative.    Musculoskeletal: Negative.    Skin: Negative.    Allergic/Immunologic: Negative.    Neurological: Negative.    Hematological: Negative.    Psychiatric/Behavioral: Negative.            Objective   Blood pressure 132/68, pulse 92, temperature 97.4 °F (36.3 °C), height 168.9 cm (66.5\"), weight 95.7 kg (211 lb), SpO2 95%, not currently breastfeeding.  Physical Exam  Vitals and nursing note reviewed.   Constitutional:       General: She is not in acute distress.     Appearance: She is well-developed.   HENT:      Head: Normocephalic and atraumatic.   Eyes:      General: No scleral icterus.     Conjunctiva/sclera: Conjunctivae normal.      Pupils: Pupils are equal, round, and reactive to light.   Neck:      Thyroid: No thyromegaly.      Trachea: No tracheal deviation.   Cardiovascular:      Rate and Rhythm: Normal rate and " regular rhythm.      Heart sounds: Normal heart sounds.   Pulmonary:      Effort: Pulmonary effort is normal. No respiratory distress.      Breath sounds: Normal breath sounds.   Abdominal:      General: Bowel sounds are normal.      Palpations: Abdomen is soft.      Tenderness: There is no abdominal tenderness.   Musculoskeletal:         General: Normal range of motion.      Cervical back: Normal range of motion and neck supple.   Lymphadenopathy:      Cervical: No cervical adenopathy.   Skin:     General: Skin is warm and dry.      Findings: No erythema or rash.   Neurological:      Mental Status: She is alert and oriented to person, place, and time.      Motor: No abnormal muscle tone.      Coordination: Coordination normal.   Psychiatric:         Speech: Speech normal.         Behavior: Behavior normal.         Judgment: Judgment normal.         PFTs:  No new PFTs.    Imaging:  No new imaging.     Assessment & Plan   Diagnoses and all orders for this visit:    1. Lung nodule (Primary)  -     CT Chest Without Contrast; Future    2. Stage 2 moderate COPD by GOLD classification  -     Budeson-Glycopyrrol-Formoterol (Breztri Aerosphere) 160-9-4.8 MCG/ACT aerosol inhaler; Inhale 2 puffs 2 (Two) Times a Day.  Dispense: 3 each; Refill: 3          Discussion:  Ms. Reyes is a 67yo F who is followed for COPD and Pulmonary Nodules     1. Moderate COPD  - Continue Breztri.   - Continue Albuterol as needed.   - Complete antibiotics and steroids for an exacerbation.   - I have given her a sample of Airsupra to use PRN.      2. Abnormal CT Chest  - Tree-in-bud nodularity first noticed in May 2021.   - CT chest from 1/19/23 with stable nodularity and a new right lower lobe groundglass nodule measuring 1.7cm  - She missed her follow up 6 month CT scan and it has now been a year. We will order her CT chest now. If the nodules have resolved, she may resume low-dose CT chest for lung cancer screening.      3. Allergic  Rhinitis/Sinusitis  - Continue OTC antihistamine   - Continue Mucinex.   - Nasal sinus rinses instead.      4. GERD  - Elevate the head of the bed.   - Do not eat within 2-3 hours of bedtime when possible.     Follow up in 6 months.        Agustina Reyes  reports that she quit smoking about 9 years ago. Her smoking use included cigarettes. She has a 40.00 pack-year smoking history. She has never used smokeless tobacco.        Aria V Case, DO  Pulmonary and Critical Care Medicine  Note Electronically Signed

## 2024-03-14 ENCOUNTER — HOSPITAL ENCOUNTER (OUTPATIENT)
Dept: CT IMAGING | Facility: HOSPITAL | Age: 69
Discharge: HOME | End: 2024-03-14
Admitting: INTERNAL MEDICINE
Payer: COMMERCIAL

## 2024-03-14 DIAGNOSIS — R91.1 LUNG NODULE: ICD-10-CM

## 2024-03-14 PROCEDURE — 71250 CT THORAX DX C-: CPT

## 2024-03-18 ENCOUNTER — TELEPHONE (OUTPATIENT)
Dept: PULMONOLOGY | Facility: CLINIC | Age: 69
End: 2024-03-18
Payer: COMMERCIAL

## 2024-03-18 NOTE — TELEPHONE ENCOUNTER
Patient called the office today stating she was notified that the results of her most recent CT scan showed some changes and she was quite concerned after researching the results on the internet. Wanted to be told in a way she could understand what the report showed and what the plan would be moving forward.

## 2024-03-19 ENCOUNTER — TELEPHONE (OUTPATIENT)
Dept: PULMONOLOGY | Facility: CLINIC | Age: 69
End: 2024-03-19
Payer: COMMERCIAL

## 2024-03-19 DIAGNOSIS — R93.89 ABNORMAL CT OF THE CHEST: Primary | ICD-10-CM

## 2024-03-19 NOTE — TELEPHONE ENCOUNTER
I returned Mrs Reyes call regarding her CT of the chest.    She has been sick recently, she has been on 2 rounds of antibiotics and steroids but she still having quite a bit of cough with green secretions.  She also complains of generalized fatigue and some occasional wheezing.    She is been using her Breztri twice daily and the air soup that she received at the last office visit once daily.  She stopped using any over-the-counter medications.  I did encourage her to resume her Mucinex to try to help with secretion clearance.    I will follow-up with Dr. Irwin on the CT scan for any further recommendations.  We also discussed that it would be beneficial to try to get a sputum sample, most had some cups at front and she will bring those back to the office.

## 2024-03-27 ENCOUNTER — LAB (OUTPATIENT)
Dept: PULMONOLOGY | Facility: CLINIC | Age: 69
End: 2024-03-27
Payer: COMMERCIAL

## 2024-03-27 DIAGNOSIS — R93.89 ABNORMAL CT OF THE CHEST: ICD-10-CM

## 2024-03-27 PROCEDURE — 87070 CULTURE OTHR SPECIMN AEROBIC: CPT | Performed by: NURSE PRACTITIONER

## 2024-03-27 PROCEDURE — 87116 MYCOBACTERIA CULTURE: CPT | Performed by: NURSE PRACTITIONER

## 2024-03-27 PROCEDURE — 87205 SMEAR GRAM STAIN: CPT | Performed by: NURSE PRACTITIONER

## 2024-03-27 PROCEDURE — 87206 SMEAR FLUORESCENT/ACID STAI: CPT | Performed by: NURSE PRACTITIONER

## 2024-03-29 LAB
BACTERIA SPEC RESP CULT: NORMAL
GRAM STN SPEC: NORMAL

## 2024-04-03 ENCOUNTER — TELEPHONE (OUTPATIENT)
Dept: CARDIOLOGY | Facility: CLINIC | Age: 69
End: 2024-04-03

## 2024-04-03 LAB
MYCOBACTERIUM SPEC CULT: NORMAL
NIGHT BLUE STAIN TISS: NORMAL

## 2024-04-03 NOTE — TELEPHONE ENCOUNTER
Lm for patient to cb regarding new address for the [place] office. Patient has an appointment with Dr. Josue on 01/30/2025 at 2:45pm.  Sdw  05/01/24 Tw patient today.  Gave patient new [place] address, [address] and appointment info.  Patient understands where to go for the [facility] office. Sdw.

## 2024-04-04 DIAGNOSIS — E78.2 MIXED HYPERLIPIDEMIA: ICD-10-CM

## 2024-04-04 DIAGNOSIS — I10 ESSENTIAL HYPERTENSION: ICD-10-CM

## 2024-04-04 RX ORDER — CHLORTHALIDONE 25 MG/1
25 TABLET ORAL DAILY
Qty: 90 TABLET | Refills: 3 | Status: SHIPPED | OUTPATIENT
Start: 2024-04-04

## 2024-04-04 RX ORDER — VERAPAMIL HYDROCHLORIDE 240 MG/1
240 TABLET, FILM COATED, EXTENDED RELEASE ORAL NIGHTLY
Qty: 90 TABLET | Refills: 3 | Status: SHIPPED | OUTPATIENT
Start: 2024-04-04

## 2024-04-04 NOTE — TELEPHONE ENCOUNTER
Lab Results   Component Value Date    GLUCOSE 86 02/19/2024    BUN 19 02/19/2024    CREATININE 1.25 (H) 02/19/2024    EGFR 47.0 (L) 02/19/2024    BCR 15.2 02/19/2024    K 3.6 02/19/2024    CO2 28.0 02/19/2024    CALCIUM 9.7 02/19/2024    ALBUMIN 4.2 02/19/2024    BILITOT 0.3 02/19/2024    AST 28 02/19/2024    ALT 28 02/19/2024

## 2024-04-08 ENCOUNTER — TELEPHONE (OUTPATIENT)
Dept: SLEEP MEDICINE | Facility: CLINIC | Age: 69
End: 2024-04-08
Payer: COMMERCIAL

## 2024-04-10 LAB
MYCOBACTERIUM SPEC CULT: NORMAL
NIGHT BLUE STAIN TISS: NORMAL

## 2024-04-17 LAB
MYCOBACTERIUM SPEC CULT: NORMAL
NIGHT BLUE STAIN TISS: NORMAL

## 2024-04-18 ENCOUNTER — OFFICE VISIT (OUTPATIENT)
Dept: INTERNAL MEDICINE | Facility: CLINIC | Age: 69
End: 2024-04-18
Payer: COMMERCIAL

## 2024-04-18 ENCOUNTER — HOSPITAL ENCOUNTER (OUTPATIENT)
Dept: CARDIOLOGY | Facility: HOSPITAL | Age: 69
Discharge: HOME | End: 2024-04-18
Admitting: INTERNAL MEDICINE
Payer: COMMERCIAL

## 2024-04-18 ENCOUNTER — TELEPHONE (OUTPATIENT)
Dept: INTERNAL MEDICINE | Facility: CLINIC | Age: 69
End: 2024-04-18

## 2024-04-18 VITALS
DIASTOLIC BLOOD PRESSURE: 72 MMHG | HEART RATE: 83 BPM | HEIGHT: 66 IN | BODY MASS INDEX: 33.27 KG/M2 | TEMPERATURE: 99.6 F | OXYGEN SATURATION: 94 % | SYSTOLIC BLOOD PRESSURE: 136 MMHG | WEIGHT: 207 LBS

## 2024-04-18 DIAGNOSIS — R60.1 GENERALIZED EDEMA: Primary | ICD-10-CM

## 2024-04-18 DIAGNOSIS — R60.1 GENERALIZED EDEMA: ICD-10-CM

## 2024-04-18 PROCEDURE — 93970 EXTREMITY STUDY: CPT

## 2024-04-18 PROCEDURE — 99214 OFFICE O/P EST MOD 30 MIN: CPT | Performed by: INTERNAL MEDICINE

## 2024-04-18 NOTE — PROGRESS NOTES
[facility]     Agustina Reyes  1955   [id number]      Patient Care Team:  Rich Zaragoza MD as PCP - General (Internal Medicine)  Rich Zaragoza MD as PCP - Internal Medicine (Internal Medicine)  China Ivey APRN as Referring Physician (Neurology)  Paco Bernal MD as Consulting Physician (Cardiology)  Eusebia Gomes APRN as Nurse Practitioner (Pulmonary Disease)  Case, Aria FARAH DO as Consulting Physician (Pulmonary Disease)    Chief Complaint::   Chief Complaint   Patient presents with    Foot Swelling     hip        HPI  The patient is a 68-year-old female who comes in complaining of swelling in her feet.    The patient, who returned to work part-time for a duration of 3 weeks following her last visit, resumed full-time at the end of 03/2024. This time, she has consistently experienced mild edema around her ankles, which she attributes to prolonged standing for 8 hours daily. However, the onset of edema was 3 weeks ago, which has progressively worsened, particularly towards the end of the day. She describes the edema as resembling a cruly sock, but notes that it is less severe upon waking in the morning. She also reports tightness in her hands and facial puffiness. Despite the swelling, her respiratory function remains unaffected, with no reported shortness of breath. She had to elevate her legs while lying down during her bout with influenza and again in 03/2024. She expresses fear of shaving her legs due to potential skin injuries. She has been utilizing knee socks and compression fitties, but the swelling returns by the time she removes them. Her current medication regimen includes chlorthalidone. She reports that the swelling has caused her foot to throb at the site of her previous surgery, causing soreness and tenderness. She did not experience swelling in her right leg during her surgery last year. Despite not attending therapy due to her inability  to move her ankles, she performs exercises at home. She questions the possibility of kidney failure and arterial blockage. She suspects she may be dehydrated and admits to not drinking enough water.    Supplemental Information  Last year, she broke her heel after falling off a chair and lost part of her heel and underwent reconstructive surgery. She is still recovering from the reattachment of the Achilles.    Her father had congestive heart failure.    Chronic Conditions:      Patient Active Problem List   Diagnosis    Occipital neuralgia of left side    Hypertension    Migraine with aura and without status migrainosus, not intractable    Hyperlipidemia    Occlusion and stenosis of left posterior cerebral artery    History of CVA (cerebrovascular accident)    Prediabetes    Stage 2 moderate COPD by GOLD classification    Abnormal CT of the chest    Non-seasonal allergic rhinitis    Gastroesophageal reflux disease with esophagitis without hemorrhage    Other acute sinusitis    Incidental lung nodule, greater than or equal to 8mm    Shortness of breath    Nasal congestion        Past Medical History:   Diagnosis Date    COPD (chronic obstructive pulmonary disease) May2020    GERD (gastroesophageal reflux disease)     Hyperlipidemia     Hypertension     Migraines     Occipital neuralgia of left side     Pneumonia     Shingles     Stroke        Past Surgical History:   Procedure Laterality Date    CATARACT EXTRACTION      COLONOSCOPY      HYSTERECTOMY  2000    SUBTOTAL HYSTERECTOMY         Family History   Problem Relation Age of Onset    Heart disease Mother     Stroke Mother     Hypertension Mother     Heart disease Father     Hypertension Father     Stroke Maternal Grandfather     No Known Problems Sister     Breast cancer Neg Hx     Ovarian cancer Neg Hx        Social History     Socioeconomic History    Marital status: Single   Tobacco Use    Smoking status: Former     Current packs/day: 0.00     Average  packs/day: 1 pack/day for 40.0 years (40.0 ttl pk-yrs)     Types: Cigarettes     Start date: 1974     Quit date: 2014     Years since quittin.7    Smokeless tobacco: Never   Vaping Use    Vaping status: Never Used   Substance and Sexual Activity    Alcohol use: Yes     Comment: occ.    Drug use: No    Sexual activity: Defer       Allergies   Allergen Reactions    Erythromycin GI Intolerance     Nausea      Amlodipine Other (See Comments)     Pt states she does not remember an allergy to this medicine, states her legs were swelling    Lisinopril Other (See Comments)     Joint pain    Sulfa Antibiotics Unknown (See Comments)     Pt's home pharmacy has sulfa drugs listed as an allergy.         Current Outpatient Medications:     albuterol sulfate  (90 Base) MCG/ACT inhaler, Inhale 2 puffs Every 4 (Four) Hours As Needed for Wheezing., Disp: 18 g, Rfl: 5    aspirin 81 MG tablet, Take 1 tablet by mouth Daily., Disp: , Rfl:     atorvastatin (LIPITOR) 40 MG tablet, Take 1 tablet by mouth Daily., Disp: 90 tablet, Rfl: 3    Budeson-Glycopyrrol-Formoterol (Breztri Aerosphere) 160-9-4.8 MCG/ACT aerosol inhaler, Inhale 2 puffs 2 (Two) Times a Day., Disp: 3 each, Rfl: 3    butalbital-acetaminophen  MG tablet tablet, Take 1-2 tablets by mouth Every 6 (Six) Hours As Needed (migraines)., Disp: 15 each, Rfl: 0    chlorthalidone (HYGROTON) 25 MG tablet, TAKE 1 TABLET BY MOUTH DAILY, Disp: 90 tablet, Rfl: 3    losartan (COZAAR) 100 MG tablet, Take 1 tablet by mouth Daily., Disp: 90 tablet, Rfl: 3    prochlorperazine (COMPAZINE) 5 MG tablet, Take 1-2 tablets by mouth Every 6 (Six) Hours As Needed for Nausea or Vomiting (migraine)., Disp: 20 tablet, Rfl: 5    verapamil SR (CALAN-SR) 240 MG CR tablet, TAKE 1 TABLET BY MOUTH EVERY NIGHT, Disp: 90 tablet, Rfl: 3    apixaban (ELIQUIS) 5 MG tablet tablet, Take 1 tablet by mouth 2 (Two) Times a Day., Disp: 60 tablet, Rfl: 2    Review of Systems   Review of systems is  "otherwise unremarkable.    Vital Signs  Vitals:    04/18/24 1154   BP: 136/72   BP Location: Right arm   Patient Position: Sitting   Cuff Size: Adult   Pulse: 83   Temp: 99.6 °F (37.6 °C)   SpO2: 94%   Weight: 93.9 kg (207 lb)   Height: 168.9 cm (66.5\")   PainSc:   5   PainLoc: Foot       Physical Exam  Constitutional:       Appearance: Normal appearance.   Neck:      Vascular: No carotid bruit.   Cardiovascular:      Rate and Rhythm: Normal rate and regular rhythm.   Pulmonary:      Effort: Pulmonary effort is normal.      Breath sounds: Normal breath sounds.   Musculoskeletal:      Cervical back: Normal range of motion and neck supple.      Comments: She has 2+ pitting edema to the mid-calf in both legs. The right lower extremity is warm to touch and slightly erythematous. The left lower extremity has a normal temperature and is not erythematous. There is no calf tenderness. Homans sign is negative.   Neurological:      Mental Status: She is alert.          Procedures    ACE III MINI             Assessment/Plan:       While bilateral deep vein thrombosis (DVTs) is unlikely, the physical findings on the right are somewhat more suggestive of such conditions. There is no periorbital edema or appreciable edema in her hands during today's examination, however, the patient has observed this. A bilateral Doppler ultrasound will be conducted. Urine studies will be conducted to evaluate for proteinuria, kidney function, and serum protein electrophoresis. In the interim, the patient is advised to wear compression and elevate her legs when she can.    Follow-up  The patient is scheduled for a follow-up appointment.      Plan of care reviewed with patient at the conclusion of today's visit. Education was provided regarding diagnosis, management, and any prescribed or recommended OTC medications.Patient verbalizes understanding of and agreement with management plan.         Rich Zaragoza MD       Transcribed from " ambient dictation for Rich Zaragoza MD by Galina Love  04/18/24   13:36 EDT    Patient or patient representative verbalized consent to the visit recording.  I have personally performed the services described in this document as transcribed by the above individual, and it is both accurate and complete.

## 2024-04-18 NOTE — TELEPHONE ENCOUNTER
[facility] called to report RLE DVT. Preliminary report shows that it appears to be chronic. They want us to call and advise the pt.

## 2024-04-18 NOTE — TELEPHONE ENCOUNTER
I talked to patient and concern for more chronic DVT an right side and call Dr Josue and he agreed with non loading dose eliquis of 5 mg twice a day

## 2024-04-19 LAB
BH CV LOW VAS RIGHT POPLITEAL SPONT: 1
BH CV LOW VAS RIGHT PROXIMAL FEMORAL SPONT: 1
BH CV LOWER VASCULAR LEFT COMMON FEMORAL COMPRESS: NORMAL
BH CV LOWER VASCULAR LEFT COMMON FEMORAL SPONT: NORMAL
BH CV LOWER VASCULAR LEFT DISTAL FEMORAL COMPRESS: NORMAL
BH CV LOWER VASCULAR LEFT DISTAL FEMORAL SPONT: NORMAL
BH CV LOWER VASCULAR LEFT GASTRONEMIUS COMPRESS: NORMAL
BH CV LOWER VASCULAR LEFT GREATER SAPH AK COMPRESS: NORMAL
BH CV LOWER VASCULAR LEFT GREATER SAPH BK COMPRESS: NORMAL
BH CV LOWER VASCULAR LEFT LESSER SAPH COMPRESS: NORMAL
BH CV LOWER VASCULAR LEFT MID FEMORAL COMPRESS: NORMAL
BH CV LOWER VASCULAR LEFT MID FEMORAL SPONT: NORMAL
BH CV LOWER VASCULAR LEFT PERONEAL AUGMENT: NORMAL
BH CV LOWER VASCULAR LEFT PERONEAL COMPRESS: NORMAL
BH CV LOWER VASCULAR LEFT POPLITEAL COMPRESS: NORMAL
BH CV LOWER VASCULAR LEFT POPLITEAL SPONT: NORMAL
BH CV LOWER VASCULAR LEFT POSTERIOR TIBIAL AUGMENT: NORMAL
BH CV LOWER VASCULAR LEFT POSTERIOR TIBIAL COMPRESS: NORMAL
BH CV LOWER VASCULAR LEFT PROFUNDA FEMORAL SPONT: NORMAL
BH CV LOWER VASCULAR LEFT PROXIMAL FEMORAL COMPRESS: NORMAL
BH CV LOWER VASCULAR LEFT PROXIMAL FEMORAL SPONT: NORMAL
BH CV LOWER VASCULAR LEFT SAPHENOFEMORAL JUNCTION COMPRESS: NORMAL
BH CV LOWER VASCULAR LEFT SAPHENOFEMORAL JUNCTION SPONT: NORMAL
BH CV LOWER VASCULAR RIGHT COMMON FEMORAL PHASIC: NORMAL
BH CV LOWER VASCULAR RIGHT COMMON FEMORAL SPONT: NORMAL
BH CV LOWER VASCULAR RIGHT DISTAL FEMORAL COMPRESS: NORMAL
BH CV LOWER VASCULAR RIGHT DISTAL FEMORAL PHASIC: NORMAL
BH CV LOWER VASCULAR RIGHT DISTAL FEMORAL SPONT: NORMAL
BH CV LOWER VASCULAR RIGHT GASTRONEMIUS COMPRESS: NORMAL
BH CV LOWER VASCULAR RIGHT GREATER SAPH AK COMPRESS: NORMAL
BH CV LOWER VASCULAR RIGHT GREATER SAPH BK COMPRESS: NORMAL
BH CV LOWER VASCULAR RIGHT LESSER SAPH COMPRESS: NORMAL
BH CV LOWER VASCULAR RIGHT MID FEMORAL COMPRESS: NORMAL
BH CV LOWER VASCULAR RIGHT MID FEMORAL PHASIC: NORMAL
BH CV LOWER VASCULAR RIGHT MID FEMORAL SPONT: NORMAL
BH CV LOWER VASCULAR RIGHT PERONEAL AUGMENT: NORMAL
BH CV LOWER VASCULAR RIGHT PERONEAL COMPRESS: NORMAL
BH CV LOWER VASCULAR RIGHT POPLITEAL COMPRESS: NORMAL
BH CV LOWER VASCULAR RIGHT POPLITEAL PHASIC: NORMAL
BH CV LOWER VASCULAR RIGHT POPLITEAL SPONT: NORMAL
BH CV LOWER VASCULAR RIGHT POSTERIOR TIBIAL AUGMENT: NORMAL
BH CV LOWER VASCULAR RIGHT POSTERIOR TIBIAL COMPRESS: NORMAL
BH CV LOWER VASCULAR RIGHT PROFUNDA FEMORAL PHASIC: NORMAL
BH CV LOWER VASCULAR RIGHT PROFUNDA FEMORAL SPONT: NORMAL
BH CV LOWER VASCULAR RIGHT PROXIMAL FEMORAL COMPRESS: NORMAL
BH CV LOWER VASCULAR RIGHT PROXIMAL FEMORAL PHASIC: NORMAL
BH CV LOWER VASCULAR RIGHT PROXIMAL FEMORAL SPONT: NORMAL
BH CV LOWER VASCULAR RIGHT SAPHENOFEMORAL JUNCTION COMPRESS: NORMAL
BH CV LOWER VASCULAR RIGHT SAPHENOFEMORAL JUNCTION PHASIC: NORMAL
BH CV LOWER VASCULAR RIGHT SAPHENOFEMORAL JUNCTION SPONT: NORMAL
BH CV VAS PRELIMINARY FINDINGS SCRIPTING: 1

## 2024-04-24 ENCOUNTER — LAB (OUTPATIENT)
Dept: LAB | Facility: HOSPITAL | Age: 69
End: 2024-04-24
Payer: COMMERCIAL

## 2024-04-24 DIAGNOSIS — R60.1 GENERALIZED EDEMA: ICD-10-CM

## 2024-04-24 LAB
ANION GAP SERPL CALCULATED.3IONS-SCNC: 14 MMOL/L (ref 5–15)
BILIRUB UR QL STRIP: NEGATIVE
BUN SERPL-MCNC: 17 MG/DL (ref 8–23)
BUN/CREAT SERPL: 15.2 (ref 7–25)
CALCIUM SPEC-SCNC: 9.3 MG/DL (ref 8.6–10.5)
CHLORIDE SERPL-SCNC: 101 MMOL/L (ref 98–107)
CLARITY UR: CLEAR
CO2 SERPL-SCNC: 25 MMOL/L (ref 22–29)
COLOR UR: YELLOW
CREAT SERPL-MCNC: 1.12 MG/DL (ref 0.57–1)
EGFRCR SERPLBLD CKD-EPI 2021: 53.7 ML/MIN/1.73
GLUCOSE SERPL-MCNC: 109 MG/DL (ref 65–99)
GLUCOSE UR STRIP-MCNC: NEGATIVE MG/DL
HGB UR QL STRIP.AUTO: NEGATIVE
KETONES UR QL STRIP: NEGATIVE
LEUKOCYTE ESTERASE UR QL STRIP.AUTO: NEGATIVE
MYCOBACTERIUM SPEC CULT: NORMAL
NIGHT BLUE STAIN TISS: NORMAL
NITRITE UR QL STRIP: NEGATIVE
PH UR STRIP.AUTO: 7.5 [PH] (ref 5–8)
POTASSIUM SERPL-SCNC: 3.8 MMOL/L (ref 3.5–5.2)
PROT UR QL STRIP: NEGATIVE
SODIUM SERPL-SCNC: 140 MMOL/L (ref 136–145)
SP GR UR STRIP: <=1.005 (ref 1–1.03)
UROBILINOGEN UR QL STRIP: NORMAL

## 2024-04-24 PROCEDURE — 36415 COLL VENOUS BLD VENIPUNCTURE: CPT

## 2024-04-24 PROCEDURE — 84165 PROTEIN E-PHORESIS SERUM: CPT

## 2024-04-24 PROCEDURE — 80048 BASIC METABOLIC PNL TOTAL CA: CPT

## 2024-04-24 PROCEDURE — 84155 ASSAY OF PROTEIN SERUM: CPT

## 2024-04-24 PROCEDURE — 81001 URINALYSIS AUTO W/SCOPE: CPT

## 2024-04-24 PROCEDURE — 82570 ASSAY OF URINE CREATININE: CPT

## 2024-04-24 PROCEDURE — 82043 UR ALBUMIN QUANTITATIVE: CPT

## 2024-04-25 LAB
ALBUMIN UR-MCNC: <1.2 MG/DL
BACTERIA UR QL AUTO: NORMAL /HPF
CREAT UR-MCNC: 14.8 MG/DL
HYALINE CASTS UR QL AUTO: NORMAL /LPF
MICROALBUMIN/CREAT UR: NORMAL MG/G{CREAT}
RBC # UR STRIP: NORMAL /HPF
REF LAB TEST METHOD: NORMAL
SQUAMOUS #/AREA URNS HPF: NORMAL /HPF
WBC # UR STRIP: NORMAL /HPF

## 2024-04-26 LAB
ALBUMIN SERPL ELPH-MCNC: 3.6 G/DL (ref 2.9–4.4)
ALBUMIN/GLOB SERPL: 1.1 {RATIO} (ref 0.7–1.7)
ALPHA1 GLOB SERPL ELPH-MCNC: 0.3 G/DL (ref 0–0.4)
ALPHA2 GLOB SERPL ELPH-MCNC: 1 G/DL (ref 0.4–1)
B-GLOBULIN SERPL ELPH-MCNC: 1 G/DL (ref 0.7–1.3)
GAMMA GLOB SERPL ELPH-MCNC: 1 G/DL (ref 0.4–1.8)
GLOBULIN SER CALC-MCNC: 3.3 G/DL (ref 2.2–3.9)
LABORATORY COMMENT REPORT: NORMAL
M PROTEIN SERPL ELPH-MCNC: NORMAL G/DL
PROT PATTERN SERPL ELPH-IMP: NORMAL
PROT SERPL-MCNC: 6.9 G/DL (ref 6–8.5)

## 2024-04-27 ENCOUNTER — TELEPHONE (OUTPATIENT)
Dept: INTERNAL MEDICINE | Facility: CLINIC | Age: 69
End: 2024-04-27
Payer: COMMERCIAL

## 2024-05-01 ENCOUNTER — TELEPHONE (OUTPATIENT)
Dept: INTERNAL MEDICINE | Facility: CLINIC | Age: 69
End: 2024-05-01
Payer: COMMERCIAL

## 2024-05-01 LAB
MYCOBACTERIUM SPEC CULT: NORMAL
NIGHT BLUE STAIN TISS: NORMAL

## 2024-05-01 NOTE — TELEPHONE ENCOUNTER
Patient notified via voice mail message.  Advised her to call back and set up a 2 week follow up appt.

## 2024-05-01 NOTE — TELEPHONE ENCOUNTER
Caller: Agustina Reyes    Relationship: Self    Best call back number: [phone]     What is the best time to reach you: ANY    Who are you requesting to speak with (clinical staff, provider,  specific staff member): DR. MUNOZ OR HIS NURSE    What was the call regarding: THE PATIENT WOULD LIKE A CALLBACK REGARDING SOME OF THE LAB SHE HAD DONE. HER QUESTIONS ARE BELOW.  PLEASE CALL HER BACK ASAP.     ONE TEST SHOWED A BLOOD CLOTT IN LEG AND SHE WAS PUT ON ELOQUIS. SHOULD SHE CONTINUE TO TAKE THE ASPRIN EVERYDAY? IS IT OK IF SHE FLIES? DOES THE CLOTT TO DISSOLVE ON IT'S OWN?    THE TESTS FOR KIDNEY FUNCTION SHOWED THAT PROTEINS IN HER URINE WERE NOT HIGH. DOES THE TEST SHOW ANY SIGNS OF KIDNEY FAILURE?    THE BLOOD TESTS ALSO SHOWED THAT HER GLUCOSE WAS HIGH, SHOULD SHE BE CONCERNED?    Is it okay if the provider responds through MyChart: NO

## 2024-05-01 NOTE — TELEPHONE ENCOUNTER
Pt is concerned about the swelling because it is in both legs. Should she be concerned about kidney failure or heart failure?

## 2024-05-01 NOTE — TELEPHONE ENCOUNTER
Please see comments in labs, this has been addressed.  I wasn't worried about the glucose because she was not fasting.

## 2024-05-01 NOTE — TELEPHONE ENCOUNTER
Her kidney function is slightly reduced but is nowhere near kidney failure.  When I saw her last week there was certainly no evidence of heart failure.  Suggest that she come in to see me in about 2 weeks.  That way she will have been on the blood thinner long enough to stabilize the blood clot and we can look at both legs.

## 2024-05-08 LAB
MYCOBACTERIUM SPEC CULT: NORMAL
NIGHT BLUE STAIN TISS: NORMAL

## 2024-05-13 RX ORDER — LOSARTAN POTASSIUM 100 MG/1
100 TABLET ORAL DAILY
Qty: 90 TABLET | Refills: 3 | Status: SHIPPED | OUTPATIENT
Start: 2024-05-13 | End: 2024-05-20 | Stop reason: SDUPTHER

## 2024-05-15 ENCOUNTER — OFFICE VISIT (OUTPATIENT)
Dept: INTERNAL MEDICINE | Facility: CLINIC | Age: 69
End: 2024-05-15
Payer: COMMERCIAL

## 2024-05-15 VITALS
DIASTOLIC BLOOD PRESSURE: 60 MMHG | HEART RATE: 68 BPM | SYSTOLIC BLOOD PRESSURE: 150 MMHG | HEIGHT: 66 IN | BODY MASS INDEX: 32.75 KG/M2 | TEMPERATURE: 98 F | WEIGHT: 203.8 LBS

## 2024-05-15 DIAGNOSIS — M70.62 TROCHANTERIC BURSITIS OF BOTH HIPS: ICD-10-CM

## 2024-05-15 DIAGNOSIS — I87.2 VENOUS INSUFFICIENCY: ICD-10-CM

## 2024-05-15 DIAGNOSIS — J47.9 BRONCHIECTASIS WITHOUT COMPLICATION: ICD-10-CM

## 2024-05-15 DIAGNOSIS — M70.61 TROCHANTERIC BURSITIS OF BOTH HIPS: ICD-10-CM

## 2024-05-15 DIAGNOSIS — I82.411 ACUTE DEEP VEIN THROMBOSIS (DVT) OF FEMORAL VEIN OF RIGHT LOWER EXTREMITY: Primary | ICD-10-CM

## 2024-05-15 PROCEDURE — 99214 OFFICE O/P EST MOD 30 MIN: CPT | Performed by: INTERNAL MEDICINE

## 2024-05-15 PROCEDURE — 20610 DRAIN/INJ JOINT/BURSA W/O US: CPT | Performed by: INTERNAL MEDICINE

## 2024-05-15 RX ORDER — TRIAMCINOLONE ACETONIDE 40 MG/ML
40 INJECTION, SUSPENSION INTRA-ARTICULAR; INTRAMUSCULAR ONCE
Status: COMPLETED | OUTPATIENT
Start: 2024-05-15 | End: 2024-05-15

## 2024-05-15 RX ADMIN — TRIAMCINOLONE ACETONIDE 40 MG: 40 INJECTION, SUSPENSION INTRA-ARTICULAR; INTRAMUSCULAR at 15:03

## 2024-05-15 RX ADMIN — TRIAMCINOLONE ACETONIDE 40 MG: 40 INJECTION, SUSPENSION INTRA-ARTICULAR; INTRAMUSCULAR at 15:04

## 2024-05-15 NOTE — PROGRESS NOTES
[facility]     Agustina Reyes  1955   [id number]      Patient Care Team:  Rich Zaragoza MD as PCP - General (Internal Medicine)  Rich Zaragoza MD as PCP - Internal Medicine (Internal Medicine)  China Ivey APRN as Referring Physician (Neurology)  Paco Bernal MD as Consulting Physician (Cardiology)  Eusebia Gomes APRN as Nurse Practitioner (Pulmonary Disease)  Dc, Aria FARAH DO as Consulting Physician (Pulmonary Disease)    Chief Complaint::   Chief Complaint   Patient presents with    Leg Swelling     Both         HPI  The patient is a 68-year-old female who comes in complaining of bilateral swelling of the lower extremities and for follow-up DVT.    The patient reports bilateral lower extremity edema, with the right lower extremity being more affected than the left. She has been managing the edema with compression knee socks, which she wears approximately 4 out of 5 days. While these socks provide some relief, her lower extremities do not feel as fatigued, and the edema has lessened. She has scheduled an appointment with a vascular specialist in 2 weeks and is inquiring about the possibility of soaking her lower extremities in Epsom salts. Her dietary habits include a daily oat bar and occasional consumption of kombucha tea. Her daily Eliquis intake varies due to her work schedule. She is also taking chlorthalidone and is inquiring about the possibility of increasing the dosage.    The patient has been experiencing a persistent cough since 02/2024, with expectoration of green phlegm at least once or twice daily. She experienced wheezing on 05/14/2024, for which she used her inhaler. A culture was performed, the results of which are not yet available. She attributes her cough to drainage.    The patient was diagnosed with hip bursitis in 06/2023, which has since recurred. She received cortisone injections in both hips in 06/2023, but is uncertain of  their efficacy. She experienced a fall from a chair in 07/2023, which resulted in a sedentary lifestyle for 8 to 9 months. Currently, she walks at least 2 miles daily at her workplace, but experiences a burning sensation in her hips by the time she reaches her car. She is inquiring about the possibility of receiving another cortisone injection.    She has stage 1 kidney disease.    Chronic Conditions:        Patient Active Problem List   Diagnosis    Occipital neuralgia of left side    Hypertension    Migraine with aura and without status migrainosus, not intractable    Hyperlipidemia    Occlusion and stenosis of left posterior cerebral artery    History of CVA (cerebrovascular accident)    Prediabetes    Stage 2 moderate COPD by GOLD classification    Abnormal CT of the chest    Non-seasonal allergic rhinitis    Gastroesophageal reflux disease with esophagitis without hemorrhage    Other acute sinusitis    Incidental lung nodule, greater than or equal to 8mm    Shortness of breath    Nasal congestion        Past Medical History:   Diagnosis Date    Cataract 2017    COPD (chronic obstructive pulmonary disease) May2020    Deep vein thrombosis April2024    GERD (gastroesophageal reflux disease)     Hyperlipidemia     Hypertension     Migraines     Occipital neuralgia of left side     Pneumonia     Shingles     Stroke        Past Surgical History:   Procedure Laterality Date    CATARACT EXTRACTION      COLONOSCOPY      HYSTERECTOMY  2000    SUBTOTAL HYSTERECTOMY         Family History   Problem Relation Age of Onset    Heart disease Mother     Stroke Mother     Hypertension Mother     Heart disease Father     Hypertension Father     Stroke Maternal Grandfather     No Known Problems Sister     Breast cancer Neg Hx     Ovarian cancer Neg Hx        Social History     Socioeconomic History    Marital status: Single   Tobacco Use    Smoking status: Former     Current packs/day: 0.00     Average packs/day: 1 pack/day for  40.0 years (40.0 ttl pk-yrs)     Types: Cigarettes     Start date: 1974     Quit date: 2014     Years since quittin.7    Smokeless tobacco: Never   Vaping Use    Vaping status: Never Used   Substance and Sexual Activity    Alcohol use: Yes     Comment: occ.    Drug use: No    Sexual activity: Defer       Allergies   Allergen Reactions    Erythromycin GI Intolerance     Nausea      Amlodipine Other (See Comments)     Pt states she does not remember an allergy to this medicine, states her legs were swelling    Lisinopril Other (See Comments)     Joint pain    Sulfa Antibiotics Unknown (See Comments)     Pt's home pharmacy has sulfa drugs listed as an allergy.         Current Outpatient Medications:     albuterol sulfate  (90 Base) MCG/ACT inhaler, Inhale 2 puffs Every 4 (Four) Hours As Needed for Wheezing., Disp: 18 g, Rfl: 5    apixaban (ELIQUIS) 5 MG tablet tablet, Take 1 tablet by mouth 2 (Two) Times a Day., Disp: 60 tablet, Rfl: 2    aspirin 81 MG tablet, Take 1 tablet by mouth Daily., Disp: , Rfl:     atorvastatin (LIPITOR) 40 MG tablet, Take 1 tablet by mouth Daily., Disp: 90 tablet, Rfl: 3    Budeson-Glycopyrrol-Formoterol (Breztri Aerosphere) 160-9-4.8 MCG/ACT aerosol inhaler, Inhale 2 puffs 2 (Two) Times a Day., Disp: 3 each, Rfl: 3    butalbital-acetaminophen  MG tablet tablet, Take 1-2 tablets by mouth Every 6 (Six) Hours As Needed (migraines)., Disp: 15 each, Rfl: 0    chlorthalidone (HYGROTON) 25 MG tablet, TAKE 1 TABLET BY MOUTH DAILY, Disp: 90 tablet, Rfl: 3    losartan (COZAAR) 100 MG tablet, TAKE 1 TABLET BY MOUTH DAILY, Disp: 90 tablet, Rfl: 3    prochlorperazine (COMPAZINE) 5 MG tablet, Take 1-2 tablets by mouth Every 6 (Six) Hours As Needed for Nausea or Vomiting (migraine)., Disp: 20 tablet, Rfl: 5    verapamil SR (CALAN-SR) 240 MG CR tablet, TAKE 1 TABLET BY MOUTH EVERY NIGHT, Disp: 90 tablet, Rfl: 3  No current facility-administered medications for this visit.    Review  "of Systems is otherwise unremarkable.    Vital Signs  Vitals:    05/15/24 1244   BP: 150/60   BP Location: Left arm   Patient Position: Sitting   Cuff Size: Large Adult   Pulse: 68   Temp: 98 °F (36.7 °C)   TempSrc: Temporal   Weight: 92.4 kg (203 lb 12.8 oz)   Height: 168.9 cm (66.5\")       Physical Exam  Vitals reviewed.   Constitutional:       Appearance: Normal appearance. She is well-developed.   HENT:      Head: Normocephalic and atraumatic.      Nose: Nose normal.   Eyes:      General: Lids are normal.      Conjunctiva/sclera: Conjunctivae normal.      Pupils: Pupils are equal, round, and reactive to light.   Neck:      Thyroid: No thyromegaly.      Trachea: Trachea normal.   Cardiovascular:      Rate and Rhythm: Normal rate and regular rhythm.      Heart sounds: Normal heart sounds.   Pulmonary:      Effort: Pulmonary effort is normal. No respiratory distress.      Breath sounds: Normal breath sounds.   Musculoskeletal:      Comments: There is trace pretibial edema bilaterally. There is no calf tenderness. There is tenderness over the trochanteric bursa bilaterally.   Skin:     General: Skin is warm and dry.   Neurological:      Mental Status: She is alert and oriented to person, place, and time.   Psychiatric:         Attention and Perception: Attention normal.         Mood and Affect: Mood normal.         Speech: Speech normal.         Behavior: Behavior normal. Behavior is cooperative.          Procedures    Bursa Injection:    Skin over right greater trochanter cleansed with alcohol wipe.  40 mg triamcinolone 1 ml lidocaine injected into bursa without problem    Skin over left greater trochanter cleansed with alcohol wipe.  40 mg triamcinolone 1 ml lidocaine injected into bursa without problem.     ACE III MINI             Assessment/Plan:    Diagnoses and all orders for this visit:    1. Acute deep vein thrombosis (DVT) of femoral vein of right lower extremity (Primary)    2. Trochanteric bursitis of " both hips  -     triamcinolone acetonide (KENALOG-40) injection 40 mg  -     triamcinolone acetonide (KENALOG-40) injection 40 mg    3. Venous insufficiency    4. Bronchiectasis without complication    1. Deep Vein Thrombosis (DVT) on the right side.  - The patient's lower extremities now exhibit equal edema. The patient will persist with her current regimen of apixaban for an additional 2 months, after which a repeat venous Doppler will be conducted.    2. Trochanteric bursitis, affecting both hips.  - Both hips were successfully injected with triamcinolone.    3. Bilateral venous insufficiency.  - Once again, I emphasized the necessity of compression, salt restriction, and elevation. Diuretics do not have a role in this condition.    4. Cough.  - The patient's pulmonologist will contact her regarding the next step to work up her abnormal CT scan.    Follow-up  The patient is scheduled for a follow-up appointment with me.      Plan of care reviewed with patient at the conclusion of today's visit. Education was provided regarding diagnosis, management, and any prescribed or recommended OTC medications.Patient verbalizes understanding of and agreement with management plan.         Rich Zaragoza MD       Transcribed from ambient dictation for Rich Zaragoza MD by Debbie Palomino.  05/15/24   13:54 EDT    Patient or patient representative verbalized consent to the visit recording.  I have personally performed the services described in this document as transcribed by the above individual, and it is both accurate and complete.

## 2024-05-20 RX ORDER — LOSARTAN POTASSIUM 100 MG/1
100 TABLET ORAL DAILY
Qty: 90 TABLET | Refills: 3 | Status: SHIPPED | OUTPATIENT
Start: 2024-05-20

## 2024-06-05 ENCOUNTER — OFFICE VISIT (OUTPATIENT)
Dept: PULMONOLOGY | Facility: CLINIC | Age: 69
End: 2024-06-05
Payer: COMMERCIAL

## 2024-06-05 VITALS
TEMPERATURE: 97.9 F | SYSTOLIC BLOOD PRESSURE: 130 MMHG | DIASTOLIC BLOOD PRESSURE: 80 MMHG | BODY MASS INDEX: 31.34 KG/M2 | WEIGHT: 199.7 LBS | OXYGEN SATURATION: 98 % | HEIGHT: 67 IN | HEART RATE: 68 BPM

## 2024-06-05 DIAGNOSIS — R91.1 INCIDENTAL LUNG NODULE, GREATER THAN OR EQUAL TO 8MM: Primary | ICD-10-CM

## 2024-06-05 DIAGNOSIS — J44.9 STAGE 2 MODERATE COPD BY GOLD CLASSIFICATION: ICD-10-CM

## 2024-06-05 PROCEDURE — 99214 OFFICE O/P EST MOD 30 MIN: CPT | Performed by: INTERNAL MEDICINE

## 2024-06-05 RX ORDER — BUDESONIDE, GLYCOPYRROLATE, AND FORMOTEROL FUMARATE 160; 9; 4.8 UG/1; UG/1; UG/1
2 AEROSOL, METERED RESPIRATORY (INHALATION) 2 TIMES DAILY
Qty: 3 EACH | Refills: 3 | Status: SHIPPED | OUTPATIENT
Start: 2024-06-05

## 2024-06-05 NOTE — PROGRESS NOTES
"Pulmonary Office Follow Up      Subjective   Chief Complaint: Shortness of Breath    Agustina Reyes is a 68 y.o. female is being seen in follow up for COPD    History of Present Illness    Ms. Reyes is a 69yo F who is followed for COPD and an abnormal CT scan of the chest. She was last seen in clinic on 2/29/24.    She returns to clinic today for follow up.     Since her last visit, she was treated with several rounds of antibiotics and finally had steroids for hip bursitis with improvement in her symptoms. She was also diagnosed with a DVT and has been started on Eliquis.       The following portions of the patient's history were reviewed and updated as appropriate: allergies, current medications, past family history, past medical history, past social history, past surgical history and problem list.    Review of Systems   Constitutional: Negative.    HENT:  Positive for sinus pressure and sinus pain.    Eyes: Negative.    Cardiovascular: Negative.    Gastrointestinal: Negative.    Endocrine: Negative.    Genitourinary: Negative.    Musculoskeletal: Negative.    Skin: Negative.    Allergic/Immunologic: Negative.    Neurological: Negative.    Hematological: Negative.    Psychiatric/Behavioral: Negative.            Objective   Blood pressure 130/80, pulse 68, temperature 97.9 °F (36.6 °C), height 168.9 cm (66.5\"), weight 90.6 kg (199 lb 11.2 oz), SpO2 98%, not currently breastfeeding.  Physical Exam  Vitals and nursing note reviewed.   Constitutional:       General: She is not in acute distress.     Appearance: She is well-developed.   HENT:      Head: Normocephalic and atraumatic.   Eyes:      General: No scleral icterus.     Conjunctiva/sclera: Conjunctivae normal.      Pupils: Pupils are equal, round, and reactive to light.   Neck:      Thyroid: No thyromegaly.      Trachea: No tracheal deviation.   Cardiovascular:      Rate and Rhythm: Normal rate and regular rhythm.      Heart sounds: Normal heart sounds. "   Pulmonary:      Effort: Pulmonary effort is normal. No respiratory distress.      Breath sounds: Normal breath sounds.   Abdominal:      General: Bowel sounds are normal.      Palpations: Abdomen is soft.      Tenderness: There is no abdominal tenderness.   Musculoskeletal:         General: Normal range of motion.      Cervical back: Normal range of motion and neck supple.   Lymphadenopathy:      Cervical: No cervical adenopathy.   Skin:     General: Skin is warm and dry.      Findings: No erythema or rash.   Neurological:      Mental Status: She is alert and oriented to person, place, and time.      Motor: No abnormal muscle tone.      Coordination: Coordination normal.   Psychiatric:         Speech: Speech normal.         Behavior: Behavior normal.         Judgment: Judgment normal.         PFTs:  No new PFTs.    Imaging:  CT Chest from 3/14/24 showed widespread peribronchial micronodularity and reticular nodular densities and patchy groundglass opacities as well as chronic consolidation and bronchiectasis in the base of the right middle lobe and lingula. There are multiple new bilateral centimeter and subcentimeter sized nodules which are indeterminate.     Assessment & Plan   Diagnoses and all orders for this visit:    1. Incidental lung nodule, greater than or equal to 8mm (Primary)  -     CT Chest Without Contrast; Future    2. Stage 2 moderate COPD by GOLD classification  -     Budeson-Glycopyrrol-Formoterol (Breztri Aerosphere) 160-9-4.8 MCG/ACT aerosol inhaler; Inhale 2 puffs 2 (Two) Times a Day.  Dispense: 3 each; Refill: 3            Discussion:  Ms. Reyes is a 69yo F who is followed for COPD and Pulmonary Nodules     1. Moderate COPD  - Continue Breztri. Refill sent.   - Continue Albuterol as needed.      2. Abnormal CT Chest  - Tree-in-bud nodularity first noticed in May 2021.   - CT chest from 3/14/24 with worsening nodularity. We will obtain a repeat CT scan of the chest now. We discussed that if  the nodularity is still present/worsening that we should get her scheduled for a bronchoscopy with BAL.      3. Allergic Rhinitis/Sinusitis  - Continue OTC antihistamine   - Continue Mucinex.   - Nasal sinus rinses instead.      4. GERD  - Elevate the head of the bed.   - Do not eat within 2-3 hours of bedtime when possible.     Follow up in 4-6 months.        Agustina Reyes  reports that she quit smoking about 9 years ago. Her smoking use included cigarettes. She started smoking about 49 years ago. She has a 40 pack-year smoking history. She has never used smokeless tobacco.        Aria MARRERO Case, DO  Pulmonary and Critical Care Medicine  Note Electronically Signed

## 2024-06-14 ENCOUNTER — HOSPITAL ENCOUNTER (OUTPATIENT)
Facility: HOSPITAL | Age: 69
Discharge: HOME | End: 2024-06-14
Admitting: INTERNAL MEDICINE
Payer: COMMERCIAL

## 2024-06-14 DIAGNOSIS — R91.1 INCIDENTAL LUNG NODULE, GREATER THAN OR EQUAL TO 8MM: ICD-10-CM

## 2024-06-14 PROCEDURE — 71250 CT THORAX DX C-: CPT

## 2024-07-24 ENCOUNTER — TELEPHONE (OUTPATIENT)
Dept: PULMONOLOGY | Facility: CLINIC | Age: 69
End: 2024-07-24
Payer: COMMERCIAL

## 2024-07-24 NOTE — TELEPHONE ENCOUNTER
"Patient called with concerns of her CT scan results, and why she would still be coughing, but didn't receive a call with results. She states that she had \"some sort of bacteria\", which is why the CT was ordered, and she is concerned. Please advise.  "

## 2024-07-26 RX ORDER — APIXABAN 5 MG/1
5 TABLET, FILM COATED ORAL 2 TIMES DAILY
Qty: 60 TABLET | Refills: 2 | Status: SHIPPED | OUTPATIENT
Start: 2024-07-26

## 2024-07-26 NOTE — PROGRESS NOTES
I called Dr Dc Fernandes would like to proceed with a bronchoscopy.  She is agreeable at this time.  She is currently on Eliquis, but has an appointment on the 30th and may be stopping the medication.  We did discuss holding it for 3 days prior to the procedure if not.

## 2024-07-31 ENCOUNTER — PREP FOR SURGERY (OUTPATIENT)
Dept: OTHER | Facility: HOSPITAL | Age: 69
End: 2024-07-31
Payer: COMMERCIAL

## 2024-07-31 ENCOUNTER — OFFICE VISIT (OUTPATIENT)
Dept: INTERNAL MEDICINE | Facility: CLINIC | Age: 69
End: 2024-07-31
Payer: COMMERCIAL

## 2024-07-31 VITALS
WEIGHT: 200.6 LBS | HEIGHT: 67 IN | BODY MASS INDEX: 31.48 KG/M2 | TEMPERATURE: 98 F | SYSTOLIC BLOOD PRESSURE: 132 MMHG | DIASTOLIC BLOOD PRESSURE: 62 MMHG | OXYGEN SATURATION: 96 % | HEART RATE: 79 BPM

## 2024-07-31 DIAGNOSIS — I82.4Y1 ACUTE DEEP VEIN THROMBOSIS (DVT) OF PROXIMAL VEIN OF RIGHT LOWER EXTREMITY: Primary | ICD-10-CM

## 2024-07-31 DIAGNOSIS — R91.1 INCIDENTAL LUNG NODULE, GREATER THAN OR EQUAL TO 8MM: Primary | ICD-10-CM

## 2024-07-31 PROCEDURE — 99213 OFFICE O/P EST LOW 20 MIN: CPT | Performed by: INTERNAL MEDICINE

## 2024-07-31 RX ORDER — SODIUM CHLORIDE 9 MG/ML
125 INJECTION, SOLUTION INTRAVENOUS CONTINUOUS
OUTPATIENT
Start: 2024-07-31

## 2024-07-31 RX ORDER — LIDOCAINE HYDROCHLORIDE 40 MG/ML
4 INJECTION, SOLUTION RETROBULBAR; TOPICAL ONCE
OUTPATIENT
Start: 2024-07-31 | End: 2024-07-31

## 2024-07-31 RX ORDER — SODIUM CHLORIDE 0.9 % (FLUSH) 0.9 %
3 SYRINGE (ML) INJECTION EVERY 12 HOURS SCHEDULED
OUTPATIENT
Start: 2024-07-31

## 2024-07-31 RX ORDER — SODIUM CHLORIDE 0.9 % (FLUSH) 0.9 %
10 SYRINGE (ML) INJECTION AS NEEDED
OUTPATIENT
Start: 2024-07-31

## 2024-07-31 RX ORDER — SODIUM CHLORIDE 9 MG/ML
40 INJECTION, SOLUTION INTRAVENOUS AS NEEDED
OUTPATIENT
Start: 2024-07-31

## 2024-07-31 NOTE — PROGRESS NOTES
[facility]     Agustina Reyes  1955   [id number]      Patient Care Team:  Rich Zaragoza MD as PCP - General (Internal Medicine)  Rich Zaragoza MD as PCP - Internal Medicine (Internal Medicine)  China Ivey APRN as Referring Physician (Neurology)  Paco Bernal MD as Consulting Physician (Cardiology)  Eusebia Gomes APRN as Nurse Practitioner (Pulmonary Disease)  Aria Irwin DO as Consulting Physician (Pulmonary Disease)    Chief Complaint::   Chief Complaint   Patient presents with    DVT     F/u        HPI  History of Present Illness  The patient is a 68-year-old female who comes in for follow-up of DVT. She was found to have a right proximal femoral DVT in 04/2023 and was placed on apixaban. She is now here for follow-up.    The patient reports experiencing minimal swelling in her leg, particularly after prolonged periods of standing, such as 9 hours. The swelling is more pronounced at the site of her previous surgery, with persistent swelling in certain areas. Currently, the swelling extends to her ankles. She engages in compression during the winter season for comfort. The swelling subsided significantly after approximately 4 days of Eliquis treatment. Currently, she is on a daily regimen of aspirin.    Supplemental Information  She is supposed to have a bronchoscopy with Dr. Irwin because they think there is some kind of bacteria in her lungs. They called her last week and told her that they wanted to do it this coming week, but she has not heard from them. Now it is going to be the week after. She was told that she needs to go off the Eliquis for 3 days prior to the procedure.      Chronic Conditions:      Patient Active Problem List   Diagnosis    Occipital neuralgia of left side    Hypertension    Migraine with aura and without status migrainosus, not intractable    Hyperlipidemia    Occlusion and stenosis of left posterior cerebral artery     History of CVA (cerebrovascular accident)    Prediabetes    Stage 2 moderate COPD by GOLD classification    Abnormal CT of the chest    Non-seasonal allergic rhinitis    Gastroesophageal reflux disease with esophagitis without hemorrhage    Other acute sinusitis    Incidental lung nodule, greater than or equal to 8mm    Shortness of breath    Nasal congestion        Past Medical History:   Diagnosis Date    Cataract 2017    COPD (chronic obstructive pulmonary disease) May2020    Deep vein thrombosis April2024    GERD (gastroesophageal reflux disease)     Hyperlipidemia     Hypertension     Migraines     Occipital neuralgia of left side     Pneumonia     Shingles     Stroke        Past Surgical History:   Procedure Laterality Date    CATARACT EXTRACTION      COLONOSCOPY      HYSTERECTOMY  2000    SUBTOTAL HYSTERECTOMY      UMBILICAL HERNIA REPAIR  Aug1955       Family History   Problem Relation Age of Onset    Heart disease Mother     Stroke Mother     Hypertension Mother     Heart disease Father     Hypertension Father     Stroke Maternal Grandfather     No Known Problems Sister     Breast cancer Neg Hx     Ovarian cancer Neg Hx        Social History     Socioeconomic History    Marital status: Single   Tobacco Use    Smoking status: Former     Current packs/day: 0.00     Average packs/day: 1 pack/day for 40.0 years (40.0 ttl pk-yrs)     Types: Cigarettes     Start date: 8/4/1974     Quit date: 8/4/2014     Years since quitting: 10.0    Smokeless tobacco: Never   Vaping Use    Vaping status: Never Used   Substance and Sexual Activity    Alcohol use: Yes     Comment: occ.    Drug use: No    Sexual activity: Defer       Allergies   Allergen Reactions    Erythromycin GI Intolerance     Nausea      Amlodipine Other (See Comments)     Pt states she does not remember an allergy to this medicine, states her legs were swelling    Lisinopril Other (See Comments)     Joint pain    Sulfa Antibiotics Unknown (See Comments)      "Pt's home pharmacy has sulfa drugs listed as an allergy.         Current Outpatient Medications:     albuterol sulfate  (90 Base) MCG/ACT inhaler, Inhale 2 puffs Every 4 (Four) Hours As Needed for Wheezing., Disp: 18 g, Rfl: 5    aspirin 81 MG tablet, Take 1 tablet by mouth Daily., Disp: , Rfl:     atorvastatin (LIPITOR) 40 MG tablet, Take 1 tablet by mouth Daily., Disp: 90 tablet, Rfl: 3    Budeson-Glycopyrrol-Formoterol (Breztri Aerosphere) 160-9-4.8 MCG/ACT aerosol inhaler, Inhale 2 puffs 2 (Two) Times a Day., Disp: 3 each, Rfl: 3    butalbital-acetaminophen  MG tablet tablet, Take 1-2 tablets by mouth Every 6 (Six) Hours As Needed (migraines)., Disp: 15 each, Rfl: 0    chlorthalidone (HYGROTON) 25 MG tablet, TAKE 1 TABLET BY MOUTH DAILY, Disp: 90 tablet, Rfl: 3    Eliquis 5 MG tablet tablet, TAKE 1 TABLET BY MOUTH TWICE DAILY, Disp: 60 tablet, Rfl: 2    losartan (COZAAR) 100 MG tablet, Take 1 tablet by mouth Daily., Disp: 90 tablet, Rfl: 3    prochlorperazine (COMPAZINE) 5 MG tablet, Take 1-2 tablets by mouth Every 6 (Six) Hours As Needed for Nausea or Vomiting (migraine)., Disp: 20 tablet, Rfl: 5    verapamil SR (CALAN-SR) 240 MG CR tablet, TAKE 1 TABLET BY MOUTH EVERY NIGHT, Disp: 90 tablet, Rfl: 3    Review of Systems   Constitutional: Negative.    Respiratory: Negative.  Negative for chest tightness and shortness of breath.    Cardiovascular: Negative.  Negative for chest pain.   Gastrointestinal:  Negative for abdominal pain, blood in stool, constipation and diarrhea.        Vital Signs  Vitals:    07/31/24 1132   BP: 132/62   BP Location: Left arm   Patient Position: Sitting   Cuff Size: Adult   Pulse: 79   Temp: 98 °F (36.7 °C)   TempSrc: Infrared   SpO2: 96%   Weight: 91 kg (200 lb 9.6 oz)   Height: 168.9 cm (66.5\")   PainSc: 0-No pain       Physical Exam  Vitals reviewed.   Constitutional:       Appearance: She is well-developed.   HENT:      Head: Normocephalic and atraumatic. "   Cardiovascular:      Rate and Rhythm: Normal rate and regular rhythm.      Heart sounds: Normal heart sounds. No murmur heard.  Pulmonary:      Effort: Pulmonary effort is normal.      Breath sounds: Normal breath sounds.   Musculoskeletal:      Comments: Trace ankle edema bilaterally   Neurological:      Mental Status: She is alert and oriented to person, place, and time.        Physical Exam      Procedures    ACE III MINI        Results             Assessment/Plan:    Diagnoses and all orders for this visit:    1. Acute deep vein thrombosis (DVT) of proximal vein of right lower extremity (Primary)  -     Duplex Venous Lower Extremity - Right CAR; Future      Assessment & Plan  1. Deep vein thrombosis, right proximal femoral vein and popliteal.  The patient's symptoms have shown significant improvement following a 3-month course of apixaban. A repeat venous Doppler will be conducted, with the understanding that if the clot has resolved, the medication will be discontinued. Should the clot not resolve, the current treatment plan will be continued for an additional 3 months, after which a reassessment will be conducted.      Plan of care reviewed with patient at the conclusion of today's visit. Education was provided regarding diagnosis, management, and any prescribed or recommended OTC medications.Patient verbalizes understanding of and agreement with management plan.     Patient or patient representative verbalized consent for the use of Ambient Listening during the visit with  Rich Zaragoza MD for chart documentation. 8/1/2024  09:38 EDT        Rich Zaragoza MD

## 2024-08-15 ENCOUNTER — CLINICAL SUPPORT (OUTPATIENT)
Dept: PULMONOLOGY | Facility: CLINIC | Age: 69
End: 2024-08-15
Payer: COMMERCIAL

## 2024-08-15 DIAGNOSIS — R91.1 INCIDENTAL LUNG NODULE, GREATER THAN OR EQUAL TO 8MM: ICD-10-CM

## 2024-08-15 LAB
APTT PPP: 37.3 SECONDS (ref 22–39)
INR PPP: 1.48 (ref 0.89–1.12)
PROTHROMBIN TIME: 18 SECONDS (ref 12.2–14.5)

## 2024-08-15 PROCEDURE — 80053 COMPREHEN METABOLIC PANEL: CPT | Performed by: INTERNAL MEDICINE

## 2024-08-15 PROCEDURE — 85610 PROTHROMBIN TIME: CPT | Performed by: INTERNAL MEDICINE

## 2024-08-15 PROCEDURE — 85730 THROMBOPLASTIN TIME PARTIAL: CPT | Performed by: INTERNAL MEDICINE

## 2024-08-15 PROCEDURE — 85025 COMPLETE CBC W/AUTO DIFF WBC: CPT | Performed by: INTERNAL MEDICINE

## 2024-08-16 LAB
ALBUMIN SERPL-MCNC: 4 G/DL (ref 3.5–5.2)
ALBUMIN/GLOB SERPL: 1.5 G/DL
ALP SERPL-CCNC: 72 U/L (ref 39–117)
ALT SERPL W P-5'-P-CCNC: 13 U/L (ref 1–33)
ANION GAP SERPL CALCULATED.3IONS-SCNC: 11.3 MMOL/L (ref 5–15)
AST SERPL-CCNC: 15 U/L (ref 1–32)
BASOPHILS # BLD AUTO: 0.08 10*3/MM3 (ref 0–0.2)
BASOPHILS NFR BLD AUTO: 1 % (ref 0–1.5)
BILIRUB SERPL-MCNC: 0.4 MG/DL (ref 0–1.2)
BUN SERPL-MCNC: 18 MG/DL (ref 8–23)
BUN/CREAT SERPL: 16.4 (ref 7–25)
CALCIUM SPEC-SCNC: 9.6 MG/DL (ref 8.6–10.5)
CHLORIDE SERPL-SCNC: 107 MMOL/L (ref 98–107)
CO2 SERPL-SCNC: 25.7 MMOL/L (ref 22–29)
CREAT SERPL-MCNC: 1.1 MG/DL (ref 0.57–1)
DEPRECATED RDW RBC AUTO: 44.7 FL (ref 37–54)
EGFRCR SERPLBLD CKD-EPI 2021: 54.5 ML/MIN/1.73
EOSINOPHIL # BLD AUTO: 0.2 10*3/MM3 (ref 0–0.4)
EOSINOPHIL NFR BLD AUTO: 2.4 % (ref 0.3–6.2)
ERYTHROCYTE [DISTWIDTH] IN BLOOD BY AUTOMATED COUNT: 14.8 % (ref 12.3–15.4)
GLOBULIN UR ELPH-MCNC: 2.6 GM/DL
GLUCOSE SERPL-MCNC: 108 MG/DL (ref 65–99)
HCT VFR BLD AUTO: 31 % (ref 34–46.6)
HGB BLD-MCNC: 10.1 G/DL (ref 12–15.9)
IMM GRANULOCYTES # BLD AUTO: 0.06 10*3/MM3 (ref 0–0.05)
IMM GRANULOCYTES NFR BLD AUTO: 0.7 % (ref 0–0.5)
LYMPHOCYTES # BLD AUTO: 1.87 10*3/MM3 (ref 0.7–3.1)
LYMPHOCYTES NFR BLD AUTO: 22.5 % (ref 19.6–45.3)
MCH RBC QN AUTO: 27.5 PG (ref 26.6–33)
MCHC RBC AUTO-ENTMCNC: 32.6 G/DL (ref 31.5–35.7)
MCV RBC AUTO: 84.5 FL (ref 79–97)
MONOCYTES # BLD AUTO: 0.52 10*3/MM3 (ref 0.1–0.9)
MONOCYTES NFR BLD AUTO: 6.3 % (ref 5–12)
NEUTROPHILS NFR BLD AUTO: 5.59 10*3/MM3 (ref 1.7–7)
NEUTROPHILS NFR BLD AUTO: 67.1 % (ref 42.7–76)
NRBC BLD AUTO-RTO: 0 /100 WBC (ref 0–0.2)
PLATELET # BLD AUTO: 274 10*3/MM3 (ref 140–450)
PMV BLD AUTO: 10.3 FL (ref 6–12)
POTASSIUM SERPL-SCNC: 4.1 MMOL/L (ref 3.5–5.2)
PROT SERPL-MCNC: 6.6 G/DL (ref 6–8.5)
RBC # BLD AUTO: 3.67 10*6/MM3 (ref 3.77–5.28)
SODIUM SERPL-SCNC: 144 MMOL/L (ref 136–145)
WBC NRBC COR # BLD AUTO: 8.32 10*3/MM3 (ref 3.4–10.8)

## 2024-08-19 ENCOUNTER — HOSPITAL ENCOUNTER (OUTPATIENT)
Facility: HOSPITAL | Age: 69
Setting detail: HOSPITAL OUTPATIENT SURGERY
Discharge: HOME | End: 2024-08-19
Attending: INTERNAL MEDICINE | Admitting: INTERNAL MEDICINE
Payer: COMMERCIAL

## 2024-08-19 ENCOUNTER — ANESTHESIA (OUTPATIENT)
Dept: GASTROENTEROLOGY | Facility: HOSPITAL | Age: 69
End: 2024-08-19
Payer: COMMERCIAL

## 2024-08-19 ENCOUNTER — ANESTHESIA EVENT (OUTPATIENT)
Dept: GASTROENTEROLOGY | Facility: HOSPITAL | Age: 69
End: 2024-08-19
Payer: COMMERCIAL

## 2024-08-19 VITALS
HEIGHT: 66 IN | BODY MASS INDEX: 32.14 KG/M2 | HEART RATE: 78 BPM | RESPIRATION RATE: 16 BRPM | SYSTOLIC BLOOD PRESSURE: 139 MMHG | TEMPERATURE: 97.2 F | WEIGHT: 200 LBS | OXYGEN SATURATION: 91 % | DIASTOLIC BLOOD PRESSURE: 62 MMHG

## 2024-08-19 DIAGNOSIS — R91.1 INCIDENTAL LUNG NODULE, GREATER THAN OR EQUAL TO 8MM: ICD-10-CM

## 2024-08-19 PROCEDURE — 31624 DX BRONCHOSCOPE/LAVAGE: CPT | Performed by: INTERNAL MEDICINE

## 2024-08-19 PROCEDURE — 87205 SMEAR GRAM STAIN: CPT | Performed by: INTERNAL MEDICINE

## 2024-08-19 PROCEDURE — 87206 SMEAR FLUORESCENT/ACID STAI: CPT | Performed by: INTERNAL MEDICINE

## 2024-08-19 PROCEDURE — 87116 MYCOBACTERIA CULTURE: CPT | Performed by: INTERNAL MEDICINE

## 2024-08-19 PROCEDURE — 87102 FUNGUS ISOLATION CULTURE: CPT | Performed by: INTERNAL MEDICINE

## 2024-08-19 PROCEDURE — 87070 CULTURE OTHR SPECIMN AEROBIC: CPT | Performed by: INTERNAL MEDICINE

## 2024-08-19 PROCEDURE — 25010000002 PROPOFOL 10 MG/ML EMULSION: Performed by: NURSE ANESTHETIST, CERTIFIED REGISTERED

## 2024-08-19 PROCEDURE — 25810000003 SODIUM CHLORIDE 0.9 % SOLUTION: Performed by: NURSE ANESTHETIST, CERTIFIED REGISTERED

## 2024-08-19 RX ORDER — PROPOFOL 10 MG/ML
VIAL (ML) INTRAVENOUS AS NEEDED
Status: DISCONTINUED | OUTPATIENT
Start: 2024-08-19 | End: 2024-08-19 | Stop reason: SURG

## 2024-08-19 RX ORDER — FENTANYL CITRATE 50 UG/ML
50 INJECTION, SOLUTION INTRAMUSCULAR; INTRAVENOUS
Status: DISCONTINUED | OUTPATIENT
Start: 2024-08-19 | End: 2024-08-19 | Stop reason: HOSPADM

## 2024-08-19 RX ORDER — HYDROMORPHONE HYDROCHLORIDE 1 MG/ML
0.5 INJECTION, SOLUTION INTRAMUSCULAR; INTRAVENOUS; SUBCUTANEOUS
Status: DISCONTINUED | OUTPATIENT
Start: 2024-08-19 | End: 2024-08-19 | Stop reason: HOSPADM

## 2024-08-19 RX ORDER — SODIUM CHLORIDE 9 MG/ML
INJECTION, SOLUTION INTRAVENOUS CONTINUOUS PRN
Status: DISCONTINUED | OUTPATIENT
Start: 2024-08-19 | End: 2024-08-19 | Stop reason: SURG

## 2024-08-19 RX ORDER — DROPERIDOL 2.5 MG/ML
0.62 INJECTION, SOLUTION INTRAMUSCULAR; INTRAVENOUS ONCE AS NEEDED
Status: DISCONTINUED | OUTPATIENT
Start: 2024-08-19 | End: 2024-08-19 | Stop reason: HOSPADM

## 2024-08-19 RX ORDER — LIDOCAINE HYDROCHLORIDE 10 MG/ML
INJECTION, SOLUTION EPIDURAL; INFILTRATION; INTRACAUDAL; PERINEURAL AS NEEDED
Status: DISCONTINUED | OUTPATIENT
Start: 2024-08-19 | End: 2024-08-19 | Stop reason: SURG

## 2024-08-19 RX ADMIN — PROPOFOL 200 MG: 10 INJECTION, EMULSION INTRAVENOUS at 13:47

## 2024-08-19 RX ADMIN — PROPOFOL 100 MG: 10 INJECTION, EMULSION INTRAVENOUS at 13:52

## 2024-08-19 RX ADMIN — SODIUM CHLORIDE: 9 INJECTION, SOLUTION INTRAVENOUS at 13:43

## 2024-08-19 RX ADMIN — LIDOCAINE HYDROCHLORIDE 50 MG: 10 INJECTION, SOLUTION EPIDURAL; INFILTRATION; INTRACAUDAL; PERINEURAL at 13:47

## 2024-08-19 NOTE — H&P
"Pulmonary Office Follow Up           Subjective  Chief Complaint: Shortness of Breath     Agustina Reyes is a 68 y.o. female is being seen in follow up for COPD     History of Present Illness     Ms. Reyes is a 69yo F who is followed for COPD and an abnormal CT scan of the chest. She was last seen in clinic on 2/29/24.     She returns to clinic today for follow up.      Since her last visit, she was treated with several rounds of antibiotics and finally had steroids for hip bursitis with improvement in her symptoms. She was also diagnosed with a DVT and has been started on Eliquis.         The following portions of the patient's history were reviewed and updated as appropriate: allergies, current medications, past family history, past medical history, past social history, past surgical history and problem list.     Review of Systems   Constitutional: Negative.    HENT:  Positive for sinus pressure and sinus pain.    Eyes: Negative.    Cardiovascular: Negative.    Gastrointestinal: Negative.    Endocrine: Negative.    Genitourinary: Negative.    Musculoskeletal: Negative.    Skin: Negative.    Allergic/Immunologic: Negative.    Neurological: Negative.    Hematological: Negative.    Psychiatric/Behavioral: Negative.                    Objective  Blood pressure 130/80, pulse 68, temperature 97.9 °F (36.6 °C), height 168.9 cm (66.5\"), weight 90.6 kg (199 lb 11.2 oz), SpO2 98%, not currently breastfeeding.  Physical Exam  Vitals and nursing note reviewed.   Constitutional:       General: She is not in acute distress.     Appearance: She is well-developed.   HENT:      Head: Normocephalic and atraumatic.   Eyes:      General: No scleral icterus.     Conjunctiva/sclera: Conjunctivae normal.      Pupils: Pupils are equal, round, and reactive to light.   Neck:      Thyroid: No thyromegaly.      Trachea: No tracheal deviation.   Cardiovascular:      Rate and Rhythm: Normal rate and regular rhythm.      Heart sounds: " Normal heart sounds.   Pulmonary:      Effort: Pulmonary effort is normal. No respiratory distress.      Breath sounds: Normal breath sounds.   Abdominal:      General: Bowel sounds are normal.      Palpations: Abdomen is soft.      Tenderness: There is no abdominal tenderness.   Musculoskeletal:         General: Normal range of motion.      Cervical back: Normal range of motion and neck supple.   Lymphadenopathy:      Cervical: No cervical adenopathy.   Skin:     General: Skin is warm and dry.      Findings: No erythema or rash.   Neurological:      Mental Status: She is alert and oriented to person, place, and time.      Motor: No abnormal muscle tone.      Coordination: Coordination normal.   Psychiatric:         Speech: Speech normal.         Behavior: Behavior normal.         Judgment: Judgment normal.            PFTs:  No new PFTs.     Imaging:  CT Chest from 6/14/24 showed widespread peribronchial micronodularity and reticular nodular densities and patchy groundglass opacities as well as chronic consolidation and bronchiectasis in the base of the right middle lobe and lingula. Stable when compared to previous.            Assessment & Plan  Diagnoses and all orders for this visit:     1. Incidental lung nodule, greater than or equal to 8mm (Primary)  -     CT Chest Without Contrast; Future     2. Stage 2 moderate COPD by GOLD classification  -     Budeson-Glycopyrrol-Formoterol (Breztri Aerosphere) 160-9-4.8 MCG/ACT aerosol inhaler; Inhale 2 puffs 2 (Two) Times a Day.  Dispense: 3 each; Refill: 3                 Discussion:  Ms. Reyes is a 69yo F who is followed for COPD and Pulmonary Nodules     1. Abnormal CT Chest  - Tree-in-bud nodularity first noticed in May 2021.   - CT chest from June with nodularity that is suspicious for MAC. We discussed performing a bronchoscopy with BAL for further evaluation and she is agreeable.

## 2024-08-19 NOTE — ANESTHESIA PREPROCEDURE EVALUATION
Anesthesia Evaluation     Patient summary reviewed and Nursing notes reviewed   history of anesthetic complications:  PONV  NPO Solid Status: > 8 hours  NPO Liquid Status: > 2 hours           Airway   Mallampati: I  TM distance: >3 FB  Neck ROM: full  No difficulty expected  Dental      Pulmonary    (+) pneumonia , a smoker () Former, cigarettes, COPD (MDI) moderate,shortness of breath (cough)  (-) recent URI, sleep apnea    ROS comment:  1. Incidental lung nodule, greater than or equal to 8mm (Primary)  Abnormal CT Chest- Tree-in-bud nodularity first noticed in May 2021.   - CT chest from 3/14/24 with worsening nodularity still present/worsening therfore  scheduled for bronchoscopy/BAL.          2. Stage 2 moderate COPD by GOLD classification  -     Budeson-Glycopyrrol-Formoterol (Breztri Aerosphere) 160-9-4.8 MCG/ACT aerosol inhaler; Inhale 2 puffs 2 (Two) Times a Day.  Dispense: 3 each; Refill: 3          Cardiovascular     ECG reviewed    (+) hypertension, DVT (april on eliquis), hyperlipidemia  (-) past MI, dysrhythmias, angina, cardiac stents    ROS comment: ECG SR       1. CAD possible - coronary calcifications    2. Left occipital  CVA with a possible LPCA stenosis by MRA,  a. MRI Brain  w/o contrast, 2014:  Showing scattered foci of acute ischemia on left occipital lobe.    b. MRA of neck with and without contrast:  Focal area of the P1 segment of left posterior cerebral artery.    c. Echocardiogram, 2014, LVEF 60-65%.  No  thrombus in left atrial appendage. No PFO. Prominent Chiari network noted in right atrium.    d. Carotid duplex, 2014:  Showing no significant carotid artery stenosis bilaterally.     e. Medtronic loop recorder implant, 2014 (battery life , 2018)  f. Transcranial  Doppler, 2014:  Showing normal flow and wave form seen in MELODIE, NCA, terminal ICA, PCA      Neuro/Psych  (+) CVA (small 10 yrs ago- no rehab- min residual now), headaches   GI/Hepatic/Renal/Endo    (+) GERD, renal disease (creat elevation new onset creat 1.1 over last few moinths)- ARF  (-) diabetes, no thyroid disorder    Musculoskeletal     Abdominal    Substance History      OB/GYN          Other   blood dyscrasia anemia,     ROS/Med Hx Other: HCT 31                 Anesthesia Plan    ASA 3     general     (Post Cerebral artery stenosis c stroke-  aim MAP >65 )  intravenous induction     Anesthetic plan, risks, benefits, and alternatives have been provided, discussed and informed consent has been obtained with: patient.    Plan discussed with CRNA.      CODE STATUS:

## 2024-08-19 NOTE — ANESTHESIA PROCEDURE NOTES
Airway  Urgency: elective    Date/Time: 8/19/2024 1:49 PM  Airway not difficult    General Information and Staff    Patient location during procedure: OR  CRNA/CAA: Junior BRYNN Mchugh, CRNA    Indications and Patient Condition  Indications for airway management: airway protection    Preoxygenated: yes  Mask difficulty assessment: 1 - vent by mask    Final Airway Details  Final airway type: supraglottic airway      Successful airway: I-gel  Size 4     Number of attempts at approach: 1  Assessment: lips, teeth, and gum same as pre-op    Additional Comments  LMA placed without difficulty, ventilation with assist, equal breath sounds and symmetric chest rise and fall

## 2024-08-19 NOTE — ANESTHESIA POSTPROCEDURE EVALUATION
Patient: Agustina Reyes    Procedure Summary       Date: 08/19/24 Room / Location:  [facility] /  [facility]    Anesthesia Start: 1343 Anesthesia Stop: 1405    Procedure: BRONCHOSCOPY WITH BAL (BRONCHOALVEOLAR LAVAGE) (Bronchus) Diagnosis:       Incidental lung nodule, greater than or equal to 8mm      (Incidental lung nodule, greater than or equal to 8mm [R91.1])    Surgeons: Aria Irwin DO Provider: Farhan Morgan MD    Anesthesia Type: general ASA Status: 3            Anesthesia Type: general    Vitals  Vitals Value Taken Time   BP     Temp     Pulse 79 08/19/24 1403   Resp     SpO2 85 % 08/19/24 1403   Vitals shown include unfiled device data.        Post Anesthesia Care and Evaluation    Patient location during evaluation: PACU  Patient participation: complete - patient participated  Level of consciousness: awake and alert  Pain management: adequate    Airway patency: patent  Anesthetic complications: No anesthetic complications  PONV Status: none  Cardiovascular status: hemodynamically stable and acceptable  Respiratory status: nonlabored ventilation, acceptable and nasal cannula  Hydration status: acceptable    Comments: 94% rr15 97.1 118/46

## 2024-08-20 LAB
GIE STN SPEC: NORMAL
GIE STN SPEC: NORMAL

## 2024-08-21 LAB
BACTERIA SPEC RESP CULT: NORMAL
BACTERIA SPEC RESP CULT: NORMAL
GRAM STN SPEC: NORMAL
REF LAB TEST METHOD: NORMAL

## 2024-08-22 ENCOUNTER — HOSPITAL ENCOUNTER (OUTPATIENT)
Facility: HOSPITAL | Age: 69
Discharge: HOME | End: 2024-08-22
Admitting: INTERNAL MEDICINE
Payer: COMMERCIAL

## 2024-08-22 DIAGNOSIS — I82.4Y1 ACUTE DEEP VEIN THROMBOSIS (DVT) OF PROXIMAL VEIN OF RIGHT LOWER EXTREMITY: ICD-10-CM

## 2024-08-22 LAB
BH CV LOW VAS RIGHT PROXIMAL FEMORAL SPONT: 1
BH CV LOWER VASCULAR LEFT COMMON FEMORAL PHASIC: NORMAL
BH CV LOWER VASCULAR LEFT COMMON FEMORAL SPONT: NORMAL
BH CV LOWER VASCULAR RIGHT COMMON FEMORAL AUGMENT: NORMAL
BH CV LOWER VASCULAR RIGHT COMMON FEMORAL COMPRESS: NORMAL
BH CV LOWER VASCULAR RIGHT COMMON FEMORAL PHASIC: NORMAL
BH CV LOWER VASCULAR RIGHT COMMON FEMORAL SPONT: NORMAL
BH CV LOWER VASCULAR RIGHT DISTAL FEMORAL AUGMENT: NORMAL
BH CV LOWER VASCULAR RIGHT DISTAL FEMORAL COMPETENT: NORMAL
BH CV LOWER VASCULAR RIGHT DISTAL FEMORAL COMPRESS: NORMAL
BH CV LOWER VASCULAR RIGHT DISTAL FEMORAL PHASIC: NORMAL
BH CV LOWER VASCULAR RIGHT DISTAL FEMORAL SPONT: NORMAL
BH CV LOWER VASCULAR RIGHT GASTRONEMIUS COMPRESS: NORMAL
BH CV LOWER VASCULAR RIGHT GREATER SAPH AK COMPRESS: NORMAL
BH CV LOWER VASCULAR RIGHT GREATER SAPH BK COMPRESS: NORMAL
BH CV LOWER VASCULAR RIGHT LESSER SAPH COMPRESS: NORMAL
BH CV LOWER VASCULAR RIGHT MID FEMORAL AUGMENT: NORMAL
BH CV LOWER VASCULAR RIGHT MID FEMORAL COMPRESS: NORMAL
BH CV LOWER VASCULAR RIGHT MID FEMORAL PHASIC: NORMAL
BH CV LOWER VASCULAR RIGHT MID FEMORAL SPONT: NORMAL
BH CV LOWER VASCULAR RIGHT PERONEAL AUGMENT: NORMAL
BH CV LOWER VASCULAR RIGHT PERONEAL COMPRESS: NORMAL
BH CV LOWER VASCULAR RIGHT POPLITEAL AUGMENT: NORMAL
BH CV LOWER VASCULAR RIGHT POPLITEAL COMPRESS: NORMAL
BH CV LOWER VASCULAR RIGHT POPLITEAL PHASIC: NORMAL
BH CV LOWER VASCULAR RIGHT POPLITEAL SPONT: NORMAL
BH CV LOWER VASCULAR RIGHT POPLITEAL THROMBUS: NORMAL
BH CV LOWER VASCULAR RIGHT POSTERIOR TIBIAL AUGMENT: NORMAL
BH CV LOWER VASCULAR RIGHT POSTERIOR TIBIAL COMPRESS: NORMAL
BH CV LOWER VASCULAR RIGHT PROFUNDA FEMORAL SPONT: NORMAL
BH CV LOWER VASCULAR RIGHT PROXIMAL FEMORAL COMPRESS: NORMAL
BH CV LOWER VASCULAR RIGHT PROXIMAL FEMORAL PHASIC: NORMAL
BH CV LOWER VASCULAR RIGHT PROXIMAL FEMORAL SPONT: NORMAL
BH CV LOWER VASCULAR RIGHT PROXIMAL FEMORAL THROMBUS: NORMAL
BH CV LOWER VASCULAR RIGHT SAPHENOFEMORAL JUNCTION COMPRESS: NORMAL
BH CV LOWER VASCULAR RIGHT SAPHENOFEMORAL JUNCTION PHASIC: NORMAL
BH CV LOWER VASCULAR RIGHT SAPHENOFEMORAL JUNCTION SPONT: NORMAL

## 2024-08-22 PROCEDURE — 93971 EXTREMITY STUDY: CPT

## 2024-08-22 PROCEDURE — 93971 EXTREMITY STUDY: CPT | Performed by: INTERNAL MEDICINE

## 2024-08-26 LAB
MYCOBACTERIUM SPEC CULT: NORMAL
MYCOBACTERIUM SPEC CULT: NORMAL
NIGHT BLUE STAIN TISS: NORMAL
NIGHT BLUE STAIN TISS: NORMAL

## 2024-08-28 ENCOUNTER — TELEPHONE (OUTPATIENT)
Dept: INTERNAL MEDICINE | Facility: CLINIC | Age: 69
End: 2024-08-28
Payer: COMMERCIAL

## 2024-08-28 DIAGNOSIS — R30.0 DYSURIA: Primary | ICD-10-CM

## 2024-08-28 DIAGNOSIS — R79.89 ABNORMAL BLOOD CREATININE LEVEL: ICD-10-CM

## 2024-08-28 NOTE — TELEPHONE ENCOUNTER
Pt was told to check with you on her kidney function and blood counts. They look to be normal for her. I advised that I would send you a message to make sure there isn't anything she needs to be concerned about. She verbalized understanding and stated I could LM if she didn't answer return call.

## 2024-08-28 NOTE — TELEPHONE ENCOUNTER
Caller: Agustina Reyes    Relationship: Self    Best call back number: [phone]     What medication are you requesting: ANTIBIOTIC TO TREAT UTI    What are your current symptoms: UTI    How long have you been experiencing symptoms: 1 WEEK    Have you had these symptoms before:    [] Yes  [x] No    Have you been treated for these symptoms before:   [] Yes  [x] No    If a prescription is needed, what is your preferred pharmacy and phone number: [id number] [place] [address] - [phone]  - [phone] FX     Additional notes:  PATIENT HAS CALLED AND STATED SHE HAD A PROCEDURE DONE A WEEK AGO AND HAS DEVELOPED SYMPTOMS OF A UTI, PATIENT IS REQUESTING IF AN ANTIBIOTIC TO BE CALLED IN TO TREAT. PATIENT IS REQUESTING A CALL BACK EITHER WAY TO LET HER KNOW IF SOMETHING WILL BE CALLED IN.

## 2024-08-28 NOTE — TELEPHONE ENCOUNTER
PATIENT HAS CALLED REQUESTING A CALL BACK IN REGARDS TO A PROCEDURE SHE HAD DONE A WEEK AGO. PATIENT STATES ANESTHESIOLOGIST RECOMMENDED PATIENT CALL PCP IN REGARDS TO KIDNEY FUNCTION LEVELS.    CALL BACK NUMBER [phone]

## 2024-08-28 NOTE — TELEPHONE ENCOUNTER
She needs to have a urinalysis before I can treat her symptoms.    As far as the abnormal kidney function blood test I want her to repeat those blood tests in a well-hydrated state.  She should drink two 12 ounce glasses of water beginning about 2 hours prior to going to the lab.

## 2024-08-29 ENCOUNTER — LAB (OUTPATIENT)
Dept: LAB | Facility: HOSPITAL | Age: 69
End: 2024-08-29
Payer: COMMERCIAL

## 2024-08-29 DIAGNOSIS — R79.89 ABNORMAL BLOOD CREATININE LEVEL: ICD-10-CM

## 2024-08-29 LAB
ANION GAP SERPL CALCULATED.3IONS-SCNC: 10 MMOL/L (ref 5–15)
BILIRUB UR QL STRIP: NEGATIVE
BUN SERPL-MCNC: 20 MG/DL (ref 8–23)
BUN/CREAT SERPL: 18.5 (ref 7–25)
CALCIUM SPEC-SCNC: 9.5 MG/DL (ref 8.6–10.5)
CHLORIDE SERPL-SCNC: 99 MMOL/L (ref 98–107)
CLARITY UR: CLEAR
CO2 SERPL-SCNC: 27 MMOL/L (ref 22–29)
COLOR UR: YELLOW
CREAT SERPL-MCNC: 1.08 MG/DL (ref 0.57–1)
EGFRCR SERPLBLD CKD-EPI 2021: 55.7 ML/MIN/1.73
GLUCOSE SERPL-MCNC: 94 MG/DL (ref 65–99)
GLUCOSE UR STRIP-MCNC: NEGATIVE MG/DL
HGB UR QL STRIP.AUTO: ABNORMAL
HOLD SPECIMEN: NORMAL
KETONES UR QL STRIP: NEGATIVE
LEUKOCYTE ESTERASE UR QL STRIP.AUTO: ABNORMAL
NITRITE UR QL STRIP: NEGATIVE
PH UR STRIP.AUTO: 7 [PH] (ref 5–8)
POTASSIUM SERPL-SCNC: 3.9 MMOL/L (ref 3.5–5.2)
PROT UR QL STRIP: NEGATIVE
SODIUM SERPL-SCNC: 136 MMOL/L (ref 136–145)
SP GR UR STRIP: <=1.005 (ref 1–1.03)
UROBILINOGEN UR QL STRIP: ABNORMAL

## 2024-08-29 PROCEDURE — 87077 CULTURE AEROBIC IDENTIFY: CPT | Performed by: INTERNAL MEDICINE

## 2024-08-29 PROCEDURE — 87186 SC STD MICRODIL/AGAR DIL: CPT | Performed by: INTERNAL MEDICINE

## 2024-08-29 PROCEDURE — 80048 BASIC METABOLIC PNL TOTAL CA: CPT

## 2024-08-29 PROCEDURE — 36415 COLL VENOUS BLD VENIPUNCTURE: CPT

## 2024-08-29 PROCEDURE — 87086 URINE CULTURE/COLONY COUNT: CPT | Performed by: INTERNAL MEDICINE

## 2024-08-29 PROCEDURE — 81001 URINALYSIS AUTO W/SCOPE: CPT | Performed by: INTERNAL MEDICINE

## 2024-08-29 PROCEDURE — 82610 CYSTATIN C: CPT

## 2024-08-30 LAB
BACTERIA UR QL AUTO: ABNORMAL /HPF
HYALINE CASTS UR QL AUTO: ABNORMAL /LPF
RBC # UR STRIP: ABNORMAL /HPF
REF LAB TEST METHOD: ABNORMAL
SQUAMOUS #/AREA URNS HPF: ABNORMAL /HPF
WBC # UR STRIP: ABNORMAL /HPF

## 2024-08-30 RX ORDER — NITROFURANTOIN 25; 75 MG/1; MG/1
100 CAPSULE ORAL 2 TIMES DAILY
Qty: 10 CAPSULE | Refills: 0 | Status: SHIPPED | OUTPATIENT
Start: 2024-08-30

## 2024-08-30 NOTE — TELEPHONE ENCOUNTER
PT CALLED AND STATED SHE HAD HER URINE TESTED YESTERDAY AND WANTS TO KNOW THE RESULTS BEFORE THE WEEKEND.  YOU MAY LEAVE HER A VOICEMAIL WITH THE RESULTS.

## 2024-09-01 LAB — BACTERIA SPEC AEROBE CULT: ABNORMAL

## 2024-09-03 LAB
CYSTATIN C SERPL-MCNC: 1.56 MG/L (ref 0.72–1.16)
GFR/BSA.PRED SERPLBLD CYS-BASED-ARV: 39 ML/MIN/1.73
MYCOBACTERIUM SPEC CULT: ABNORMAL
MYCOBACTERIUM SPEC CULT: ABNORMAL
NIGHT BLUE STAIN TISS: ABNORMAL
NIGHT BLUE STAIN TISS: ABNORMAL

## 2024-09-05 DIAGNOSIS — A31.0 MYCOBACTERIUM AVIUM COMPLEX: Primary | ICD-10-CM

## 2024-09-19 LAB
MYCOBACTERIUM SPEC CULT: ABNORMAL
NIGHT BLUE STAIN TISS: ABNORMAL

## 2024-09-24 ENCOUNTER — TELEPHONE (OUTPATIENT)
Dept: INTERNAL MEDICINE | Facility: CLINIC | Age: 69
End: 2024-09-24

## 2024-09-24 DIAGNOSIS — R30.0 DYSURIA: Primary | ICD-10-CM

## 2024-09-24 RX ORDER — NYSTATIN 100000 U/G
1 CREAM TOPICAL 2 TIMES DAILY
Qty: 30 G | Refills: 0 | Status: SHIPPED | OUTPATIENT
Start: 2024-09-24

## 2024-09-24 RX ORDER — CEFDINIR 300 MG/1
300 CAPSULE ORAL 2 TIMES DAILY
Qty: 14 CAPSULE | Refills: 0 | Status: SHIPPED | OUTPATIENT
Start: 2024-09-24 | End: 2024-10-14

## 2024-09-24 NOTE — TELEPHONE ENCOUNTER
Since we just treated her less than a month ago, I want her to stop by the lab and leave a urine specimen before she starts the antibiotic.  Otherwise, if it recurs we will be guessing on how to treat.  I sent nystatin and an antibiotic to her pharmacy.

## 2024-09-24 NOTE — TELEPHONE ENCOUNTER
Caller: Agustina Reyes    Relationship: Self    Best call back number: [phone]     What medication are you requesting: SOMETHING FOR UTI SYMPTOMS AND NYSTATIN CREAM      What are your current symptoms: BURNING IN VAGINAL AREA    PATIENT WOULD LIKE THE NYSTATIN FOR SWEATING UNDER BREAST     How long have you been experiencing symptoms: SINCE 9/20/24     Have you had these symptoms before:    [x] Yes  [] No    Have you been treated for these symptoms before:   [x] Yes  [] No    If a prescription is needed, what is your preferred pharmacy and phone number: [id number] [address] - [phone]  - [phone] FX     Additional notes: PATIENT STATES SHE HAS BEEN PRESCRIBED NYSTATIN CREAM FOR THE SWEATING BEFORE AND WOULD LIKE IT PRESCRIBED AGAIN.

## 2024-09-24 NOTE — TELEPHONE ENCOUNTER
Patient will stop at the lab tomorrow morning. Understood to wait taking the antibiotic.   Helical Rim Text: The closure involved the helical rim.

## 2024-09-26 ENCOUNTER — LAB (OUTPATIENT)
Dept: LAB | Facility: HOSPITAL | Age: 69
End: 2024-09-26
Payer: COMMERCIAL

## 2024-09-26 DIAGNOSIS — R30.0 DYSURIA: ICD-10-CM

## 2024-09-26 LAB
BACTERIA UR QL AUTO: ABNORMAL /HPF
BILIRUB UR QL STRIP: NEGATIVE
CLARITY UR: CLEAR
COLOR UR: YELLOW
GLUCOSE UR STRIP-MCNC: NEGATIVE MG/DL
HGB UR QL STRIP.AUTO: NEGATIVE
HOLD SPECIMEN: NORMAL
HYALINE CASTS UR QL AUTO: ABNORMAL /LPF
KETONES UR QL STRIP: NEGATIVE
LEUKOCYTE ESTERASE UR QL STRIP.AUTO: ABNORMAL
NITRITE UR QL STRIP: NEGATIVE
PH UR STRIP.AUTO: 8 [PH] (ref 5–8)
PROT UR QL STRIP: NEGATIVE
RBC # UR STRIP: ABNORMAL /HPF
REF LAB TEST METHOD: ABNORMAL
SP GR UR STRIP: 1.01 (ref 1–1.03)
SQUAMOUS #/AREA URNS HPF: ABNORMAL /HPF
UROBILINOGEN UR QL STRIP: ABNORMAL
WBC # UR STRIP: ABNORMAL /HPF

## 2024-09-26 PROCEDURE — 87186 SC STD MICRODIL/AGAR DIL: CPT

## 2024-09-26 PROCEDURE — 81001 URINALYSIS AUTO W/SCOPE: CPT

## 2024-09-26 PROCEDURE — 87088 URINE BACTERIA CULTURE: CPT

## 2024-09-26 PROCEDURE — 87086 URINE CULTURE/COLONY COUNT: CPT

## 2024-09-27 ENCOUNTER — TELEPHONE (OUTPATIENT)
Dept: INTERNAL MEDICINE | Facility: CLINIC | Age: 69
End: 2024-09-27
Payer: COMMERCIAL

## 2024-09-29 LAB — BACTERIA SPEC AEROBE CULT: ABNORMAL

## 2024-09-30 LAB
FUNGUS WND CULT: NORMAL
FUNGUS WND CULT: NORMAL

## 2024-10-02 LAB
MYCOBACTERIUM SPEC CULT: ABNORMAL
MYCOBACTERIUM SPEC CULT: ABNORMAL
NIGHT BLUE STAIN TISS: ABNORMAL
NIGHT BLUE STAIN TISS: ABNORMAL

## 2024-10-14 ENCOUNTER — OFFICE VISIT (OUTPATIENT)
Dept: PULMONOLOGY | Facility: CLINIC | Age: 69
End: 2024-10-14
Payer: COMMERCIAL

## 2024-10-14 VITALS
HEART RATE: 74 BPM | BODY MASS INDEX: 31.98 KG/M2 | TEMPERATURE: 97.3 F | OXYGEN SATURATION: 95 % | HEIGHT: 66 IN | WEIGHT: 199 LBS | SYSTOLIC BLOOD PRESSURE: 140 MMHG | DIASTOLIC BLOOD PRESSURE: 72 MMHG

## 2024-10-14 DIAGNOSIS — Z23 IMMUNIZATION DUE: Primary | ICD-10-CM

## 2024-10-14 DIAGNOSIS — A31.0 MYCOBACTERIUM INTRACELLULARE INFECTION: ICD-10-CM

## 2024-10-14 DIAGNOSIS — R91.8 MULTIPLE LUNG NODULES ON CT: ICD-10-CM

## 2024-10-14 PROCEDURE — 90662 IIV NO PRSV INCREASED AG IM: CPT | Performed by: INTERNAL MEDICINE

## 2024-10-14 PROCEDURE — 90471 IMMUNIZATION ADMIN: CPT | Performed by: INTERNAL MEDICINE

## 2024-10-14 PROCEDURE — 99214 OFFICE O/P EST MOD 30 MIN: CPT | Performed by: INTERNAL MEDICINE

## 2024-10-14 NOTE — PROGRESS NOTES
"Pulmonary Office Follow Up      Subjective   Chief Complaint: Shortness of Breath    Agustina Reyes is a 69 y.o. female is being seen in follow up for COPD    History of Present Illness    Ms. Reyes is a 68yo F who is followed for COPD and an abnormal CT scan of the chest. She was last seen in clinic on 6/9/24.    She returns to clinic today for follow up. Since her last visit, she underwent a bronchoscopy with BAL on 8/19/24 which grew Mycobacterium intracellulare. She was referred to ID. She has not been scheduled for an appointment.       The following portions of the patient's history were reviewed and updated as appropriate: allergies, current medications, past family history, past medical history, past social history, past surgical history and problem list.    Review of Systems   Constitutional: Negative.    HENT:  Positive for sinus pressure and sinus pain.    Eyes: Negative.    Cardiovascular: Negative.    Gastrointestinal: Negative.    Endocrine: Negative.    Genitourinary: Negative.    Musculoskeletal: Negative.    Skin: Negative.    Allergic/Immunologic: Negative.    Neurological: Negative.    Hematological: Negative.    Psychiatric/Behavioral: Negative.            Objective   Blood pressure 140/72, pulse 74, temperature 97.3 °F (36.3 °C), height 167.6 cm (66\"), weight 90.3 kg (199 lb), SpO2 95%, not currently breastfeeding.  Physical Exam  Vitals and nursing note reviewed.   Constitutional:       General: She is not in acute distress.     Appearance: She is well-developed.   HENT:      Head: Normocephalic and atraumatic.   Eyes:      General: No scleral icterus.     Conjunctiva/sclera: Conjunctivae normal.      Pupils: Pupils are equal, round, and reactive to light.   Neck:      Thyroid: No thyromegaly.      Trachea: No tracheal deviation.   Cardiovascular:      Rate and Rhythm: Normal rate and regular rhythm.      Heart sounds: Normal heart sounds.   Pulmonary:      Effort: Pulmonary effort is " normal. No respiratory distress.      Breath sounds: Normal breath sounds.   Abdominal:      General: Bowel sounds are normal.      Palpations: Abdomen is soft.      Tenderness: There is no abdominal tenderness.   Musculoskeletal:         General: Normal range of motion.      Cervical back: Normal range of motion and neck supple.   Lymphadenopathy:      Cervical: No cervical adenopathy.   Skin:     General: Skin is warm and dry.      Findings: No erythema or rash.   Neurological:      Mental Status: She is alert and oriented to person, place, and time.      Motor: No abnormal muscle tone.      Coordination: Coordination normal.   Psychiatric:         Speech: Speech normal.         Behavior: Behavior normal.         Judgment: Judgment normal.         PFTs:  No new PFTs.    Imaging:  No new imaging.     Assessment & Plan   Diagnoses and all orders for this visit:    1. Immunization due (Primary)  -     Fluzone High-Dose 65+yrs (0717-2204)    2. Multiple lung nodules on CT  -     CT Chest Without Contrast; Future    3. Mycobacterium intracellulare infection  -     CT Chest Without Contrast; Future              Discussion:  Ms. Reyes is a 70yo F who is followed for COPD and Pulmonary Nodules     1. Moderate COPD  - Continue Breztri.  - Continue Albuterol as needed.   - Flu shot given today.      2. Abnormal CT Chest/Mycobacterium  - Tree-in-bud nodularity first noticed in May 2021.   - Bronchoscopy on 8/19/24 grew mycobacterium intracelllulare and she was referred to ID. We will see if we can help facilitate this appointment.   - Repeat CT Chest in December.      3. Allergic Rhinitis/Sinusitis  - Continue OTC antihistamine   - Continue Mucinex.   - Nasal sinus rinses instead.      4. GERD  - Elevate the head of the bed.   - Do not eat within 2-3 hours of bedtime when possible.     Follow up in 4-6 months.        Agustina Reyes  reports that she quit smoking about 10 years ago. Her smoking use included cigarettes.  She started smoking about 50 years ago. She has a 40 pack-year smoking history. She has never used smokeless tobacco.        Aria MARERRO Case, DO  Pulmonary and Critical Care Medicine  Note Electronically Signed

## 2024-10-28 RX ORDER — APIXABAN 5 MG/1
5 TABLET, FILM COATED ORAL 2 TIMES DAILY
Qty: 60 TABLET | Refills: 2 | Status: SHIPPED | OUTPATIENT
Start: 2024-10-28

## 2024-12-06 ENCOUNTER — HOSPITAL ENCOUNTER (OUTPATIENT)
Facility: HOSPITAL | Age: 69
Discharge: HOME | End: 2024-12-06
Admitting: INTERNAL MEDICINE
Payer: COMMERCIAL

## 2024-12-06 DIAGNOSIS — A31.0 MYCOBACTERIUM INTRACELLULARE INFECTION: ICD-10-CM

## 2024-12-06 DIAGNOSIS — R91.8 MULTIPLE LUNG NODULES ON CT: ICD-10-CM

## 2024-12-06 PROCEDURE — 71250 CT THORAX DX C-: CPT

## 2024-12-10 ENCOUNTER — TELEMEDICINE (OUTPATIENT)
Dept: INTERNAL MEDICINE | Facility: CLINIC | Age: 69
End: 2024-12-10
Payer: COMMERCIAL

## 2024-12-10 ENCOUNTER — TELEPHONE (OUTPATIENT)
Dept: INTERNAL MEDICINE | Facility: CLINIC | Age: 69
End: 2024-12-10
Payer: COMMERCIAL

## 2024-12-10 DIAGNOSIS — R50.9 FEVER AND CHILLS: ICD-10-CM

## 2024-12-10 DIAGNOSIS — I10 PRIMARY HYPERTENSION: ICD-10-CM

## 2024-12-10 DIAGNOSIS — R05.2 SUBACUTE COUGH: Primary | ICD-10-CM

## 2024-12-10 DIAGNOSIS — R06.02 SHORTNESS OF BREATH: ICD-10-CM

## 2024-12-10 DIAGNOSIS — J44.9 STAGE 2 MODERATE COPD BY GOLD CLASSIFICATION: ICD-10-CM

## 2024-12-10 DIAGNOSIS — R05.1 ACUTE COUGH: Primary | ICD-10-CM

## 2024-12-10 PROCEDURE — 99214 OFFICE O/P EST MOD 30 MIN: CPT | Performed by: INTERNAL MEDICINE

## 2024-12-10 NOTE — PROGRESS NOTES
"[facility]     Agustina Reyes  1955   [id number]      Patient Care Team:  Rich Zaragoza MD as PCP - General (Internal Medicine)  Rich Zaragoza MD as PCP - Internal Medicine (Internal Medicine)  China Ivey APRN as Referring Physician (Neurology)  Paco Bernal MD as Consulting Physician (Cardiology)  Eusebia Gomes APRN as Nurse Practitioner (Pulmonary Disease)  Aria Irwin DO as Consulting Physician (Pulmonary Disease)    CC:Cough congestion     You have chosen to receive care through a telehealth visit.  Do you consent to use a video/audio connection for your medical care today? Yes  Video visit accomplished by using the providers desktop and cell phone in the office and the patient at her home in [place] using her cell phone.    Patient is a 69 y.o. female.   History of Present Illness  Sneezing about a week, chest congestion and head congestion. Nasal drainage and cough productive with green sputum. Using breztri inhaler.Mildly short of breath but breztri helps. Also has albuterol as needed. Had fever 3 days ago.    Using plain mucinex and nyquil but it \"knocked her out\".     She just had a CT of the lungs on 12/6/2024 which did reveal diffuse findings of MAC.  See states that she has been diagnosed with that.  She sees Dr. Karen Irwin.  She also has COPD.      The patient presents via video visit for evaluation of an acute cough.    She has been experiencing an acute cough accompanied by shortness of breath, and head and chest congestion for the past week. She also reported fever and chills approximately 5 days ago, but these symptoms have since subsided. She has not conducted any home testing for COVID-19 or influenza. She does appear ill on video but does not appear septic or in acute distress. Her blood pressure is on the high side, so this is not consistent with sepsis. She does have a history of MAC and COPD and sees pulmonologist, Dr. Jones " Case.    MEDICATIONS  Current: Mucinex, Breztri inhaler, albuterol rescue inhaler, chlorthalidone, losartan.     BP this week has run 167 or 137 systolic.  So she has not been hypotensive.    CHRONIC CONDITIONS      Past Medical History:   Diagnosis Date    Cataract 2017    COPD (chronic obstructive pulmonary disease) May2020    Deep vein thrombosis April2024    GERD (gastroesophageal reflux disease)     Hyperlipidemia     Hypertension     Migraines     Occipital neuralgia of left side     Pneumonia     PONV (postoperative nausea and vomiting)     Shingles     Stroke 2014    no residual effects    Umbilical hernia        Past Surgical History:   Procedure Laterality Date    BRONCHOSCOPY N/A 8/19/2024    Procedure: BRONCHOSCOPY WITH BRONCHOALVEOLAR LAVAGE;  Surgeon: Aria Irwin DO;  Location: [facility] ENDOSCOPY;  Service: Pulmonary;  Laterality: N/A;    CATARACT EXTRACTION Bilateral     COLONOSCOPY      FOOT SURGERY Left     x4    HYSTERECTOMY  2000    SUBTOTAL HYSTERECTOMY         Family History   Problem Relation Age of Onset    Heart disease Mother     Stroke Mother     Hypertension Mother     Heart disease Father     Hypertension Father     Stroke Maternal Grandfather     No Known Problems Sister     Breast cancer Neg Hx     Ovarian cancer Neg Hx        Social History     Socioeconomic History    Marital status: Single   Tobacco Use    Smoking status: Former     Current packs/day: 0.00     Average packs/day: 1 pack/day for 40.0 years (40.0 ttl pk-yrs)     Types: Cigarettes     Start date: 8/4/1974     Quit date: 8/4/2014     Years since quitting: 10.3    Smokeless tobacco: Never   Vaping Use    Vaping status: Never Used   Substance and Sexual Activity    Alcohol use: Yes     Comment: occ.    Drug use: No    Sexual activity: Defer       Allergies   Allergen Reactions    Erythromycin GI Intolerance     Nausea      Amlodipine Other (See Comments)     Pt states she does not remember an allergy to this medicine,  states her legs were swelling    Lisinopril Other (See Comments)     Joint pain    Sulfa Antibiotics Unknown (See Comments)     Pt's home pharmacy has sulfa drugs listed as an allergy.       Review of Systems:     Review of Systems    Vital Signs  There were no vitals filed for this visit.  There is no height or weight on file to calculate BMI.  BMI is >= 30 and <35. (Class 1 Obesity). The following options were offered after discussion;: Information on healthy weight added to patient's after visit summary.          Current Outpatient Medications:     albuterol sulfate  (90 Base) MCG/ACT inhaler, Inhale 2 puffs Every 4 (Four) Hours As Needed for Wheezing., Disp: 18 g, Rfl: 5    amoxicillin-clavulanate (AUGMENTIN) 875-125 MG per tablet, Take 1 tablet by mouth 2 (Two) Times a Day., Disp: 14 tablet, Rfl: 0    aspirin 81 MG tablet, Take 1 tablet by mouth Daily., Disp: , Rfl:     atorvastatin (LIPITOR) 40 MG tablet, Take 1 tablet by mouth Daily., Disp: 90 tablet, Rfl: 3    Budeson-Glycopyrrol-Formoterol (Breztri Aerosphere) 160-9-4.8 MCG/ACT aerosol inhaler, Inhale 2 puffs 2 (Two) Times a Day., Disp: 3 each, Rfl: 3    butalbital-acetaminophen  MG tablet tablet, Take 1-2 tablets by mouth Every 6 (Six) Hours As Needed (migraines)., Disp: 15 each, Rfl: 0    chlorthalidone (HYGROTON) 25 MG tablet, TAKE 1 TABLET BY MOUTH DAILY, Disp: 90 tablet, Rfl: 3    Eliquis 5 MG tablet tablet, TAKE 1 TABLET BY MOUTH TWICE DAILY, Disp: 60 tablet, Rfl: 2    losartan (COZAAR) 100 MG tablet, Take 1 tablet by mouth Daily., Disp: 90 tablet, Rfl: 3    prochlorperazine (COMPAZINE) 5 MG tablet, Take 1-2 tablets by mouth Every 6 (Six) Hours As Needed for Nausea or Vomiting (migraine)., Disp: 20 tablet, Rfl: 5    Physical Exam:    Physical Exam  Constitutional:       General: She is not in acute distress.     Appearance: She is obese. She is ill-appearing. She is not toxic-appearing.   HENT:      Head: Normocephalic.   Eyes:       Extraocular Movements: Extraocular movements intact.      Conjunctiva/sclera: Conjunctivae normal.      Pupils: Pupils are equal, round, and reactive to light.   Pulmonary:      Effort: Pulmonary effort is normal.   Neurological:      Mental Status: She is alert.   Psychiatric:         Mood and Affect: Mood normal.         Thought Content: Thought content normal.        ACE III MINI        Results Review:    I reviewed the patient's new clinical results.  Results         CMP:  Lab Results   Component Value Date    Glucose 94 08/29/2024    Glucose, UA Negative 09/26/2024    BUN 20 08/29/2024    BUN/Creatinine Ratio 18.5 08/29/2024    Creatinine 1.08 (H) 08/29/2024    Creatinine 0.90 05/03/2021    Creatinine, Urine 14.8 04/24/2024    Ketones, UA Negative 09/26/2024    CO2 27.0 08/29/2024    Calcium 9.5 08/29/2024    Albumin 4.0 08/15/2024    Albumin 2.8 (L) 08/22/2023    AST (SGOT) 15 08/15/2024    ALT (SGPT) 13 08/15/2024     HbA1c:  Lab Results   Component Value Date    HGBA1C 6.1 (H) 08/18/2023    HGBA1C 6.10 (H) 01/23/2023     Microalbumin:  Lab Results   Component Value Date    MICROALBUR <1.2 04/24/2024     Lipid Panel  Lab Results   Component Value Date    CHOL 127 01/23/2023    TRIG 77 01/23/2023    HDL 49 01/23/2023    LDL 63 01/23/2023    AST 15 08/15/2024    ALT 13 08/15/2024       Medication Review: Medications reviewed and noted  Patient Instructions   Problem List Items Addressed This Visit          Cardiac and Vasculature    Hypertension    Relevant Medications    chlorthalidone (HYGROTON) 25 MG tablet    losartan (COZAAR) 100 MG tablet       Pulmonary and Pneumonias    Stage 2 moderate COPD by GOLD classification    Relevant Medications    albuterol sulfate  (90 Base) MCG/ACT inhaler    Budeson-Glycopyrrol-Formoterol (Breztri Aerosphere) 160-9-4.8 MCG/ACT aerosol inhaler    Shortness of breath    Overview     Cannot rule out pneumonia.  O2 in the office 92  Chest xray today  CBC and CMP  ordered.  She has completed z pack and prednisone.  Will send in doxycycline.  Tessalon perles at night.            Symptoms and Signs    Fever and chills       Other    Acute cough - Primary          Diagnosis Plan   1. Acute cough        2. Shortness of breath        3. Fever and chills        4. Stage 2 moderate COPD by GOLD classification        5. Primary hypertension          Assessment & Plan  1. Acute cough.  Shortness of breath.  Fever and chills.  She presents on video visit with an acute cough with shortness of breath and head and chest congestion for a week. She did have fever and chills about 5 days ago but not now. She has not done any home testing for COVID-19 or influenza. She does appear ill on video but does not appear septic or in acute distress. Her blood pressure is on the high side, so this is not consistent with sepsis. She does have a history of MAC and COPD and sees pulmonologist, Dr. Karen Irwin. Since she is high risk and I can not listen to her lungs or do any testing on video, I sent Augmentin to her pharmacy so she can start on that. She will take Augmentin 875 mg twice a day for 7 days. She is advised to continue taking plain Mucinex twice a day. She may also take over-the-counter Delsym cough syrup as needed. She will continue taking her Breztri inhaler twice a day and using her albuterol rescue inhaler as needed for shortness of breath, wheeze, or cough. She is advised to make an appointment to see someone in the office on Thursday, which is in 2 days, so her lungs can be examined and testing can be done if there is no improvement.    Chronic obstructive pulmonary disease (COPD).  She will continue using her Breztri inhaler twice a day and her albuterol inhaler as needed.  She will follow-up with Dr. Karen Irwin soon.     Primary hypertension.  She will continue taking chlorthalidone and losartan daily. The goal is to achieve a blood pressure closer to 120/70. She is advised to  record her blood pressure values at home and bring them to her next appointment with Dr. Zaragoza.         Plan of care reviewed with patient at the conclusion of today's visit. Education was provided regarding diagnosis, management, and any prescribed or recommended OTC medications. Patient verbalizes understanding of and agreement with management plan.         12/10/24   12:52 EST    Patient or patient representative verbalized consent for the use of Ambient Listening during the visit with  Alta Sanchez MD for chart documentation. 12/10/2024  12:54 EST

## 2024-12-10 NOTE — TELEPHONE ENCOUNTER
PATIENT HAS CALLED AND STATED SHE HAS TESTED POSITIVE FOR COVID. PATIENT WAS SEEN BY VIDEO BY DR. MANN AND PRESCRIPTION FOR AUGMENTIN WAS CALLED INTO PHARMACY PRIOR TO DR. MANN KNOWING OF COVID RESULT. PATIENT IS REQUESTING CALL BACK ASAP TO LET HER KNOW IF SHE SHOULD STILL  THE AUGMENTIN.     CALL BACK NUMBER [phone]

## 2024-12-11 NOTE — PATIENT INSTRUCTIONS
Patient Instructions   Problem List Items Addressed This Visit          Cardiac and Vasculature    Hypertension    Relevant Medications    chlorthalidone (HYGROTON) 25 MG tablet    losartan (COZAAR) 100 MG tablet       Pulmonary and Pneumonias    Stage 2 moderate COPD by GOLD classification    Relevant Medications    albuterol sulfate  (90 Base) MCG/ACT inhaler    Budeson-Glycopyrrol-Formoterol (Breztri Aerosphere) 160-9-4.8 MCG/ACT aerosol inhaler    Shortness of breath    Overview     Cannot rule out pneumonia.  O2 in the office 92  Chest xray today  CBC and CMP ordered.  She has completed z pack and prednisone.  Will send in doxycycline.  Tessalon perles at night.            Symptoms and Signs    Fever and chills       Other    Acute cough - Primary

## 2024-12-12 ENCOUNTER — TELEPHONE (OUTPATIENT)
Dept: INTERNAL MEDICINE | Facility: CLINIC | Age: 69
End: 2024-12-12
Payer: COMMERCIAL

## 2024-12-12 RX ORDER — BENZONATATE 100 MG/1
100 CAPSULE ORAL 3 TIMES DAILY PRN
Qty: 30 CAPSULE | Refills: 0 | Status: SHIPPED | OUTPATIENT
Start: 2024-12-12

## 2024-12-12 NOTE — TELEPHONE ENCOUNTER
Symptoms started on Saturday. She Tested Positive on Tuesday.  She said the congestion has gotten a little better but the cough is the worst part and that its very deep. She had taken some Mucinex and Delsym to help some. I let her know that you sent in the prescription.

## 2024-12-12 NOTE — TELEPHONE ENCOUNTER
Caller: Agustina Reyes    Relationship: Self    Best call back number: [phone]     What medication are you requesting: SOMETHING FOR COUGH      If a prescription is needed, what is your preferred pharmacy and phone number: [id number] - [place], [place] - [address] - [phone]  - [phone] FX     Additional notes: PATIENT TESTED POSITIVE FOR COVID. SHE WOULD LIKE TO HAVE SOMETHING CALLED IN FOR HER COUGH. SHE IS CURRENTLY TAKING DELSYM AND MUCINEX, BUT THE DELSYM IS NOT WORKING.

## 2024-12-16 RX ORDER — HYDROCODONE POLISTIREX AND CHLORPHENIRAMINE POLISTIREX 10; 8 MG/5ML; MG/5ML
5 SUSPENSION, EXTENDED RELEASE ORAL EVERY 12 HOURS PRN
Qty: 180 ML | Refills: 0 | Status: SHIPPED | OUTPATIENT
Start: 2024-12-16

## 2024-12-16 NOTE — TELEPHONE ENCOUNTER
Pt states the new symptoms began on Wednesday.  She is feeling a little better and now has her sense of taste back.  The rash has scabbed over, still a little itchy and using calamine lotion.  Phlegm is still green, she is using Delsym with some relief.  Bowel movements are getting better.  States she feels wheezy when she lays down.  She has been using her inhalers and her O2 is 93-94%.  BP has been normal, denies shortness of breath.  Please advise.

## 2024-12-16 NOTE — TELEPHONE ENCOUNTER
Caller: Agustina Reyes    Relationship: Self    Best call back number: [phone]    What medication are you requesting: PT NOT SURE, PT DOES NOT WANT TO COME IN FOR APPT, PT TEST POSITIVE FOR LAST TUESDAY    What are your current symptoms: NASAL COUGH UNTIL SHE GAGS, SEVERE DIARRHEA AND RASH        [id number] [place] [address] AT [address] - [phone] [facility] [phone] FX

## 2024-12-18 ENCOUNTER — OFFICE VISIT (OUTPATIENT)
Dept: INTERNAL MEDICINE | Facility: CLINIC | Age: 69
End: 2024-12-18
Payer: COMMERCIAL

## 2024-12-18 ENCOUNTER — TELEPHONE (OUTPATIENT)
Dept: INTERNAL MEDICINE | Facility: CLINIC | Age: 69
End: 2024-12-18
Payer: COMMERCIAL

## 2024-12-18 VITALS
TEMPERATURE: 97.1 F | DIASTOLIC BLOOD PRESSURE: 78 MMHG | SYSTOLIC BLOOD PRESSURE: 130 MMHG | HEIGHT: 66 IN | WEIGHT: 195.2 LBS | OXYGEN SATURATION: 96 % | BODY MASS INDEX: 31.37 KG/M2 | HEART RATE: 64 BPM

## 2024-12-18 DIAGNOSIS — U07.1 COVID-19 VIRUS INFECTION: Primary | ICD-10-CM

## 2024-12-18 DIAGNOSIS — J44.1 COPD WITH EXACERBATION: ICD-10-CM

## 2024-12-18 DIAGNOSIS — R05.1 ACUTE COUGH: ICD-10-CM

## 2024-12-18 PROCEDURE — 99214 OFFICE O/P EST MOD 30 MIN: CPT | Performed by: STUDENT IN AN ORGANIZED HEALTH CARE EDUCATION/TRAINING PROGRAM

## 2024-12-18 RX ORDER — DOXYCYCLINE 100 MG/1
100 CAPSULE ORAL 2 TIMES DAILY
Qty: 20 CAPSULE | Refills: 0 | Status: SHIPPED | OUTPATIENT
Start: 2024-12-18 | End: 2024-12-28

## 2024-12-18 RX ORDER — DEXTROMETHORPHAN HYDROBROMIDE AND PROMETHAZINE HYDROCHLORIDE 15; 6.25 MG/5ML; MG/5ML
5 SYRUP ORAL 4 TIMES DAILY PRN
Qty: 240 ML | Refills: 0 | Status: SHIPPED | OUTPATIENT
Start: 2024-12-18

## 2024-12-18 RX ORDER — PREDNISONE 10 MG/1
TABLET ORAL
Qty: 13 TABLET | Refills: 0 | Status: SHIPPED | OUTPATIENT
Start: 2024-12-18

## 2024-12-18 NOTE — TELEPHONE ENCOUNTER
Caller: Agustina Reyes    Relationship: Self    Best call back number: [phone]     What medication are you requesting: SOMETHING FOR SYMPTOMS     What are your current symptoms: COUGH     How long have you been experiencing symptoms: A WEEK     Have you had these symptoms before:    [x] Yes  [] No    Have you been treated for these symptoms before:   [x] Yes  [] No    If a prescription is needed, what is your preferred pharmacy and phone number: [facility] #[id number] [place] [address] - [phone]  - [phone] FX     Additional notes: PATIENT STATES SHE HAS BEEN PRESCRIBED SOMETHING FOR THIS BUT IT IS NOT HELPING.

## 2024-12-18 NOTE — PROGRESS NOTES
"Chief Complaint  Agustina Reyes is a 69 y.o. female presenting for Cough, covid positive  (Last week on tuesday), and Shortness of Breath.     Patient has a past medical history of hypertension, hyperlipidemia, seasonal allergies, sinusitis, occipital neuralgia, CVA, migraine with aura, lung nodule, COPD and GERD.    History of Present Illness  Patient is here for acute visit due to no improvement.    Symptoms initially started on 12/7/2024, she tested positive for COVID 3 days later.  She had eye telehealth visit with Dr. Sanchez and was prescribed Augmentin, however when she was positive for COVID it was recommended not to start the Augmentin.    Patient is reporting continued shortness of breath, wheezing, rattling of her chest.  It is hard for her to lay flat.  Normal fever chills, no appetite.  Her taste has come back and her nasal congestion has improved.  She is able to move around, but still feels out of breath, SpO2 at home typically around 93%.  She has stayed home since this started on 12/7/2024.  She has been taking hydrocodone for cough, with limited help.  Also tried Tessalon.  She has not been on steroids recently.    The following portions of the patient's history were reviewed and updated as appropriate: allergies, current medications, past family history, past medical history, past social history, past surgical history, and problem list.    Objective  /78 (BP Location: Left arm, Patient Position: Sitting, Cuff Size: Adult)   Pulse 64   Temp 97.1 °F (36.2 °C) (Temporal)   Ht 167.6 cm (65.98\")   Wt 88.5 kg (195 lb 3.2 oz)   LMP  (LMP Unknown)   SpO2 96%   BMI 31.52 kg/m²     Physical Exam  Vitals reviewed.   Constitutional:       General: She is not in acute distress.     Appearance: Normal appearance. She is ill-appearing. She is not toxic-appearing or diaphoretic.   HENT:      Head: Normocephalic and atraumatic.      Nose: Nose normal. Congestion present.   Eyes:      Extraocular " Movements: Extraocular movements intact.      Conjunctiva/sclera: Conjunctivae normal.   Cardiovascular:      Rate and Rhythm: Normal rate and regular rhythm.      Heart sounds: Normal heart sounds. No murmur heard.  Pulmonary:      Effort: Pulmonary effort is normal. No respiratory distress.      Breath sounds: Wheezing present.      Comments: Fairly good air movement bilaterally, no focal findings, few mild wheezes heard.  Significant cough throughout the visit.  Musculoskeletal:      Cervical back: Neck supple.   Skin:     General: Skin is warm and dry.   Neurological:      Mental Status: She is alert and oriented to person, place, and time. Mental status is at baseline.   Psychiatric:         Behavior: Behavior normal.         Thought Content: Thought content normal.         Assessment/Plan   1. COVID-19 virus infection  2. COPD with exacerbation  Likely not contagious anymore in regard to her COVID infection, but concern for COPD exacerbation.  Continued wheezing and shortness of breath.  Reassuringly her SpO2 in the office is good at 96%.  She is not tachycardic or in respiratory distress, so at this point I am not concerned for PE.  Will do trial of steroids and doxycycline at this point to cover for secondary infection.  But any worsening symptoms he should get back in touch.  - doxycycline (VIBRAMYCIN) 100 MG capsule; Take 1 capsule by mouth 2 (Two) Times a Day for 10 days.  Dispense: 20 capsule; Refill: 0  - predniSONE (DELTASONE) 10 MG tablet; 40 mg day 1-2, 20 mg day 3-4, 10 mg day 5  Dispense: 13 tablet; Refill: 0    3. Acute cough  She coughs a lot at night, we can try Promethazine DM instead of Tessalon or hydrocodone.  Patient can use medication that works best.  - promethazine-dextromethorphan (PROMETHAZINE-DM) 6.25-15 MG/5ML syrup; Take 5 mL by mouth 4 (Four) Times a Day As Needed for Cough.  Dispense: 240 mL; Refill: 0      Return if symptoms worsen or fail to improve, for Next scheduled follow  up.    Future Appointments         Provider Department Center    1/30/2025 2:45 PM Alex Josue MD [facility] CARDIOLOGY [facility]    2/13/2025 10:15 AM Rich Zaragoza MD [facility] INTERNAL MEDICINE [facility]    2/17/2025 1:45 PM RAD TECH [facility] PULMONARY & CRITICAL CARE MEDICINE [facility]    2/17/2025 2:00 PM [facility], PFT LAB 1 [facility] PULMONARY & CRITICAL CARE MEDICINE [facility]    2/17/2025 2:30 PM Aria Irwin DO [facility] PULMONARY & CRITICAL CARE MEDICINE [facility]              Brody West MD  Family Medicine  12/18/2024

## 2024-12-30 ENCOUNTER — OFFICE VISIT (OUTPATIENT)
Dept: INTERNAL MEDICINE | Facility: CLINIC | Age: 69
End: 2024-12-30
Payer: COMMERCIAL

## 2024-12-30 VITALS
WEIGHT: 187 LBS | SYSTOLIC BLOOD PRESSURE: 142 MMHG | OXYGEN SATURATION: 96 % | DIASTOLIC BLOOD PRESSURE: 62 MMHG | TEMPERATURE: 98 F | HEART RATE: 68 BPM | BODY MASS INDEX: 30.05 KG/M2 | HEIGHT: 66 IN

## 2024-12-30 DIAGNOSIS — R05.3 PERSISTENT COUGH: ICD-10-CM

## 2024-12-30 DIAGNOSIS — U07.1 COVID-19 VIRUS INFECTION: Primary | ICD-10-CM

## 2024-12-30 DIAGNOSIS — J44.1 COPD WITH EXACERBATION: ICD-10-CM

## 2024-12-30 DIAGNOSIS — R09.81 SINUS CONGESTION: ICD-10-CM

## 2024-12-30 DIAGNOSIS — R04.0 RECURRENT EPISTAXIS: ICD-10-CM

## 2024-12-30 PROCEDURE — 99214 OFFICE O/P EST MOD 30 MIN: CPT | Performed by: INTERNAL MEDICINE

## 2024-12-30 RX ORDER — ALBUTEROL SULFATE 90 UG/1
2 INHALANT RESPIRATORY (INHALATION) EVERY 4 HOURS PRN
COMMUNITY

## 2024-12-30 RX ORDER — PREDNISONE 10 MG/1
20 TABLET ORAL DAILY
Qty: 8 TABLET | Refills: 0 | Status: SHIPPED | OUTPATIENT
Start: 2024-12-30 | End: 2025-01-03

## 2024-12-30 NOTE — PROGRESS NOTES
[facility]     Agustina Reyes  1955   [id number]      Patient Care Team:  Rich Zaragoza MD as PCP - General (Internal Medicine)  Rich Zaragoza MD as PCP - Internal Medicine (Internal Medicine)  China Ivey APRN as Referring Physician (Neurology)  Paco Bernal MD as Consulting Physician (Cardiology)  Eusebia Gomes APRN as Nurse Practitioner (Pulmonary Disease)  Case, Aria FARAH DO as Consulting Physician (Pulmonary Disease)    Chief Complaint   Patient presents with    Cough    Fatigue    Headache    Nasal Congestion        Patient is a 69 y.o. female.   History of Present Illness  The patient presents for an acute visit for a persistent cough.    She has been experiencing this cough since her telehealth consultation on 12/10/2024. During that consultation, she had not yet undergone COVID-19 testing. However, following a positive COVID-19 test result, she was advised to discontinue Augmentin. She sought further medical attention from Dr. Johnson on 12/18/2024 due to the severity of her cough, which was subsequently managed with prednisone, doxycycline, and promethazine DM. This treatment regimen significantly improved her symptoms, allowing her to sleep. Despite the improvement, she continues to experience a cough, hoarseness, and very mild nosebleeds, which she attributes to either prolonged use of Eliquis or sinus issues. She had experienced them once or twice prior to this illness. She reports persistent head congestion, clear nasal discharge, and difficulty expectorating. She also experiences pressure in her cheeks and forehead, postnasal drainage, and chest discomfort. Her dyspnea and wheeze has improved, and she continues to use Proventil daily, which aids in expectoration during coughing episodes. She is using Breztri inhaler twice a day. She completed a 5-day course of prednisone and finished her doxycycline two days ago. She resumed work  yesterday, which was challenging due to her need to stand for extended periods. She works in cosmetics and interacts with others, wearing a mask. She does not recall coughing while working. She reports feeling a lot better than before starting antibiotics and prednisone but still unwell. She describes her voice as scratchy from coughing. She  discontinued Mucinex despite ongoing congestion. She reports no sore throat, fever, chills, or ear pain. Her fatigue has improved, and her appetite has increased. She acknowledges dehydration and does not use Flonase or other nasal sprays. She reports nasal dryness and a runny nose yesterday. She did not take her cough syrup the previous night as it causes grogginess, and she plans to return to work.      She requests [facility] paperwork for 12/19/2024 to 12/29/2024.    MEDICATIONS  Current: Eliquis, Proventil, Breztri, promethazine DM  Discontinued: Augmentin, Mucinex, Tussionex         CHRONIC CONDITIONS      Past Medical History:   Diagnosis Date    Cataract 2017    COPD (chronic obstructive pulmonary disease) May2020    Deep vein thrombosis April2024    GERD (gastroesophageal reflux disease)     Hyperlipidemia     Hypertension     Migraines     Occipital neuralgia of left side     Pneumonia     PONV (postoperative nausea and vomiting)     Shingles     Stroke 2014    no residual effects    Umbilical hernia        Past Surgical History:   Procedure Laterality Date    BRONCHOSCOPY N/A 8/19/2024    Procedure: BRONCHOSCOPY WITH BRONCHOALVEOLAR LAVAGE;  Surgeon: Aria Irwin DO;  Location: [facility] ENDOSCOPY;  Service: Pulmonary;  Laterality: N/A;    CATARACT EXTRACTION Bilateral     COLONOSCOPY      FOOT SURGERY Left     x4    HYSTERECTOMY  2000    SUBTOTAL HYSTERECTOMY         Family History   Problem Relation Age of Onset    Heart disease Mother     Stroke Mother     Hypertension Mother     Heart disease Father     Hypertension Father     Hearing loss Father     Stroke Maternal  "Grandfather     No Known Problems Sister     Breast cancer Neg Hx     Ovarian cancer Neg Hx        Social History     Socioeconomic History    Marital status: Single   Tobacco Use    Smoking status: Former     Current packs/day: 0.00     Average packs/day: 1 pack/day for 40.0 years (40.0 ttl pk-yrs)     Types: Cigarettes     Start date: 8/4/1974     Quit date: 8/4/2014     Years since quitting: 10.4    Smokeless tobacco: Never   Vaping Use    Vaping status: Never Used   Substance and Sexual Activity    Alcohol use: Yes     Comment: occ.    Drug use: No    Sexual activity: Defer       Allergies   Allergen Reactions    Erythromycin GI Intolerance     Nausea      Amlodipine Other (See Comments)     Pt states she does not remember an allergy to this medicine, states her legs were swelling    Lisinopril Other (See Comments)     Joint pain    Sulfa Antibiotics Unknown (See Comments)     Pt's home pharmacy has sulfa drugs listed as an allergy.       Review of Systems:     Review of Systems    Vital Signs  Vitals:    12/30/24 1522   BP: 142/62   BP Location: Left arm   Patient Position: Sitting   Cuff Size: Adult   Pulse: 68   Temp: 98 °F (36.7 °C)   TempSrc: Infrared   SpO2: 96%   Weight: 84.8 kg (187 lb)   Height: 167.6 cm (65.98\")   PainSc: 0-No pain     Body mass index is 30.2 kg/m².  BMI is >= 30 and <35. (Class 1 Obesity). The following options were offered after discussion;: exercise counseling/recommendations and nutrition counseling/recommendations          Current Outpatient Medications:     aspirin 81 MG tablet, Take 1 tablet by mouth Daily., Disp: , Rfl:     atorvastatin (LIPITOR) 40 MG tablet, Take 1 tablet by mouth Daily., Disp: 90 tablet, Rfl: 3    Budeson-Glycopyrrol-Formoterol (Breztri Aerosphere) 160-9-4.8 MCG/ACT aerosol inhaler, Inhale 2 puffs 2 (Two) Times a Day., Disp: 3 each, Rfl: 3    butalbital-acetaminophen  MG tablet tablet, Take 1-2 tablets by mouth Every 6 (Six) Hours As Needed " (migraines)., Disp: 15 each, Rfl: 0    chlorthalidone (HYGROTON) 25 MG tablet, TAKE 1 TABLET BY MOUTH DAILY, Disp: 90 tablet, Rfl: 3    Eliquis 5 MG tablet tablet, TAKE 1 TABLET BY MOUTH TWICE DAILY, Disp: 60 tablet, Rfl: 2    Hydrocod Aly-Chlorphe Aly ER (TUSSIONEX PENNKINETIC) 10-8 MG/5ML ER suspension, Take 5 mL by mouth Every 12 (Twelve) Hours As Needed for Cough., Disp: 180 mL, Rfl: 0    losartan (COZAAR) 100 MG tablet, Take 1 tablet by mouth Daily., Disp: 90 tablet, Rfl: 3    prochlorperazine (COMPAZINE) 5 MG tablet, Take 1-2 tablets by mouth Every 6 (Six) Hours As Needed for Nausea or Vomiting (migraine)., Disp: 20 tablet, Rfl: 5    promethazine-dextromethorphan (PROMETHAZINE-DM) 6.25-15 MG/5ML syrup, Take 5 mL by mouth 4 (Four) Times a Day As Needed for Cough., Disp: 240 mL, Rfl: 0    albuterol sulfate  (90 Base) MCG/ACT inhaler, Inhale 2 puffs Every 4 (Four) Hours As Needed for Wheezing., Disp: , Rfl:     Physical Exam:    Physical Exam  Vitals and nursing note reviewed.   Constitutional:       Appearance: She is well-developed.   HENT:      Head: Normocephalic.   Eyes:      Conjunctiva/sclera: Conjunctivae normal.      Pupils: Pupils are equal, round, and reactive to light.   Neck:      Thyroid: No thyromegaly.   Cardiovascular:      Rate and Rhythm: Normal rate and regular rhythm.      Heart sounds: Normal heart sounds.   Pulmonary:      Effort: Pulmonary effort is normal.      Breath sounds: Normal breath sounds. No wheezing, rhonchi or rales.   Musculoskeletal:         General: Normal range of motion.      Cervical back: Normal range of motion and neck supple.   Lymphadenopathy:      Cervical: No cervical adenopathy.   Skin:     General: Skin is warm and dry.   Neurological:      Mental Status: She is alert and oriented to person, place, and time.   Psychiatric:         Thought Content: Thought content normal.        ACE III MINI        Results Review:    I reviewed the patient's new clinical  results.  Results  Laboratory Studies  COVID-19 test was positive.       CMP:  Lab Results   Component Value Date    Glucose 94 08/29/2024    Glucose, UA Negative 09/26/2024    BUN 20 08/29/2024    BUN/Creatinine Ratio 18.5 08/29/2024    Creatinine 1.08 (H) 08/29/2024    Creatinine 0.90 05/03/2021    Creatinine, Urine 14.8 04/24/2024    Ketones, UA Negative 09/26/2024    CO2 27.0 08/29/2024    Calcium 9.5 08/29/2024    Albumin 4.0 08/15/2024    Albumin 2.8 (L) 08/22/2023    AST (SGOT) 15 08/15/2024    ALT (SGPT) 13 08/15/2024     HbA1c:  Lab Results   Component Value Date    HGBA1C 6.1 (H) 08/18/2023    HGBA1C 6.10 (H) 01/23/2023     Microalbumin:  Lab Results   Component Value Date    MICROALBUR <1.2 04/24/2024     Lipid Panel  Lab Results   Component Value Date    CHOL 127 01/23/2023    TRIG 77 01/23/2023    HDL 49 01/23/2023    LDL 63 01/23/2023    AST 15 08/15/2024    ALT 13 08/15/2024       Medication Review: Medications reviewed and noted  Patient Instructions   Problem List Items Addressed This Visit    None        No diagnosis found.  Assessment & Plan  Persistent Cough.  COVID-19 virus infection  The cough is  due to COVID-19 infection with persistent cough and an exacerbation of her COPD, which can be prolonged following an infection. There is no evidence of wheezing on exam today, but it may be intermittent.  Nasal dryness is contributing to the nosebleeds. She will commence a regimen of prednisone 10 mg, taking two tablets daily for five days. [facility]. She is advised to use saline nasal spray 5 to 6 times daily and to resume Mucinex today. She will continue with her cough syrup as needed, reducing the dosage to half a teaspoon when she is concerned it might make her too sleepy. Cepacol lozenges can be used during the day.  Warm salt water gargles 2 or 3 times a day are helpful.     COPD exacerbation  She will continue Breztri inhaler twice a day and  Proventil rescue inhaler as needed.     [place] paperwork has been completed for her.         Plan of care reviewed with patient at the conclusion of today's visit. Education was provided regarding diagnosis, management, and any prescribed or recommended OTC medications. Patient verbalizes understanding of and agreement with management plan.         12/30/24   15:25 EST    Patient or patient representative verbalized consent for the use of Ambient Listening during the visit with  Alta Sanchez MD for chart documentation. 1/1/2025  16:52 EST

## 2025-01-02 NOTE — PATIENT INSTRUCTIONS
Problem List Items Addressed This Visit          ENT    Sinus congestion    Recurrent epistaxis       Pulmonary and Pneumonias    COPD with exacerbation    Relevant Medications    Budeson-Glycopyrrol-Formoterol (Breztri Aerosphere) 160-9-4.8 MCG/ACT aerosol inhaler    promethazine-dextromethorphan (PROMETHAZINE-DM) 6.25-15 MG/5ML syrup    albuterol sulfate  (90 Base) MCG/ACT inhaler    predniSONE (DELTASONE) 10 MG tablet       Other    Acute cough    Relevant Medications    promethazine-dextromethorphan (PROMETHAZINE-DM) 6.25-15 MG/5ML syrup    COVID-19 virus infection - Primary

## 2025-01-14 ENCOUNTER — TELEPHONE (OUTPATIENT)
Dept: INTERNAL MEDICINE | Facility: CLINIC | Age: 70
End: 2025-01-14

## 2025-01-14 NOTE — TELEPHONE ENCOUNTER
Caller: Agustina Reyes    Relationship: Self    Best call back number: [phone]    What form or medical record are you requesting: [facility] PAPERWORK     Who is requesting this form or medical record from you: PATIENT     How would you like to receive the form or medical records (pick-up, mail, fax): FAX OR EMAIL BACK TO PATRICIO  EMAIL [email or web address]        Additional notes: THE PATIENT STATES THAT SHE GOT A CALL FROM HER EMPLOYER THAT SAID THAT PART C OF THE FMLA FORM WAS INCOMPLETE SHE STATES THAT THEY NEED HER LEAVE DATES 12/12/2024-12/29/2024 AND IT ALSO NEEDED TO BE INTIALED SIGNED AND DATED DOCTOR JOHNATHAN ORIGINALLY FILLED OUT THE FORMS THE PATIENT WOULD LIKE A CALL TO LET HER KNOW IF THE FORMS CAN BE UPDATED

## 2025-01-27 RX ORDER — APIXABAN 5 MG/1
5 TABLET, FILM COATED ORAL 2 TIMES DAILY
Qty: 60 TABLET | Refills: 2 | Status: SHIPPED | OUTPATIENT
Start: 2025-01-27

## 2025-01-27 NOTE — TELEPHONE ENCOUNTER
Rx Refill Note  Requested Prescriptions     Pending Prescriptions Disp Refills    Eliquis 5 MG tablet tablet [Pharmacy Med Name: ELIQUIS 5MG TABLETS] 60 tablet 2     Sig: TAKE 1 TABLET BY MOUTH TWICE DAILY      Last office visit with prescribing clinician: 7/31/2024   Last telemedicine visit with prescribing clinician: 12/10/2024   Next office visit with prescribing clinician: 2/13/2025                         Would you like a call back once the refill request has been completed: [] Yes [] No    If the office needs to give you a call back, can they leave a voicemail: [] Yes [] No    Soheila Serrano MA  01/27/25, 08:28 EST

## 2025-01-30 ENCOUNTER — OFFICE VISIT (OUTPATIENT)
Dept: CARDIOLOGY | Facility: CLINIC | Age: 70
End: 2025-01-30
Payer: COMMERCIAL

## 2025-01-30 VITALS
BODY MASS INDEX: 31 KG/M2 | OXYGEN SATURATION: 97 % | HEIGHT: 66 IN | SYSTOLIC BLOOD PRESSURE: 122 MMHG | HEART RATE: 91 BPM | WEIGHT: 192.9 LBS | DIASTOLIC BLOOD PRESSURE: 62 MMHG

## 2025-01-30 DIAGNOSIS — E78.2 MIXED HYPERLIPIDEMIA: ICD-10-CM

## 2025-01-30 DIAGNOSIS — I10 PRIMARY HYPERTENSION: ICD-10-CM

## 2025-01-30 DIAGNOSIS — I25.10 CORONARY ARTERY DISEASE INVOLVING NATIVE CORONARY ARTERY OF NATIVE HEART WITHOUT ANGINA PECTORIS: Primary | ICD-10-CM

## 2025-01-30 PROCEDURE — 99214 OFFICE O/P EST MOD 30 MIN: CPT | Performed by: INTERNAL MEDICINE

## 2025-01-30 RX ORDER — VERAPAMIL HYDROCHLORIDE 240 MG/1
240 TABLET, FILM COATED, EXTENDED RELEASE ORAL NIGHTLY
COMMUNITY
Start: 2025-01-15 | End: 2025-01-30

## 2025-01-30 NOTE — PROGRESS NOTES
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    MRA of neck with and without contrast:  Focal area of the P1 segment of left posterior cerebral artery.    Echocardiogram, 2014, LVEF 60-65%.  No  thrombus in left atrial appendage. No PFO. Prominent Chiari network noted in right atrium.    Carotid duplex, 2014:  Showing no significant carotid artery stenosis bilaterally.     iversity loop recorder implant, 2014 (battery life , 2018)  Transcranial  Doppler, 2014:  Showing normal flow and wave form seen in MELODIE, NCA, terminal ICA, PCA.  Hypertension.   Pre-Diabetes:   A1c 6.1   6.1  PVD/venous dz      RLE chronic dvt femoral/popliteal  Stage II COPD- Case        CT chest infectious tree/bud opacifications       CT chest - chronic tree/bud nodularity- favoring MAC  Cluster migranes   Hyperlipidemia   136/81/49/71   127/77/49/63  Former tobacco use  Shingles  COVID-19  outpatient antibiotic prednisone  L Heel fracture/Achilles tear  surgeries [facility]      CC: Follow-up CAD    Allergies  Allergies   Allergen Reactions    Erythromycin GI Intolerance     Nausea      Amlodipine Other (See Comments)     Pt states she does not remember an allergy to this medicine, states her legs were swelling    Lisinopril Other (See Comments)     Joint pain    Sulfa Antibiotics Unknown (See Comments)     Pt's home pharmacy has sulfa drugs  "listed as an allergy.       Current Medications    Current Outpatient Medications:     albuterol sulfate  (90 Base) MCG/ACT inhaler, Inhale 2 puffs Every 4 (Four) Hours As Needed for Wheezing., Disp: , Rfl:     aspirin 81 MG tablet, Take 1 tablet by mouth Daily., Disp: , Rfl:     atorvastatin (LIPITOR) 40 MG tablet, Take 1 tablet by mouth Daily., Disp: 90 tablet, Rfl: 3    Budeson-Glycopyrrol-Formoterol (Breztri Aerosphere) 160-9-4.8 MCG/ACT aerosol inhaler, Inhale 2 puffs 2 (Two) Times a Day., Disp: 3 each, Rfl: 3    butalbital-acetaminophen  MG tablet tablet, Take 1-2 tablets by mouth Every 6 (Six) Hours As Needed (migraines)., Disp: 15 each, Rfl: 0    chlorthalidone (HYGROTON) 25 MG tablet, TAKE 1 TABLET BY MOUTH DAILY, Disp: 90 tablet, Rfl: 3    Eliquis 5 MG tablet tablet, TAKE 1 TABLET BY MOUTH TWICE DAILY, Disp: 60 tablet, Rfl: 2    losartan (COZAAR) 100 MG tablet, Take 1 tablet by mouth Daily., Disp: 90 tablet, Rfl: 3    prochlorperazine (COMPAZINE) 5 MG tablet, Take 1-2 tablets by mouth Every 6 (Six) Hours As Needed for Nausea or Vomiting (migraine)., Disp: 20 tablet, Rfl: 5      History of Present Illness   Agustina Reyes is a 69 y.o. year old female here for follow up.  No new cardiac issues but she is having to see infectious disease regarding her MAC of her lungs and there may be some concern with Eliquis with concomitant antibiotic choice    OBJECTIVE:  Vitals:    01/30/25 1449   BP: 122/62   BP Location: Left arm   Patient Position: Lying   Cuff Size: Adult   Pulse: 91   SpO2: 97%   Weight: 87.5 kg (192 lb 14.4 oz)   Height: 167.6 cm (65.98\")         Body mass index is 31.15 kg/m².    Constitutional:       Appearance: Healthy appearance. Not in distress.   Neck:      Vascular: No JVR. JVD normal.   Pulmonary:      Effort: Pulmonary effort is normal.      Breath sounds: Normal breath sounds. No wheezing. No rhonchi. No rales.   Chest:      Chest wall: Not tender to palpatation. "   Cardiovascular:      PMI at left midclavicular line. Normal rate. Regular rhythm. Normal S1. Normal S2.       Murmurs: There is no murmur.      No gallop.  No click. No rub.   Pulses:     Intact distal pulses.   Edema:     Peripheral edema absent.   Abdominal:      General: Bowel sounds are normal.      Palpations: Abdomen is soft.      Tenderness: There is no abdominal tenderness.   Musculoskeletal: Normal range of motion.         General: No tenderness. Skin:     General: Skin is warm and dry.   Neurological:      General: No focal deficit present.      Mental Status: Alert and oriented to person, place and time.         Diagnostic Data:  Procedures      ASSESSMENT:   Diagnosis Plan   1. Coronary artery disease involving native coronary artery of native heart without angina pectoris        2. Primary hypertension        3. Mixed hyperlipidemia                PLAN:  CAD as manifested by coronary calcium on CT scan continue aggressive risk factor modification medical management    Hypertension controlled on 4 drug regimen with dual calcium channel blockade of which she is tolerant    Mixed dyslipidemia controlled on statin therapy reiterated plaque stabilization indication for    DVT -chronic on systemic anticoagulation          Alex Josue MD, FACC

## 2025-02-10 ENCOUNTER — TELEPHONE (OUTPATIENT)
Dept: CARDIOLOGY | Facility: CLINIC | Age: 70
End: 2025-02-10
Payer: COMMERCIAL

## 2025-02-10 RX ORDER — ATORVASTATIN CALCIUM 40 MG/1
40 TABLET, FILM COATED ORAL DAILY
Qty: 90 TABLET | Refills: 0 | Status: SHIPPED | OUTPATIENT
Start: 2025-02-10

## 2025-02-10 NOTE — TELEPHONE ENCOUNTER
Caller: Agustina Reyes    Relationship: Self    Best call back number: [phone]     Requested Prescriptions:   Requested Prescriptions     Pending Prescriptions Disp Refills    atorvastatin (LIPITOR) 40 MG tablet 90 tablet 3     Sig: Take 1 tablet by mouth Daily.        Pharm[facility] where request should be sent: [id number] [place] [address] - [phone] [facility] [phone] FX     Last office visit with prescribing clinician: 1/30/2025   Last telemedicine visit with prescribing clinician: Visit date not found   Next office visit with prescribing clinician: Visit date not found     Additional details provided by patient:  PT IS OUT OF THIS MEDICATION     Does the patient have less than a 3 day supply:  [x] Yes  [] No    Would you like a call back once the refill request has been completed: [x] Yes [] No    If the office needs to give you a call back, can they leave a voicemail: [x] Yes [] No    Rhina May, Joao Rep   02/10/25 09:42 EST

## 2025-02-13 ENCOUNTER — LAB (OUTPATIENT)
Dept: LAB | Facility: HOSPITAL | Age: 70
End: 2025-02-13
Payer: COMMERCIAL

## 2025-02-13 ENCOUNTER — OFFICE VISIT (OUTPATIENT)
Dept: INTERNAL MEDICINE | Facility: CLINIC | Age: 70
End: 2025-02-13
Payer: COMMERCIAL

## 2025-02-13 VITALS
BODY MASS INDEX: 30.86 KG/M2 | DIASTOLIC BLOOD PRESSURE: 62 MMHG | OXYGEN SATURATION: 95 % | HEART RATE: 84 BPM | WEIGHT: 192 LBS | TEMPERATURE: 97.9 F | SYSTOLIC BLOOD PRESSURE: 154 MMHG | HEIGHT: 66 IN

## 2025-02-13 DIAGNOSIS — R25.2 LEG CRAMPS: ICD-10-CM

## 2025-02-13 DIAGNOSIS — Z00.00 ANNUAL PHYSICAL EXAM: Primary | ICD-10-CM

## 2025-02-13 DIAGNOSIS — R73.03 PREDIABETES: ICD-10-CM

## 2025-02-13 DIAGNOSIS — E78.2 MIXED HYPERLIPIDEMIA: ICD-10-CM

## 2025-02-13 DIAGNOSIS — I10 PRIMARY HYPERTENSION: ICD-10-CM

## 2025-02-13 DIAGNOSIS — M81.0 AGE-RELATED OSTEOPOROSIS WITHOUT CURRENT PATHOLOGICAL FRACTURE: ICD-10-CM

## 2025-02-13 DIAGNOSIS — I82.511 CHRONIC DEEP VEIN THROMBOSIS (DVT) OF FEMORAL VEIN OF RIGHT LOWER EXTREMITY: ICD-10-CM

## 2025-02-13 PROBLEM — R05.1 ACUTE COUGH: Status: RESOLVED | Noted: 2024-12-10 | Resolved: 2025-02-13

## 2025-02-13 PROBLEM — J01.80 OTHER ACUTE SINUSITIS: Status: RESOLVED | Noted: 2022-12-08 | Resolved: 2025-02-13

## 2025-02-13 PROBLEM — R09.81 SINUS CONGESTION: Status: RESOLVED | Noted: 2024-02-20 | Resolved: 2025-02-13

## 2025-02-13 PROBLEM — U07.1 COVID-19 VIRUS INFECTION: Status: RESOLVED | Noted: 2024-12-30 | Resolved: 2025-02-13

## 2025-02-13 LAB
25(OH)D3 SERPL-MCNC: 25.6 NG/ML (ref 30–100)
ALBUMIN SERPL-MCNC: 4.3 G/DL (ref 3.5–5.2)
ALBUMIN/GLOB SERPL: 1.4 G/DL
ALP SERPL-CCNC: 91 U/L (ref 39–117)
ALT SERPL W P-5'-P-CCNC: 11 U/L (ref 1–33)
ANION GAP SERPL CALCULATED.3IONS-SCNC: 14 MMOL/L (ref 5–15)
AST SERPL-CCNC: 13 U/L (ref 1–32)
BASOPHILS # BLD AUTO: 0.09 10*3/MM3 (ref 0–0.2)
BASOPHILS NFR BLD AUTO: 0.9 % (ref 0–1.5)
BILIRUB SERPL-MCNC: 0.3 MG/DL (ref 0–1.2)
BUN SERPL-MCNC: 19 MG/DL (ref 8–23)
BUN/CREAT SERPL: 19 (ref 7–25)
CALCIUM SPEC-SCNC: 10 MG/DL (ref 8.6–10.5)
CHLORIDE SERPL-SCNC: 103 MMOL/L (ref 98–107)
CHOLEST SERPL-MCNC: 134 MG/DL (ref 0–200)
CO2 SERPL-SCNC: 24 MMOL/L (ref 22–29)
CREAT SERPL-MCNC: 1 MG/DL (ref 0.57–1)
DEPRECATED RDW RBC AUTO: 45.9 FL (ref 37–54)
EGFRCR SERPLBLD CKD-EPI 2021: 61.1 ML/MIN/1.73
EOSINOPHIL # BLD AUTO: 0.22 10*3/MM3 (ref 0–0.4)
EOSINOPHIL NFR BLD AUTO: 2.2 % (ref 0.3–6.2)
ERYTHROCYTE [DISTWIDTH] IN BLOOD BY AUTOMATED COUNT: 15.4 % (ref 12.3–15.4)
GLOBULIN UR ELPH-MCNC: 3 GM/DL
GLUCOSE SERPL-MCNC: 97 MG/DL (ref 65–99)
HBA1C MFR BLD: 6.5 % (ref 4.8–5.6)
HCT VFR BLD AUTO: 35.9 % (ref 34–46.6)
HDLC SERPL-MCNC: 47 MG/DL (ref 40–60)
HGB BLD-MCNC: 11.8 G/DL (ref 12–15.9)
IMM GRANULOCYTES # BLD AUTO: 0.03 10*3/MM3 (ref 0–0.05)
IMM GRANULOCYTES NFR BLD AUTO: 0.3 % (ref 0–0.5)
LDLC SERPL CALC-MCNC: 72 MG/DL (ref 0–100)
LDLC/HDLC SERPL: 1.52 {RATIO}
LYMPHOCYTES # BLD AUTO: 2.29 10*3/MM3 (ref 0.7–3.1)
LYMPHOCYTES NFR BLD AUTO: 22.9 % (ref 19.6–45.3)
MAGNESIUM SERPL-MCNC: 2 MG/DL (ref 1.6–2.4)
MCH RBC QN AUTO: 27.3 PG (ref 26.6–33)
MCHC RBC AUTO-ENTMCNC: 32.9 G/DL (ref 31.5–35.7)
MCV RBC AUTO: 82.9 FL (ref 79–97)
MONOCYTES # BLD AUTO: 0.56 10*3/MM3 (ref 0.1–0.9)
MONOCYTES NFR BLD AUTO: 5.6 % (ref 5–12)
NEUTROPHILS NFR BLD AUTO: 6.83 10*3/MM3 (ref 1.7–7)
NEUTROPHILS NFR BLD AUTO: 68.1 % (ref 42.7–76)
NRBC BLD AUTO-RTO: 0 /100 WBC (ref 0–0.2)
PLATELET # BLD AUTO: 302 10*3/MM3 (ref 140–450)
PMV BLD AUTO: 10.2 FL (ref 6–12)
POTASSIUM SERPL-SCNC: 3.8 MMOL/L (ref 3.5–5.2)
PROT SERPL-MCNC: 7.3 G/DL (ref 6–8.5)
RBC # BLD AUTO: 4.33 10*6/MM3 (ref 3.77–5.28)
SODIUM SERPL-SCNC: 141 MMOL/L (ref 136–145)
TRIGL SERPL-MCNC: 77 MG/DL (ref 0–150)
VLDLC SERPL-MCNC: 15 MG/DL (ref 5–40)
WBC NRBC COR # BLD AUTO: 10.02 10*3/MM3 (ref 3.4–10.8)

## 2025-02-13 PROCEDURE — 80053 COMPREHEN METABOLIC PANEL: CPT

## 2025-02-13 PROCEDURE — 83036 HEMOGLOBIN GLYCOSYLATED A1C: CPT

## 2025-02-13 PROCEDURE — 99397 PER PM REEVAL EST PAT 65+ YR: CPT | Performed by: INTERNAL MEDICINE

## 2025-02-13 PROCEDURE — 36415 COLL VENOUS BLD VENIPUNCTURE: CPT

## 2025-02-13 PROCEDURE — 83735 ASSAY OF MAGNESIUM: CPT

## 2025-02-13 PROCEDURE — 85025 COMPLETE CBC W/AUTO DIFF WBC: CPT

## 2025-02-13 PROCEDURE — 82306 VITAMIN D 25 HYDROXY: CPT

## 2025-02-13 PROCEDURE — 80061 LIPID PANEL: CPT

## 2025-02-13 NOTE — PROGRESS NOTES
[facility]     Agustina Reyes  1955   [id number]      Patient Care Team:  Rich Zaragoza MD as PCP - General (Internal Medicine)  Rich Zaragoza MD as PCP - Internal Medicine (Internal Medicine)  China Ivey APRN as Referring Physician (Neurology)  Paco Bernal MD as Consulting Physician (Cardiology)  Eusebia Gomes APRN as Nurse Practitioner (Pulmonary Disease)  Aria Irwin DO as Consulting Physician (Pulmonary Disease)  Alex Josue MD as Consulting Physician (Cardiology)    Chief Complaint::   Chief Complaint   Patient presents with    Hypertension    Hyperlipidemia        HPI  History of Present Illness  The patient is a 69-year-old female who presents for an annual examination and follow-up of hypertension, hyperlipidemia, prediabetes, and chronic lower extremity DVT. She sees Cardiology for coronary artery disease and has a history of stroke, and Pulmonology for COPD and Mycobacterium complex.    She continues her regimen of Eliquis for the management of blood clots. She has scheduled blood work following this appointment. She has been under the care of Dr. Esteban, who has identified calcifications in her heart, although no progression has been noted. She has expressed interest in understanding her calcium score. She has been on a stroke protection regimen for the past decade.    She experienced a stroke approximately 10 years ago but has not reported any subsequent issues. She is currently on aspirin therapy.    Her blood pressure was within normal limits during her visit to Dr. Esteban's office. She has requested that her blood pressure be rechecked during this visit.    She has recovered from a recent COVID-19 infection but continues to experience rhinorrhea and productive cough. She has an upcoming appointment with Dr. Irwin in 02/2025.    She experiences severe leg cramps at night, occurring at least twice a week. She is uncertain if these  cramps are due to dehydration, dietary factors, or prolonged standing on concrete surfaces. She has been performing exercises to alleviate the cramps, which have been a long-standing issue but have recently worsened. The cramps typically occur an hour after she falls asleep. She has found some relief from drinking pickle juice, which seems to shorten the duration of the cramps and prevent their recurrence for the rest of the night.    She has expressed interest in seeing a dermatologist due to her history of sun exposure. She had a dermatological evaluation several years ago but was dissatisfied with the experience.    MEDICATIONS  Current: Eliquis, atorvastatin, aspirin, losartan, chlorthalidone    Chronic Conditions:      Patient Active Problem List   Diagnosis    Occipital neuralgia of left side    Hypertension    Migraine with aura and without status migrainosus, not intractable    Hyperlipidemia    Occlusion and stenosis of left posterior cerebral artery    History of CVA (cerebrovascular accident)    Prediabetes    Stage 2 moderate COPD by GOLD classification    Abnormal CT of the chest    Non-seasonal allergic rhinitis    Gastroesophageal reflux disease with esophagitis without hemorrhage    Incidental lung nodule, greater than or equal to 8mm    Shortness of breath    Fever and chills    Recurrent epistaxis    COPD with exacerbation        Past Medical History:   Diagnosis Date    Cataract 2017    COPD (chronic obstructive pulmonary disease) May2020    COVID-19 virus infection 12/30/2024    Deep vein thrombosis April2024    GERD (gastroesophageal reflux disease)     Hyperlipidemia     Hypertension     Migraines     Occipital neuralgia of left side     Pneumonia     PONV (postoperative nausea and vomiting)     Shingles     Stroke 2014    no residual effects    Umbilical hernia        Past Surgical History:   Procedure Laterality Date    BRONCHOSCOPY N/A 8/19/2024    Procedure: BRONCHOSCOPY WITH  BRONCHOALVEOLAR LAVAGE;  Surgeon: Aria Irwin DO;  Location: [facility] ENDOSCOPY;  Service: Pulmonary;  Laterality: N/A;    CATARACT EXTRACTION Bilateral     COLONOSCOPY      FOOT SURGERY Left     x4    HYSTERECTOMY  2000    SUBTOTAL HYSTERECTOMY         Family History   Problem Relation Age of Onset    Heart disease Mother     Stroke Mother     Hypertension Mother     Heart disease Father     Hypertension Father     Hearing loss Father     Stroke Maternal Grandfather     No Known Problems Sister     Breast cancer Neg Hx     Ovarian cancer Neg Hx        Social History     Socioeconomic History    Marital status: Single   Tobacco Use    Smoking status: Former     Current packs/day: 0.00     Average packs/day: 1 pack/day for 40.0 years (40.0 ttl pk-yrs)     Types: Cigarettes     Start date: 8/4/1974     Quit date: 8/4/2014     Years since quitting: 10.5     Passive exposure: Past    Smokeless tobacco: Never   Vaping Use    Vaping status: Never Used   Substance and Sexual Activity    Alcohol use: Yes     Comment: occ.    Drug use: No    Sexual activity: Defer       Allergies   Allergen Reactions    Erythromycin GI Intolerance     Nausea      Amlodipine Other (See Comments)     Pt states she does not remember an allergy to this medicine, states her legs were swelling    Lisinopril Other (See Comments)     Joint pain    Sulfa Antibiotics Unknown (See Comments)     Pt's home pharmacy has sulfa drugs listed as an allergy.         Current Outpatient Medications:     albuterol sulfate  (90 Base) MCG/ACT inhaler, Inhale 2 puffs Every 4 (Four) Hours As Needed for Wheezing., Disp: , Rfl:     aspirin 81 MG tablet, Take 1 tablet by mouth Daily., Disp: , Rfl:     atorvastatin (LIPITOR) 40 MG tablet, Take 1 tablet by mouth Daily. Pt needs annual lab work for future refills pls ., Disp: 90 tablet, Rfl: 0    Budeson-Glycopyrrol-Formoterol (Breztri Aerosphere) 160-9-4.8 MCG/ACT aerosol inhaler, Inhale 2 puffs 2 (Two) Times a  Day., Disp: 3 each, Rfl: 3    butalbital-acetaminophen  MG tablet tablet, Take 1-2 tablets by mouth Every 6 (Six) Hours As Needed (migraines)., Disp: 15 each, Rfl: 0    chlorthalidone (HYGROTON) 25 MG tablet, TAKE 1 TABLET BY MOUTH DAILY, Disp: 90 tablet, Rfl: 3    Eliquis 5 MG tablet tablet, TAKE 1 TABLET BY MOUTH TWICE DAILY, Disp: 60 tablet, Rfl: 2    losartan (COZAAR) 100 MG tablet, Take 1 tablet by mouth Daily., Disp: 90 tablet, Rfl: 3    prochlorperazine (COMPAZINE) 5 MG tablet, Take 1-2 tablets by mouth Every 6 (Six) Hours As Needed for Nausea or Vomiting (migraine)., Disp: 20 tablet, Rfl: 5    Immunization History   Administered Date(s) Administered    COVID-19 (PFIZER) 12YRS+ (COMIRNATY) 10/12/2023    COVID-19 (PFIZER) BIVALENT 12+YRS 10/24/2022    COVID-19 (PFIZER) Purple Cap Monovalent 02/15/2021, 03/09/2021, 03/27/2021, 10/06/2021, 11/22/2021    Fluzone  >6mos 03/10/2019, 08/28/2020    Fluzone High-Dose 65+YRS 10/14/2024    Fluzone High-Dose 65+yrs 10/06/2021, 10/24/2022, 10/12/2023    Fluzone Quad >6mos (Multi-dose) 03/10/2019, 11/22/2021    Pneumococcal, Unspecified 05/05/2021    Shingrix 12/15/2019, 02/09/2020    Tdap 08/17/2023        Health Maintenance Due   Topic Date Due    Pneumococcal Vaccine 50+ (1 of 2 - PCV) 08/07/1974    HEPATITIS C SCREENING  Never done    PAP SMEAR  Never done    MAMMOGRAM  07/30/2021    ANNUAL PHYSICAL  06/15/2024    COVID-19 Vaccine (8 - 2024-25 season) 09/01/2024        Review of Systems   Constitutional: Negative.    Respiratory: Negative.  Negative for chest tightness and shortness of breath.    Cardiovascular: Negative.  Negative for chest pain.   Gastrointestinal:  Negative for abdominal pain, blood in stool, constipation and diarrhea.        Vital Signs  Vitals:    02/13/25 1016   BP: 154/62   BP Location: Left arm   Patient Position: Sitting   Cuff Size: Adult   Pulse: 84   Temp: 97.9 °F (36.6 °C)   TempSrc: Infrared   SpO2: 95%   Weight: 87.1 kg (192 lb)  "  Height: 167.6 cm (65.98\")   PainSc: 0-No pain       Physical Exam  Vitals reviewed.   Constitutional:       Appearance: She is well-developed.   HENT:      Head: Normocephalic and atraumatic.   Neck:      Thyroid: No thyromegaly.      Vascular: No carotid bruit.   Cardiovascular:      Rate and Rhythm: Normal rate and regular rhythm.      Heart sounds: Normal heart sounds. No murmur heard.     No friction rub. No gallop.   Pulmonary:      Effort: Pulmonary effort is normal.      Breath sounds: Normal breath sounds.   Musculoskeletal:      Cervical back: Normal range of motion and neck supple.      Right lower leg: No edema.      Left lower leg: No edema.   Lymphadenopathy:      Cervical: No cervical adenopathy.   Skin:     General: Skin is warm and dry.   Neurological:      Mental Status: She is oriented to person, place, and time.      Cranial Nerves: No cranial nerve deficit.      Motor: No weakness.      Gait: Gait normal.   Psychiatric:         Mood and Affect: Mood normal.         Behavior: Behavior normal.        Physical Exam      Procedures     Fall Risk Screen:  STEADI Fall Risk Assessment was completed, and patient is at LOW risk for falls.Assessment completed on:2/13/2025    Health Habits and Functional and Cognitive Screening:       No data to display                Depression [place]  PHQ-9 Total Score:      ACE III MINI             Assessment/Plan:      Assessment & Plan  1. Health maintenance.  Her overall health status is satisfactory. She has successfully recovered from a recent COVID-19 infection, although she continues to experience residual chest congestion and cough, likely due to her pre-existing pulmonary conditions, which are being managed by a pulmonologist. She is currently on aspirin therapy. She is overdue for her mammography screening, which has been recommended. Her colorectal cancer screening is up-to-date.    2. Hypertension.  Her blood pressure readings were elevated during today's " visit, but they have been effectively managed with losartan and chlorthalidone. She has been advised to monitor her blood pressure at home while in a rested state, with a target reading of 130/80 or below.    3. Hyperlipidemia.  Her lipid panel results are pending. Given her history of cardiac calcifications and stroke, she will continue her high-intensity atorvastatin therapy.    4. Prediabetes.  Her A1c results are pending. Her treatment plan includes maintaining a healthy diet and avoiding weight gain.    5. Leg cramps.  She has been advised to try over-the-counter magnesium 400 mg at bedtime.    6. Chronic right lower extremity deep vein thrombosis (DVT).  She is currently on apixaban. A repeat ultrasound is scheduled for this month.    Follow-up  The patient will follow up in 1 year.      Labs  Results      Counseling was given to patient for the following topics: appropriate exercise 150 minutes per week, disease prevention, and healthy eating habits.    Plan of care reviewed with patient at the conclusion of today's visit. Education was provided regarding diagnosis, management, and any prescribed or recommended OTC medications.Patient verbalizes understanding of and agreement with management plan.     Patient or patient representative verbalized consent for the use of Ambient Listening during the visit with  Rich Zaragoza MD for chart documentation. 2/17/2025  14:03 MICHAEL Zaragoza MD

## 2025-02-17 ENCOUNTER — TELEPHONE (OUTPATIENT)
Dept: INTERNAL MEDICINE | Facility: CLINIC | Age: 70
End: 2025-02-17
Payer: COMMERCIAL

## 2025-02-17 ENCOUNTER — OFFICE VISIT (OUTPATIENT)
Dept: PULMONOLOGY | Facility: CLINIC | Age: 70
End: 2025-02-17
Payer: COMMERCIAL

## 2025-02-17 VITALS
DIASTOLIC BLOOD PRESSURE: 78 MMHG | TEMPERATURE: 96.8 F | OXYGEN SATURATION: 96 % | WEIGHT: 193 LBS | BODY MASS INDEX: 31.02 KG/M2 | HEIGHT: 66 IN | SYSTOLIC BLOOD PRESSURE: 156 MMHG | HEART RATE: 71 BPM

## 2025-02-17 DIAGNOSIS — E11.9 TYPE 2 DIABETES MELLITUS WITHOUT COMPLICATION, WITHOUT LONG-TERM CURRENT USE OF INSULIN: Primary | ICD-10-CM

## 2025-02-17 DIAGNOSIS — J44.9 STAGE 2 MODERATE COPD BY GOLD CLASSIFICATION: Primary | ICD-10-CM

## 2025-02-17 RX ORDER — METFORMIN HYDROCHLORIDE 500 MG/1
500 TABLET, EXTENDED RELEASE ORAL
Qty: 90 TABLET | Refills: 3 | Status: SHIPPED | OUTPATIENT
Start: 2025-02-17

## 2025-02-17 RX ORDER — PREDNISONE 10 MG/1
TABLET ORAL
Qty: 30 TABLET | Refills: 0 | Status: SHIPPED | OUTPATIENT
Start: 2025-02-17

## 2025-02-17 NOTE — TELEPHONE ENCOUNTER
Pt is wanting to know if she needs to be checking her blood sugar on a daily basis and if so, how many times a day?  She read there was a recall on the Metformin and should she be concerned? What kind of diet should she be on? Are there any other drug warnings as far as taking OTC medications?  Her pulmonologist recently put her back on steroids,  will there be are reaction with those?  Please advise.

## 2025-02-17 NOTE — TELEPHONE ENCOUNTER
Caller: Agustina Reyes    Relationship: Self    Best call back number: [phone]     What was the call regarding: PATIENT WOULD LIKE A CALL BACK ABOUT HER DIAGNOSIS OF DIABETES OF FURTHER DISCUSSION.     Is it okay if the provider responds through MyChart: NO

## 2025-02-17 NOTE — PROGRESS NOTES
"Pulmonary Office Follow Up      Subjective   Chief Complaint: Shortness of Breath    Agustina Reyes is a 69 y.o. female is being seen in follow up for COPD    History of Present Illness    Ms. Reyes is a 70yo F who is followed for COPD and an abnormal CT scan of the chest. She was last seen in clinic on 10/14/24.    She underwent a bronchoscopy with BAL on 8/19/24 which grew Mycobacterium intracellulare. She was referred to ID.     She returns to clinic today for follow up. Since her last visit, she was treated for an exacerbation in December with antibiotics and steroids.     She reports more productive sputum today.      The following portions of the patient's history were reviewed and updated as appropriate: allergies, current medications, past family history, past medical history, past social history, past surgical history and problem list.    Review of Systems   Constitutional: Negative.    Eyes: Negative.    Respiratory:  Positive for cough.    Cardiovascular: Negative.    Gastrointestinal: Negative.    Endocrine: Negative.    Genitourinary: Negative.    Musculoskeletal: Negative.    Skin: Negative.    Allergic/Immunologic: Negative.    Neurological: Negative.    Hematological: Negative.    Psychiatric/Behavioral: Negative.            Objective   Blood pressure 156/78, pulse 71, temperature 96.8 °F (36 °C), temperature source Infrared, height 167.6 cm (65.98\"), weight 87.5 kg (193 lb), SpO2 96%, not currently breastfeeding.  Physical Exam  Vitals and nursing note reviewed.   Constitutional:       General: She is not in acute distress.     Appearance: She is well-developed.   HENT:      Head: Normocephalic and atraumatic.   Eyes:      General: No scleral icterus.     Conjunctiva/sclera: Conjunctivae normal.      Pupils: Pupils are equal, round, and reactive to light.   Neck:      Thyroid: No thyromegaly.      Trachea: No tracheal deviation.   Cardiovascular:      Rate and Rhythm: Normal rate and regular " rhythm.      Heart sounds: Normal heart sounds.   Pulmonary:      Effort: Pulmonary effort is normal. No respiratory distress.      Breath sounds: Normal breath sounds.   Abdominal:      General: Bowel sounds are normal.      Palpations: Abdomen is soft.      Tenderness: There is no abdominal tenderness.   Musculoskeletal:         General: Normal range of motion.      Cervical back: Normal range of motion and neck supple.   Lymphadenopathy:      Cervical: No cervical adenopathy.   Skin:     General: Skin is warm and dry.      Findings: No erythema or rash.   Neurological:      Mental Status: She is alert and oriented to person, place, and time.      Motor: No abnormal muscle tone.      Coordination: Coordination normal.   Psychiatric:         Speech: Speech normal.         Behavior: Behavior normal.         Judgment: Judgment normal.         PFTs:  Performed today and personally reviewed.   There is no airway obstruction.  The lung volumes are normal.  The DLCO is normal.     Imaging:  CT Chest from 12/6/24 reviewed. There are findings of MAC colonization with scattered areas of clustered pulmonary nodules and tree-in-bud opacities, waxing and waning over multiple prior exams in a shifting patter, with extent of disease considered similar to the prior exam.     Assessment & Plan   Diagnoses and all orders for this visit:    1. Stage 2 moderate COPD by GOLD classification (Primary)  -     Spirometry with Diffusion Capacity & Lung Volumes    Other orders  -     amoxicillin-clavulanate (AUGMENTIN) 875-125 MG per tablet; Take 1 tablet by mouth 2 (Two) Times a Day.  Dispense: 14 tablet; Refill: 0  -     predniSONE (DELTASONE) 10 MG tablet; Take 4 tabs daily x 3 days, then take 3 tabs daily x 3 days, then take 2 tabs daily x 3 days, then take 1 tab daily x 3 days  Dispense: 30 tablet; Refill: 0      Discussion:  Ms. Reyes is a 68yo F who is followed for COPD and MAC.      1. Moderate COPD  - 1 exacerbation since her last  visit.   - PFTs are improved today as compared to 2022.   - Continue Breztri. Encouraged to try and use twice daily.   - Continue Albuterol as needed.   - Treat for an exacerbation today with a course of Augmentin and Prednisone.      2. Abnormal CT Chest/Mycobacterium  - Tree-in-bud nodularity first noticed in May 2021.   - Bronchoscopy on 8/19/24 grew mycobacterium intracelllulare and she was referred to ID.   - She has been unable to be started on treatment secondary to drug interactions. She will follow back up in March.   - Repeat CT Chest in December 2024 with stable disease.   - I will wait and see if she is able to be started on therapy prior to deciding on the timing for another CT scan of the chest. If she gets started on therapy, I would like to wait 6 months after starting meds prior to repeating the CT scan. If she is not going to be started on therapy, we should repeat the CT chest in June.      3. Allergic Rhinitis/Sinusitis  - Continue OTC antihistamine   - Continue Mucinex.   - Nasal sinus rinses instead.      4. GERD  - Elevate the head of the bed.   - Do not eat within 2-3 hours of bedtime when possible.     Follow up in 4-6 months.        Agustina Reyes  reports that she quit smoking about 10 years ago. Her smoking use included cigarettes. She started smoking about 50 years ago. She has a 40 pack-year smoking history. She has been exposed to tobacco smoke. She has never used smokeless tobacco.        Aria MARRERO Case, DO  Pulmonary and Critical Care Medicine  Note Electronically Signed

## 2025-02-18 NOTE — TELEPHONE ENCOUNTER
I am referring her to the dieticians for diabetes education.  Only one generic brand of metformin was recalled.  She should take it as prescribed.  She should take steroids for her lungs as needed, but she should make sure her pulmonologist knows she has diabetes.

## 2025-03-04 ENCOUNTER — HOSPITAL ENCOUNTER (OUTPATIENT)
Dept: DIABETES SERVICES | Facility: HOSPITAL | Age: 70
Setting detail: RECURRING SERIES
Discharge: HOME | End: 2025-03-04
Payer: COMMERCIAL

## 2025-03-04 PROCEDURE — G0108 DIAB MANAGE TRN  PER INDIV: HCPCS

## 2025-03-04 NOTE — CONSULTS
Diabetes Education    Patient Name:  Agustina Reyes  YOB: 1955  MRN: [id number]  Admit Date:  3/4/2025      Patient attended the scheduled 2 hour diabetes education class. Please see media tab for assessment and notes if you use EPIC. If you are not an EPIC user a copy of patient's assessment and notes will be sent per routine. Thank you.       Electronically signed by:  Kiesha Corcoran RN, [facility]  03/04/25 12:45 EST

## 2025-03-06 ENCOUNTER — TELEPHONE (OUTPATIENT)
Dept: PULMONOLOGY | Facility: CLINIC | Age: 70
End: 2025-03-06
Payer: COMMERCIAL

## 2025-03-06 RX ORDER — DOXYCYCLINE 100 MG/1
100 CAPSULE ORAL 2 TIMES DAILY
Qty: 20 CAPSULE | Refills: 0 | Status: SHIPPED | OUTPATIENT
Start: 2025-03-06

## 2025-03-06 RX ORDER — PREDNISONE 10 MG/1
TABLET ORAL
Qty: 31 TABLET | Refills: 0 | Status: SHIPPED | OUTPATIENT
Start: 2025-03-06

## 2025-03-06 NOTE — TELEPHONE ENCOUNTER
Patient called and states she is wheezing and coughing after getting a cold after being on steroids/abx about a month ago. Feels as if she is not improving. Please advise.

## 2025-03-10 RX ORDER — ALBUTEROL SULFATE 90 UG/1
2 INHALANT RESPIRATORY (INHALATION) EVERY 4 HOURS PRN
Qty: 8.5 G | Refills: 2 | Status: SHIPPED | OUTPATIENT
Start: 2025-03-10

## 2025-03-17 ENCOUNTER — TELEPHONE (OUTPATIENT)
Dept: PULMONOLOGY | Facility: CLINIC | Age: 70
End: 2025-03-17
Payer: COMMERCIAL

## 2025-03-17 NOTE — TELEPHONE ENCOUNTER
Patient called stating she feels as if the steroids and abx are not working. She is coughing but cannot cough up any sputum, what little she does cough up is a brownish color. Please advise.

## 2025-03-19 ENCOUNTER — OFFICE VISIT (OUTPATIENT)
Dept: PULMONOLOGY | Facility: CLINIC | Age: 70
End: 2025-03-19
Payer: COMMERCIAL

## 2025-03-19 VITALS
BODY MASS INDEX: 30.7 KG/M2 | OXYGEN SATURATION: 94 % | HEIGHT: 66 IN | WEIGHT: 191 LBS | SYSTOLIC BLOOD PRESSURE: 128 MMHG | DIASTOLIC BLOOD PRESSURE: 60 MMHG | TEMPERATURE: 97.8 F | HEART RATE: 72 BPM

## 2025-03-19 DIAGNOSIS — A31.0 MYCOBACTERIUM INTRACELLULARE INFECTION: ICD-10-CM

## 2025-03-19 DIAGNOSIS — R05.9 COUGH, UNSPECIFIED TYPE: ICD-10-CM

## 2025-03-19 DIAGNOSIS — J44.9 STAGE 2 MODERATE COPD BY GOLD CLASSIFICATION: Primary | ICD-10-CM

## 2025-03-19 RX ORDER — IPRATROPIUM BROMIDE AND ALBUTEROL SULFATE 2.5; .5 MG/3ML; MG/3ML
3 SOLUTION RESPIRATORY (INHALATION) 4 TIMES DAILY PRN
Qty: 120 ML | Refills: 5 | Status: SHIPPED | OUTPATIENT
Start: 2025-03-19

## 2025-03-19 NOTE — PROGRESS NOTES
"[facility] Pulmonary Follow up      Chief Complaint  Cough    Subjective          Agustina Reyes presents to [facility] for worsening wheezing and coughing.  On March 16 called in for a round of antibiotics, doxycycline and steroids for worsening cough and wheezing which was sent in.  On March 17 she called and was still having quite a bit of cough but no sputum production.  She is here today for follow-up with a chest x-ray.    She was last seen here in the office by Dr. Irwin on February 17 for follow-up on her COPD.  She was on a round of Augmentin in February.      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    She did have COVID-19 in December with quite a bit of cough and congestion.  She had another cold in February and had a round of Augmentin.  Since then I called in a round of doxycycline and prednisone.    Unfortunately she still has quite a bit of cough and congestion.  Her cough is keeping her up at night.  She has used her Tussidex and sometimes it does help.        Objective     Vital Signs:   /60   Pulse 72   Temp 97.8 °F (36.6 °C)   Ht 167.6 cm (65.98\")   Wt 86.6 kg (191 lb)   SpO2 94% Comment: room air at rest  BMI 30.84 kg/m²       Immunization History   Administered Date(s) Administered    COVID-19 (PFIZER) 12YRS+ (COMIRNATY) 10/12/2023    COVID-19 (PFIZER) BIVALENT 12+YRS 10/24/2022    COVID-19 (PFIZER) Purple Cap Monovalent 02/15/2021, 03/09/2021, 03/27/2021, 10/06/2021, 11/22/2021    Fluzone  >6mos 03/10/2019, 08/28/2020    Fluzone High-Dose 65+YRS 10/14/2024    Fluzone High-Dose 65+yrs 10/06/2021, 10/24/2022, 10/12/2023    Fluzone Quad >6mos (Multi-dose) 03/10/2019, 11/22/2021    Pneumococcal, Unspecified 05/05/2021    Shingrix 12/15/2019, 02/09/2020    Tdap 08/17/2023       Physical Exam  Vitals reviewed.   Constitutional:       General: She is not " in acute distress.     Appearance: She is well-developed.   HENT:      Head: Normocephalic and atraumatic.   Eyes:      Pupils: Pupils are equal, round, and reactive to light.   Cardiovascular:      Rate and Rhythm: Normal rate and regular rhythm.      Heart sounds: No murmur heard.  Pulmonary:      Effort: Pulmonary effort is normal. No respiratory distress.      Breath sounds: Normal breath sounds. Rhonchi present. No wheezing or rales.   Abdominal:      General: Bowel sounds are normal. There is no distension.      Palpations: Abdomen is soft.   Musculoskeletal:         General: Normal range of motion.      Cervical back: Normal range of motion and neck supple.   Skin:     General: Skin is warm and dry.      Findings: No erythema.   Neurological:      Mental Status: She is alert and oriented to person, place, and time.   Psychiatric:         Behavior: Behavior normal.          Result Review :            PA and lateral chest x-ray done the office today reviewed by me  Awaiting final MD interpretation                 Assessment and Plan    Problem List Items Addressed This Visit          Pulmonary and Pneumonias    Stage 2 moderate COPD by GOLD classification - Primary    Relevant Medications    ipratropium-albuterol (DUO-NEB) 0.5-2.5 mg/3 ml nebulizer    Other Relevant Orders    XR Chest PA & Lateral    Home Nebulizer       Other    Mycobacterium intracellulare infection     Other Visit Diagnoses         Cough, unspecified type        Relevant Orders    Covid-19 + Flu A&B AG, Veritor (Completed)    CT Chest Without Contrast    Respiratory Culture - Sputum, Cough    AFB Culture - Sputum, Cough          Mrs. Reyes has had a persistent cough since about December, it is waxed and waned some and she has been on several rounds of antibiotics and steroids.    We followed up on her chest x-ray today which does show some worsening right sided nodular infiltrates.  With her history of Mycobacterium that is currently  untreated I like to follow-up on a CT scan of the chest.    I also like to get a sputum Gram stain and culture as well as repeat AFB.    We did discuss adding on some nebulizers and a flutter valve.  She does not have a nebulizer machine at home.  I did give her the prescription for the machine and the nebulizers today in the office, she will hold onto them for now and see if she like to get that filled in the near future.          Follow Up     Return in about 2 weeks (around 4/2/2025).  Patient was given instructions and counseling regarding her condition or for health maintenance advice. Please see specific information pulled into the AVS if appropriate.         Excluding time spent on other separate services such as performing procedures or test interpretation, if applicable    Moderate level of Medical Decision Making complexity based on 2 or more chronic stable illnesses and prescription drug management.    JAYNE Mendez, ACNP  [facility]  Electronically signed

## 2025-03-20 ENCOUNTER — CLINICAL SUPPORT (OUTPATIENT)
Dept: PULMONOLOGY | Facility: CLINIC | Age: 70
End: 2025-03-20
Payer: COMMERCIAL

## 2025-03-20 DIAGNOSIS — R05.9 COUGH, UNSPECIFIED TYPE: ICD-10-CM

## 2025-03-20 PROBLEM — A31.0: Status: ACTIVE | Noted: 2025-03-20

## 2025-03-20 PROCEDURE — 87206 SMEAR FLUORESCENT/ACID STAI: CPT | Performed by: NURSE PRACTITIONER

## 2025-03-20 PROCEDURE — 87205 SMEAR GRAM STAIN: CPT | Performed by: NURSE PRACTITIONER

## 2025-03-20 PROCEDURE — 87116 MYCOBACTERIA CULTURE: CPT | Performed by: NURSE PRACTITIONER

## 2025-03-20 PROCEDURE — 87070 CULTURE OTHR SPECIMN AEROBIC: CPT | Performed by: NURSE PRACTITIONER

## 2025-03-20 PROCEDURE — 89220 SPUTUM SPECIMEN COLLECTION: CPT | Performed by: NURSE PRACTITIONER

## 2025-03-22 LAB
BACTERIA SPEC RESP CULT: NORMAL
GRAM STN SPEC: NORMAL

## 2025-03-24 ENCOUNTER — TELEPHONE (OUTPATIENT)
Dept: PULMONOLOGY | Facility: CLINIC | Age: 70
End: 2025-03-24
Payer: COMMERCIAL

## 2025-03-24 NOTE — TELEPHONE ENCOUNTER
Pt called today requesting results of most recent Chest Xray and sputum results. Pt notified that sputum results are normal and Chest Xray  shows no evidence of Pleural Effusion or Pneumothorax. Pt has an upcoming apt scheduled for 3/31/25 but not willing to wait and discuss further. Pt is aware that neither Dr Irwin or Eusebia are not available and will have Eusebia return her call once she is back in the office to discuss further. Pt verbalized understanding.

## 2025-03-25 ENCOUNTER — HOSPITAL ENCOUNTER (OUTPATIENT)
Facility: HOSPITAL | Age: 70
Discharge: HOME | End: 2025-03-25
Admitting: INTERNAL MEDICINE
Payer: COMMERCIAL

## 2025-03-25 DIAGNOSIS — I82.511 CHRONIC DEEP VEIN THROMBOSIS (DVT) OF FEMORAL VEIN OF RIGHT LOWER EXTREMITY: ICD-10-CM

## 2025-03-25 LAB
BH CV LOWER VASCULAR RIGHT COMMON FEMORAL AUGMENT: NORMAL
BH CV LOWER VASCULAR RIGHT COMMON FEMORAL COMPRESS: NORMAL
BH CV LOWER VASCULAR RIGHT COMMON FEMORAL PHASIC: NORMAL
BH CV LOWER VASCULAR RIGHT COMMON FEMORAL SPONT: NORMAL
BH CV LOWER VASCULAR RIGHT DISTAL FEMORAL COMPRESS: NORMAL
BH CV LOWER VASCULAR RIGHT GASTRONEMIUS COMPRESS: NORMAL
BH CV LOWER VASCULAR RIGHT GREATER SAPH AK COMPRESS: NORMAL
BH CV LOWER VASCULAR RIGHT GREATER SAPH BK COMPRESS: NORMAL
BH CV LOWER VASCULAR RIGHT LESSER SAPH COMPRESS: NORMAL
BH CV LOWER VASCULAR RIGHT MID FEMORAL AUGMENT: NORMAL
BH CV LOWER VASCULAR RIGHT MID FEMORAL COMPRESS: NORMAL
BH CV LOWER VASCULAR RIGHT MID FEMORAL PHASIC: NORMAL
BH CV LOWER VASCULAR RIGHT MID FEMORAL SPONT: NORMAL
BH CV LOWER VASCULAR RIGHT PERONEAL COMPRESS: NORMAL
BH CV LOWER VASCULAR RIGHT POPLITEAL AUGMENT: NORMAL
BH CV LOWER VASCULAR RIGHT POPLITEAL COMPRESS: NORMAL
BH CV LOWER VASCULAR RIGHT POPLITEAL PHASIC: NORMAL
BH CV LOWER VASCULAR RIGHT POPLITEAL SPONT: NORMAL
BH CV LOWER VASCULAR RIGHT POSTERIOR TIBIAL COMPRESS: NORMAL
BH CV LOWER VASCULAR RIGHT PROFUNDA FEMORAL COMPRESS: NORMAL
BH CV LOWER VASCULAR RIGHT PROXIMAL FEMORAL COMPRESS: NORMAL
BH CV LOWER VASCULAR RIGHT SAPHENOFEMORAL JUNCTION COMPRESS: NORMAL

## 2025-03-25 PROCEDURE — 93971 EXTREMITY STUDY: CPT | Performed by: INTERNAL MEDICINE

## 2025-03-25 PROCEDURE — 93971 EXTREMITY STUDY: CPT

## 2025-03-25 NOTE — TELEPHONE ENCOUNTER
I returned Mrs. Reyes's call.  So far her sputum cultures have shown no growth, but we did discuss the Mycobacterium may take 4 to 6 weeks to grow, will continue to watch those closely.    Also reviewed her chest x-ray she had done here at the office, we had discussed that she had a new right sided infiltrate, and I recommended a follow-up on a CT scan.  No one has called to get that scheduled yet, will see if and get that done in the near future.

## 2025-03-26 DIAGNOSIS — J44.9 STAGE 2 MODERATE COPD BY GOLD CLASSIFICATION: ICD-10-CM

## 2025-03-26 RX ORDER — BUDESONIDE, GLYCOPYRROLATE, AND FORMOTEROL FUMARATE 160; 9; 4.8 UG/1; UG/1; UG/1
2 AEROSOL, METERED RESPIRATORY (INHALATION) 2 TIMES DAILY
Qty: 3 EACH | Refills: 3 | Status: SHIPPED | OUTPATIENT
Start: 2025-03-26

## 2025-03-27 LAB
MYCOBACTERIUM SPEC CULT: NORMAL
NIGHT BLUE STAIN TISS: NORMAL

## 2025-03-29 ENCOUNTER — HOSPITAL ENCOUNTER (OUTPATIENT)
Facility: HOSPITAL | Age: 70
Discharge: HOME | End: 2025-03-29
Payer: COMMERCIAL

## 2025-03-29 DIAGNOSIS — R05.9 COUGH, UNSPECIFIED TYPE: ICD-10-CM

## 2025-03-29 PROCEDURE — 71250 CT THORAX DX C-: CPT

## 2025-03-31 ENCOUNTER — TELEPHONE (OUTPATIENT)
Dept: INTERNAL MEDICINE | Facility: CLINIC | Age: 70
End: 2025-03-31

## 2025-03-31 ENCOUNTER — OFFICE VISIT (OUTPATIENT)
Dept: PULMONOLOGY | Facility: CLINIC | Age: 70
End: 2025-03-31
Payer: COMMERCIAL

## 2025-03-31 VITALS
HEART RATE: 72 BPM | OXYGEN SATURATION: 92 % | HEIGHT: 66 IN | SYSTOLIC BLOOD PRESSURE: 128 MMHG | BODY MASS INDEX: 30.7 KG/M2 | DIASTOLIC BLOOD PRESSURE: 60 MMHG | WEIGHT: 191 LBS | TEMPERATURE: 98 F

## 2025-03-31 DIAGNOSIS — R93.89 ABNORMAL CT OF THE CHEST: ICD-10-CM

## 2025-03-31 DIAGNOSIS — J44.1 COPD WITH EXACERBATION: Primary | ICD-10-CM

## 2025-03-31 DIAGNOSIS — A31.0 MYCOBACTERIUM INTRACELLULARE INFECTION: ICD-10-CM

## 2025-03-31 PROCEDURE — 99214 OFFICE O/P EST MOD 30 MIN: CPT | Performed by: NURSE PRACTITIONER

## 2025-03-31 NOTE — TELEPHONE ENCOUNTER
Hub staff attempted to follow warm transfer process and was unsuccessful     Caller: Agustina Reyes    Relationship to patient: Self    Best call back number:   Telephone Information:   Mobile [phone]       Patient is needing: PATIENT IS REQUESTING A CALL BACK TO GO OVER RESULTS OF HER 3/25/25 ULTRASOUND. PLEASE CALL BACK TO DISCUSS

## 2025-03-31 NOTE — PROGRESS NOTES
"[facility] Pulmonary Follow up      Chief Complaint  Post CT    Subjective          Agustina Reyes presents to [facility]MON[facility] MEDICINE for follow-up on her CT scan of the chest.  She has been having a worsening cough and congestion for the last several weeks.  She has been on couple rounds of antibiotics and steroids.  She routinely follows here in the office with Dr. 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    Since she was having worsening symptoms we did follow-up on a CT scan of the chest.  She is here today to follow-up on her scan.  She also has a sputum culture and AFB culture pending, currently her AFP has showed no growth at week 1.          Objective     Vital Signs:   /60   Pulse 72   Temp 98 °F (36.7 °C)   Ht 167.6 cm (65.98\")   Wt 86.6 kg (191 lb)   SpO2 92% Comment: room air at rest  BMI 30.84 kg/m²       Immunization History   Administered Date(s) Administered    COVID-19 (PFIZER) 12YRS+ (COMIRNATY) 10/12/2023    COVID-19 (PFIZER) BIVALENT 12+YRS 10/24/2022    COVID-19 (PFIZER) Purple Cap Monovalent 02/15/2021, 03/09/2021, 03/27/2021, 10/06/2021, 11/22/2021    Fluzone  >6mos 03/10/2019, 08/28/2020    Fluzone High-Dose 65+YRS 10/14/2024    Fluzone High-Dose 65+yrs 10/06/2021, 10/24/2022, 10/12/2023    Fluzone Quad >6mos (Multi-dose) 03/10/2019, 11/22/2021    Pneumococcal, Unspecified 05/05/2021    Shingrix 12/15/2019, 02/09/2020    Tdap 08/17/2023       Physical Exam  Vitals reviewed.   Constitutional:       General: She is not in acute distress.     Appearance: She is well-developed.   HENT:      Head: Normocephalic and atraumatic.   Eyes:      Pupils: Pupils are equal, round, and reactive to light.   Cardiovascular:      Rate and Rhythm: Normal rate and regular rhythm.      Heart sounds: No murmur heard.  Pulmonary:      Effort: Pulmonary effort is normal. No " respiratory distress.      Breath sounds: Rhonchi present. No wheezing or rales.   Abdominal:      General: Bowel sounds are normal. There is no distension.      Palpations: Abdomen is soft.   Musculoskeletal:         General: Normal range of motion.      Cervical back: Normal range of motion and neck supple.   Skin:     General: Skin is warm and dry.      Findings: No erythema.   Neurological:      Mental Status: She is alert and oriented to person, place, and time.   Psychiatric:         Behavior: Behavior normal.          Result Review :       Data reviewed : Radiologic studies CT of the chest done march 29th      Yes we reviewed her CT scan today in the office, it has not been officially read by radiology but the micronodular and tree-in-bud opacities seem to have increased.                 Assessment and Plan    Problem List Items Addressed This Visit          Pulmonary and Pneumonias    COPD with exacerbation - Primary    Relevant Orders    OSCILLATING POSITIVE EXIRATORY PRESSURE (OPEP)       Symptoms and Signs    Abnormal CT of the chest       Other    Mycobacterium intracellulare infection       Based on her CT scan as well as her continued symptoms she does need to follow back up with infectious disease.  She had a follow-up appointment but canceled it since she was not been able to start on therapy due to her Eliquis.  We did discuss the importance of following up with Dr. Hidalgo as well as Dr. Zaragoza in regards to her Eliquis and treatment for her Mycobacterium, if indeed this is what is causing her worsening symptoms.    We also discussed the importance of airway clearance.  She has not been able to get her nebulizer machine yet on her medications.  Will send that over to another DME if patient is having trouble getting that to her.  As well as a flutter valve to use 1-2 times a day to help clear her secretions.    She will keep her follow-up appointment with Dr. Irwin in May.    Follow Up      Return for Next scheduled follow up.  Patient was given instructions and counseling regarding her condition or for health maintenance advice. Please see specific information pulled into the AVS if appropriate.     I spent 36 minutes caring for Agustina on this date of service. This time includes time spent by me in the following activities:preparing for the visit, reviewing tests, obtaining and/or reviewing a separately obtained history, performing a medically appropriate examination and/or evaluation , counseling and educating the patient/family/caregiver, ordering medications, tests, or procedures, referring and communicating with other health care professionals , documenting information in the medical record, and independently interpreting results and communicating that information with the patient/family/caregiver    Excluding time spent on other separate services such as performing procedures or test interpretation, if applicable        JAYNE Mendez, ACNP  [facility] Critical [facility] Medicine  Electronically signed

## 2025-04-01 LAB
MYCOBACTERIUM SPEC CULT: ABNORMAL
NIGHT BLUE STAIN TISS: ABNORMAL

## 2025-04-01 NOTE — TELEPHONE ENCOUNTER
I'm not sure there is an alternative, but if she would like a second opinion I can refer her to a vascular specialist.

## 2025-04-01 NOTE — TELEPHONE ENCOUNTER
Called and spoke with patient. She stated this has been an ongoing issue for almost a year now (May2024 and is frustrated that her results are the same with no improvement. Is she to be on eliquis forever? She asked if there is another procedure or medication to get rid of the clot?

## 2025-04-04 DIAGNOSIS — I10 ESSENTIAL HYPERTENSION: ICD-10-CM

## 2025-04-04 DIAGNOSIS — E78.2 MIXED HYPERLIPIDEMIA: ICD-10-CM

## 2025-04-04 RX ORDER — CHLORTHALIDONE 25 MG/1
25 TABLET ORAL DAILY
Qty: 90 TABLET | Refills: 3 | Status: SHIPPED | OUTPATIENT
Start: 2025-04-04

## 2025-04-11 ENCOUNTER — TELEPHONE (OUTPATIENT)
Dept: INTERNAL MEDICINE | Facility: CLINIC | Age: 70
End: 2025-04-11

## 2025-04-11 RX ORDER — PIOGLITAZONE 15 MG/1
15 TABLET ORAL DAILY
Qty: 30 TABLET | Refills: 3 | Status: SHIPPED | OUTPATIENT
Start: 2025-04-11

## 2025-04-11 NOTE — TELEPHONE ENCOUNTER
Caller: Agustina Reyes    Relationship: Self    Best call back number: [phone]     Which medication are you concerned about: METFORMIN    Who prescribed you this medication: ALEXANDER    When did you start taking this medication: FEB 2025    What are your concerns: PATIENT STATES SINCE BEGINNING THIS MEDICATION SHE IS CONSTANTLY NAUSEOUS. THIS HAS BEEN THE ONLY THING THAT CHANGE IN HER MEDICATIONS AND SHE BELIEVES THE METFORMIN IS CAUSING THIS. SHE WOULD LIKE TO SPEAK WITH DR. MUNOZ OR HIS NURSE ABOUT THIS MEDICATION AND THE POSSIBILITY OF HAVING A DIFFERENT MEDICATION CALLED IN.

## 2025-04-17 DIAGNOSIS — I82.511 CHRONIC DEEP VEIN THROMBOSIS (DVT) OF FEMORAL VEIN OF RIGHT LOWER EXTREMITY: ICD-10-CM

## 2025-04-17 DIAGNOSIS — I82.5Z9 CHRONIC DEEP VEIN THROMBOSIS (DVT) OF DISTAL VEIN OF LOWER EXTREMITY, UNSPECIFIED LATERALITY: Primary | ICD-10-CM

## 2025-04-17 LAB
MYCOBACTERIUM SPEC CULT: ABNORMAL
NIGHT BLUE STAIN TISS: ABNORMAL

## 2025-04-22 RX ORDER — APIXABAN 5 MG/1
5 TABLET, FILM COATED ORAL 2 TIMES DAILY
Qty: 60 TABLET | Refills: 2 | Status: SHIPPED | OUTPATIENT
Start: 2025-04-22

## 2025-04-29 LAB
MYCOBACTERIUM SPEC CULT: ABNORMAL
NIGHT BLUE STAIN TISS: ABNORMAL

## 2025-05-07 RX ORDER — ATORVASTATIN CALCIUM 40 MG/1
40 TABLET, FILM COATED ORAL DAILY
Qty: 90 TABLET | Refills: 3 | Status: SHIPPED | OUTPATIENT
Start: 2025-05-07

## 2025-05-07 NOTE — TELEPHONE ENCOUNTER
Lab Results   Component Value Date    CHOL 134 02/13/2025    CHLPL 115 02/26/2015    TRIG 77 02/13/2025    HDL 47 02/13/2025    LDL 72 02/13/2025

## 2025-05-13 NOTE — PROGRESS NOTES
"Pulmonary Office Follow Up      Subjective   Chief Complaint: Shortness of Breath    Agustina Reyes is a 69 y.o. adult is being seen in follow up for COPD    History of Present Illness    Ms. Reyes is a 68yo F who is followed for COPD and an abnormal CT scan of the chest. She was last seen in clinic on 10/14/24.    She underwent a bronchoscopy with BAL on 8/19/24 which grew Mycobacterium intracellulare. She was referred to ID.     She returns to clinic today for follow up. She has been started on Arikayce today and will take her first doses of Azithromycin and Ethambutol this evening. She will follow up with ID again later this month.     The following portions of the patient's history were reviewed and updated as appropriate: allergies, current medications, past family history, past medical history, past social history, past surgical history and problem list.    Review of Systems   Constitutional: Negative.    Eyes: Negative.    Respiratory:  Positive for cough.    Cardiovascular: Negative.    Gastrointestinal: Negative.    Endocrine: Negative.    Genitourinary: Negative.    Musculoskeletal: Negative.    Skin: Negative.    Allergic/Immunologic: Negative.    Neurological: Negative.    Hematological: Negative.    Psychiatric/Behavioral: Negative.            Objective   Blood pressure 122/64, pulse 82, temperature 97.8 °F (36.6 °C), height 167.6 cm (65.98\"), weight 86.6 kg (191 lb), SpO2 95%, not currently breastfeeding.  Physical Exam  Vitals and nursing note reviewed.   Constitutional:       General: She is not in acute distress.     Appearance: She is well-developed.   HENT:      Head: Normocephalic and atraumatic.   Eyes:      General: No scleral icterus.     Conjunctiva/sclera: Conjunctivae normal.      Pupils: Pupils are equal, round, and reactive to light.   Neck:      Thyroid: No thyromegaly.      Trachea: No tracheal deviation.   Cardiovascular:      Rate and Rhythm: Normal rate and regular rhythm.      " Heart sounds: Normal heart sounds.   Pulmonary:      Effort: Pulmonary effort is normal. No respiratory distress.      Breath sounds: Normal breath sounds.   Abdominal:      General: Bowel sounds are normal.      Palpations: Abdomen is soft.      Tenderness: There is no abdominal tenderness.   Musculoskeletal:         General: Normal range of motion.      Cervical back: Normal range of motion and neck supple.   Lymphadenopathy:      Cervical: No cervical adenopathy.   Skin:     General: Skin is warm and dry.      Findings: No erythema or rash.   Neurological:      Mental Status: She is alert and oriented to person, place, and time.      Motor: No abnormal muscle tone.      Coordination: Coordination normal.   Psychiatric:         Speech: Speech normal.         Behavior: Behavior normal.         Judgment: Judgment normal.         PFTs:  No new PFTs.    Imaging:  CT Chest from 3/29/25 reviewed. There has been significant worsening of diffuse nodular lung disease. There are now also areas of coalescent consolidation in the lower lobes and right middle lobe.     Assessment & Plan   Diagnoses and all orders for this visit:    1. Mycobacterium intracellulare infection (Primary)    2. Stage 2 moderate COPD by GOLD classification        Discussion:  Ms. Reyes is a 70yo F who is followed for COPD and MAC.      1. Moderate COPD  - Continue Breztri. Encouraged to try and use twice daily.   - Continue Albuterol as needed.      2. Abnormal CT Chest/Mycobacterium  - Tree-in-bud nodularity first noticed in May 2021.   - Bronchoscopy on 8/19/24 grew mycobacterium intracelllulare and she was referred to ID.   - She has been unable to be started on treatment secondary to drug interactions. She will follow back up in March.   - Repeat CT Chest in December 2024 with stable disease.   - CT chest from March 2025 with significant worsening.    - Started on Arikayce, Ethambutol and Azithromycin. She will follow up with ID later this month.       3. Allergic Rhinitis/Sinusitis  - Continue OTC antihistamine   - Continue Mucinex.   - Nasal sinus rinses instead.      4. GERD  - Elevate the head of the bed.   - Do not eat within 2-3 hours of bedtime when possible.     Follow up in 3-4 months.        Agustina Reyes  reports that she quit smoking about 10 years ago. Her smoking use included cigarettes. She started smoking about 50 years ago. She has a 40 pack-year smoking history. She has been exposed to tobacco smoke. She has never used smokeless tobacco.        Aria V Case, DO  Pulmonary and Critical Care Medicine  Note Electronically Signed

## 2025-05-14 ENCOUNTER — OFFICE VISIT (OUTPATIENT)
Dept: PULMONOLOGY | Facility: CLINIC | Age: 70
End: 2025-05-14
Payer: COMMERCIAL

## 2025-05-14 VITALS
WEIGHT: 191 LBS | OXYGEN SATURATION: 95 % | TEMPERATURE: 97.8 F | BODY MASS INDEX: 30.7 KG/M2 | HEART RATE: 82 BPM | HEIGHT: 66 IN | SYSTOLIC BLOOD PRESSURE: 122 MMHG | DIASTOLIC BLOOD PRESSURE: 64 MMHG

## 2025-05-14 DIAGNOSIS — A31.0 MYCOBACTERIUM INTRACELLULARE INFECTION: Primary | ICD-10-CM

## 2025-05-14 DIAGNOSIS — J44.9 STAGE 2 MODERATE COPD BY GOLD CLASSIFICATION: ICD-10-CM

## 2025-05-14 PROCEDURE — 99214 OFFICE O/P EST MOD 30 MIN: CPT | Performed by: INTERNAL MEDICINE

## 2025-05-14 RX ORDER — AZITHROMYCIN 500 MG/1
500 TABLET, FILM COATED ORAL DAILY
COMMUNITY

## 2025-05-14 RX ORDER — HYDROCODONE POLISTIREX AND CHLORPHENIRAMINE POLISTIREX 10; 8 MG/5ML; MG/5ML
SUSPENSION, EXTENDED RELEASE ORAL
COMMUNITY

## 2025-05-14 RX ORDER — AMIKACIN 590 MG/8.4ML
SUSPENSION RESPIRATORY (INHALATION)
COMMUNITY
Start: 2025-05-07

## 2025-05-14 RX ORDER — ETHAMBUTOL HYDROCHLORIDE 400 MG/1
1200 TABLET, FILM COATED ORAL DAILY
COMMUNITY

## 2025-06-04 LAB
MYCOBACTERIUM SPEC CULT: ABNORMAL
NIGHT BLUE STAIN TISS: ABNORMAL

## 2025-06-06 ENCOUNTER — APPOINTMENT (OUTPATIENT)
Facility: HOSPITAL | Age: 70
End: 2025-06-06
Payer: COMMERCIAL

## 2025-06-06 ENCOUNTER — HOSPITAL ENCOUNTER (OUTPATIENT)
Facility: HOSPITAL | Age: 70
Setting detail: OBSERVATION
Discharge: HOME | End: 2025-06-09
Attending: STUDENT IN AN ORGANIZED HEALTH CARE EDUCATION/TRAINING PROGRAM | Admitting: INTERNAL MEDICINE
Payer: COMMERCIAL

## 2025-06-06 DIAGNOSIS — J96.01 ACUTE HYPOXIC RESPIRATORY FAILURE: Primary | ICD-10-CM

## 2025-06-06 DIAGNOSIS — J44.1 COPD EXACERBATION: ICD-10-CM

## 2025-06-06 PROBLEM — I82.509 CHRONIC DEEP VEIN THROMBOSIS (DVT): Status: ACTIVE | Noted: 2025-06-06

## 2025-06-06 PROBLEM — R06.00 DYSPNEA: Status: ACTIVE | Noted: 2025-06-06

## 2025-06-06 PROBLEM — T50.905A MEDICATION REACTION: Status: ACTIVE | Noted: 2025-06-06

## 2025-06-06 LAB
ALBUMIN SERPL-MCNC: 4.4 G/DL (ref 3.5–5.2)
ALBUMIN/GLOB SERPL: 1.5 G/DL
ALP SERPL-CCNC: 82 U/L (ref 39–117)
ALT SERPL W P-5'-P-CCNC: 12 U/L (ref 1–33)
ANION GAP SERPL CALCULATED.3IONS-SCNC: 12.9 MMOL/L (ref 5–15)
ARTERIAL PATENCY WRIST A: ABNORMAL
AST SERPL-CCNC: 17 U/L (ref 1–32)
ATMOSPHERIC PRESS: ABNORMAL MM[HG]
B PARAPERT DNA SPEC QL NAA+PROBE: NOT DETECTED
B PERT DNA SPEC QL NAA+PROBE: NOT DETECTED
BASE EXCESS BLDA CALC-SCNC: -0.8 MMOL/L (ref 0–2)
BASOPHILS # BLD AUTO: 0.08 10*3/MM3 (ref 0–0.2)
BASOPHILS NFR BLD AUTO: 1 % (ref 0–1.5)
BDY SITE: ABNORMAL
BILIRUB SERPL-MCNC: 0.5 MG/DL (ref 0–1.2)
BODY TEMPERATURE: 37
BUN SERPL-MCNC: 18.6 MG/DL (ref 8–23)
BUN/CREAT SERPL: 16.6 (ref 7–25)
C PNEUM DNA NPH QL NAA+NON-PROBE: NOT DETECTED
CALCIUM SPEC-SCNC: 9.6 MG/DL (ref 8.6–10.5)
CHLORIDE SERPL-SCNC: 104 MMOL/L (ref 98–107)
CO2 BLDA-SCNC: 24.6 MMOL/L (ref 22–33)
CO2 SERPL-SCNC: 24.1 MMOL/L (ref 22–29)
COHGB MFR BLD: 0.9 % (ref 0–2)
CREAT SERPL-MCNC: 1.12 MG/DL (ref 0.57–1)
D-LACTATE SERPL-SCNC: 1.6 MMOL/L (ref 0.5–2)
DEPRECATED RDW RBC AUTO: 47.2 FL (ref 37–54)
EGFRCR SERPLBLD CKD-EPI 2021: 53.3 ML/MIN/1.73
EOSINOPHIL # BLD AUTO: 0.74 10*3/MM3 (ref 0–0.4)
EOSINOPHIL NFR BLD AUTO: 9.1 % (ref 0.3–6.2)
EPAP: 0
ERYTHROCYTE [DISTWIDTH] IN BLOOD BY AUTOMATED COUNT: 15.2 % (ref 12.3–15.4)
FLUAV SUBTYP SPEC NAA+PROBE: NOT DETECTED
FLUBV RNA ISLT QL NAA+PROBE: NOT DETECTED
GEN 5 1HR TROPONIN T REFLEX: 13 NG/L
GLOBULIN UR ELPH-MCNC: 2.9 GM/DL
GLUCOSE BLDC GLUCOMTR-MCNC: 142 MG/DL (ref 70–130)
GLUCOSE SERPL-MCNC: 102 MG/DL (ref 65–99)
HADV DNA SPEC NAA+PROBE: NOT DETECTED
HCO3 BLDA-SCNC: 23.5 MMOL/L (ref 20–26)
HCOV 229E RNA SPEC QL NAA+PROBE: NOT DETECTED
HCOV HKU1 RNA SPEC QL NAA+PROBE: NOT DETECTED
HCOV NL63 RNA SPEC QL NAA+PROBE: NOT DETECTED
HCOV OC43 RNA SPEC QL NAA+PROBE: NOT DETECTED
HCT VFR BLD AUTO: 35.4 % (ref 34–46.6)
HCT VFR BLD CALC: 33.5 %
HGB BLD-MCNC: 11.2 G/DL (ref 12–15.9)
HGB BLDA-MCNC: 10.9 G/DL (ref 14–18)
HMPV RNA NPH QL NAA+NON-PROBE: NOT DETECTED
HOLD SPECIMEN: NORMAL
HPIV1 RNA ISLT QL NAA+PROBE: NOT DETECTED
HPIV2 RNA SPEC QL NAA+PROBE: NOT DETECTED
HPIV3 RNA NPH QL NAA+PROBE: NOT DETECTED
HPIV4 P GENE NPH QL NAA+PROBE: NOT DETECTED
IMM GRANULOCYTES # BLD AUTO: 0.01 10*3/MM3 (ref 0–0.05)
IMM GRANULOCYTES NFR BLD AUTO: 0.1 % (ref 0–0.5)
INHALED O2 CONCENTRATION: 28 %
IPAP: 0
LYMPHOCYTES # BLD AUTO: 1.61 10*3/MM3 (ref 0.7–3.1)
LYMPHOCYTES NFR BLD AUTO: 19.8 % (ref 19.6–45.3)
M PNEUMO IGG SER IA-ACNC: NOT DETECTED
MCH RBC QN AUTO: 26.6 PG (ref 26.6–33)
MCHC RBC AUTO-ENTMCNC: 31.6 G/DL (ref 31.5–35.7)
MCV RBC AUTO: 84.1 FL (ref 79–97)
METHGB BLD QL: ABNORMAL
MODALITY: ABNORMAL
MONOCYTES # BLD AUTO: 0.57 10*3/MM3 (ref 0.1–0.9)
MONOCYTES NFR BLD AUTO: 7 % (ref 5–12)
NEUTROPHILS NFR BLD AUTO: 5.12 10*3/MM3 (ref 1.7–7)
NEUTROPHILS NFR BLD AUTO: 63 % (ref 42.7–76)
NT-PROBNP SERPL-MCNC: 132 PG/ML (ref 0–900)
OXYHGB MFR BLDV: 97 % (ref 94–99)
PAW @ PEAK INSP FLOW SETTING VENT: 0 CMH2O
PCO2 BLDA: 36.5 MM HG (ref 35–45)
PCO2 TEMP ADJ BLD: 36.5 MM HG (ref 35–45)
PH BLDA: 7.42 PH UNITS (ref 7.35–7.45)
PH, TEMP CORRECTED: 7.42 PH UNITS
PLATELET # BLD AUTO: 261 10*3/MM3 (ref 140–450)
PMV BLD AUTO: 10 FL (ref 6–12)
PO2 BLDA: 91.3 MM HG (ref 83–108)
PO2 TEMP ADJ BLD: 91.3 MM HG (ref 83–108)
POTASSIUM SERPL-SCNC: 3.9 MMOL/L (ref 3.5–5.2)
PROT SERPL-MCNC: 7.3 G/DL (ref 6–8.5)
RBC # BLD AUTO: 4.21 10*6/MM3 (ref 3.77–5.28)
RHINOVIRUS RNA SPEC NAA+PROBE: NOT DETECTED
RSV RNA NPH QL NAA+NON-PROBE: NOT DETECTED
SARS-COV-2 RNA RESP QL NAA+PROBE: NOT DETECTED
SODIUM SERPL-SCNC: 141 MMOL/L (ref 136–145)
TOTAL RATE: 0 BREATHS/MINUTE
TROPONIN T % DELTA: -19
TROPONIN T NUMERIC DELTA: -3 NG/L
TROPONIN T SERPL HS-MCNC: 16 NG/L
WBC NRBC COR # BLD AUTO: 8.13 10*3/MM3 (ref 3.4–10.8)
WHOLE BLOOD HOLD COAG: NORMAL
WHOLE BLOOD HOLD SPECIMEN: NORMAL

## 2025-06-06 PROCEDURE — 94799 UNLISTED PULMONARY SVC/PX: CPT

## 2025-06-06 PROCEDURE — G0378 HOSPITAL OBSERVATION PER HR: HCPCS

## 2025-06-06 PROCEDURE — 99285 EMERGENCY DEPT VISIT HI MDM: CPT

## 2025-06-06 PROCEDURE — 25010000002 AMPICILLIN-SULBACTAM PER 1.5 G: Performed by: NURSE PRACTITIONER

## 2025-06-06 PROCEDURE — 83050 HGB METHEMOGLOBIN QUAN: CPT

## 2025-06-06 PROCEDURE — 25510000001 IOPAMIDOL PER 1 ML: Performed by: STUDENT IN AN ORGANIZED HEALTH CARE EDUCATION/TRAINING PROGRAM

## 2025-06-06 PROCEDURE — 71045 X-RAY EXAM CHEST 1 VIEW: CPT

## 2025-06-06 PROCEDURE — 36600 WITHDRAWAL OF ARTERIAL BLOOD: CPT

## 2025-06-06 PROCEDURE — 99285 EMERGENCY DEPT VISIT HI MDM: CPT | Performed by: STUDENT IN AN ORGANIZED HEALTH CARE EDUCATION/TRAINING PROGRAM

## 2025-06-06 PROCEDURE — 82948 REAGENT STRIP/BLOOD GLUCOSE: CPT

## 2025-06-06 PROCEDURE — 96374 THER/PROPH/DIAG INJ IV PUSH: CPT

## 2025-06-06 PROCEDURE — 36415 COLL VENOUS BLD VENIPUNCTURE: CPT

## 2025-06-06 PROCEDURE — 82805 BLOOD GASES W/O2 SATURATION: CPT

## 2025-06-06 PROCEDURE — 80053 COMPREHEN METABOLIC PANEL: CPT | Performed by: STUDENT IN AN ORGANIZED HEALTH CARE EDUCATION/TRAINING PROGRAM

## 2025-06-06 PROCEDURE — 99223 1ST HOSP IP/OBS HIGH 75: CPT | Performed by: NURSE PRACTITIONER

## 2025-06-06 PROCEDURE — 71275 CT ANGIOGRAPHY CHEST: CPT

## 2025-06-06 PROCEDURE — 94664 DEMO&/EVAL PT USE INHALER: CPT

## 2025-06-06 PROCEDURE — 85025 COMPLETE CBC W/AUTO DIFF WBC: CPT | Performed by: STUDENT IN AN ORGANIZED HEALTH CARE EDUCATION/TRAINING PROGRAM

## 2025-06-06 PROCEDURE — 82375 ASSAY CARBOXYHB QUANT: CPT

## 2025-06-06 PROCEDURE — 63710000001 PREDNISONE PER 1 MG: Performed by: NURSE PRACTITIONER

## 2025-06-06 PROCEDURE — 83605 ASSAY OF LACTIC ACID: CPT | Performed by: STUDENT IN AN ORGANIZED HEALTH CARE EDUCATION/TRAINING PROGRAM

## 2025-06-06 PROCEDURE — 25010000002 METHYLPREDNISOLONE PER 125 MG: Performed by: STUDENT IN AN ORGANIZED HEALTH CARE EDUCATION/TRAINING PROGRAM

## 2025-06-06 PROCEDURE — 93005 ELECTROCARDIOGRAM TRACING: CPT | Performed by: STUDENT IN AN ORGANIZED HEALTH CARE EDUCATION/TRAINING PROGRAM

## 2025-06-06 PROCEDURE — 84484 ASSAY OF TROPONIN QUANT: CPT | Performed by: STUDENT IN AN ORGANIZED HEALTH CARE EDUCATION/TRAINING PROGRAM

## 2025-06-06 PROCEDURE — 83880 ASSAY OF NATRIURETIC PEPTIDE: CPT | Performed by: STUDENT IN AN ORGANIZED HEALTH CARE EDUCATION/TRAINING PROGRAM

## 2025-06-06 PROCEDURE — 0202U NFCT DS 22 TRGT SARS-COV-2: CPT | Performed by: STUDENT IN AN ORGANIZED HEALTH CARE EDUCATION/TRAINING PROGRAM

## 2025-06-06 PROCEDURE — 94640 AIRWAY INHALATION TREATMENT: CPT

## 2025-06-06 RX ORDER — IPRATROPIUM BROMIDE AND ALBUTEROL SULFATE 2.5; .5 MG/3ML; MG/3ML
3 SOLUTION RESPIRATORY (INHALATION) ONCE
Status: COMPLETED | OUTPATIENT
Start: 2025-06-06 | End: 2025-06-06

## 2025-06-06 RX ORDER — ASPIRIN 81 MG/1
81 TABLET ORAL DAILY
Status: DISCONTINUED | OUTPATIENT
Start: 2025-06-07 | End: 2025-06-09 | Stop reason: HOSPADM

## 2025-06-06 RX ORDER — ONDANSETRON 4 MG/1
4 TABLET, ORALLY DISINTEGRATING ORAL EVERY 6 HOURS PRN
Status: DISCONTINUED | OUTPATIENT
Start: 2025-06-06 | End: 2025-06-09 | Stop reason: HOSPADM

## 2025-06-06 RX ORDER — LOSARTAN POTASSIUM 50 MG/1
100 TABLET ORAL DAILY
Status: DISCONTINUED | OUTPATIENT
Start: 2025-06-07 | End: 2025-06-09 | Stop reason: HOSPADM

## 2025-06-06 RX ORDER — IOPAMIDOL 755 MG/ML
100 INJECTION, SOLUTION INTRAVASCULAR
Status: COMPLETED | OUTPATIENT
Start: 2025-06-06 | End: 2025-06-06

## 2025-06-06 RX ORDER — ONDANSETRON 2 MG/ML
4 INJECTION INTRAMUSCULAR; INTRAVENOUS EVERY 6 HOURS PRN
Status: DISCONTINUED | OUTPATIENT
Start: 2025-06-06 | End: 2025-06-09 | Stop reason: HOSPADM

## 2025-06-06 RX ORDER — IBUPROFEN 600 MG/1
1 TABLET ORAL
Status: DISCONTINUED | OUTPATIENT
Start: 2025-06-06 | End: 2025-06-09 | Stop reason: HOSPADM

## 2025-06-06 RX ORDER — CHLORTHALIDONE 25 MG/1
25 TABLET ORAL DAILY
Status: DISCONTINUED | OUTPATIENT
Start: 2025-06-07 | End: 2025-06-09 | Stop reason: HOSPADM

## 2025-06-06 RX ORDER — PREDNISONE 20 MG/1
40 TABLET ORAL
Status: DISCONTINUED | OUTPATIENT
Start: 2025-06-06 | End: 2025-06-09 | Stop reason: HOSPADM

## 2025-06-06 RX ORDER — BISACODYL 10 MG
10 SUPPOSITORY, RECTAL RECTAL DAILY PRN
Status: DISCONTINUED | OUTPATIENT
Start: 2025-06-06 | End: 2025-06-09 | Stop reason: HOSPADM

## 2025-06-06 RX ORDER — AMOXICILLIN 250 MG
2 CAPSULE ORAL 2 TIMES DAILY PRN
Status: DISCONTINUED | OUTPATIENT
Start: 2025-06-06 | End: 2025-06-09 | Stop reason: HOSPADM

## 2025-06-06 RX ORDER — POLYETHYLENE GLYCOL 3350 17 G/17G
17 POWDER, FOR SOLUTION ORAL DAILY PRN
Status: DISCONTINUED | OUTPATIENT
Start: 2025-06-06 | End: 2025-06-09 | Stop reason: HOSPADM

## 2025-06-06 RX ORDER — METHYLPREDNISOLONE SODIUM SUCCINATE 125 MG/2ML
125 INJECTION INTRAMUSCULAR; INTRAVENOUS ONCE
Status: COMPLETED | OUTPATIENT
Start: 2025-06-06 | End: 2025-06-06

## 2025-06-06 RX ORDER — HYDROCODONE POLISTIREX AND CHLORPHENIRAMINE POLISTIREX 10; 8 MG/5ML; MG/5ML
5 SUSPENSION, EXTENDED RELEASE ORAL EVERY 12 HOURS PRN
Refills: 0 | Status: DISCONTINUED | OUTPATIENT
Start: 2025-06-06 | End: 2025-06-09 | Stop reason: HOSPADM

## 2025-06-06 RX ORDER — SODIUM CHLORIDE 0.9 % (FLUSH) 0.9 %
10 SYRINGE (ML) INJECTION AS NEEDED
Status: DISCONTINUED | OUTPATIENT
Start: 2025-06-06 | End: 2025-06-09 | Stop reason: HOSPADM

## 2025-06-06 RX ORDER — NITROGLYCERIN 0.4 MG/1
0.4 TABLET SUBLINGUAL
Status: DISCONTINUED | OUTPATIENT
Start: 2025-06-06 | End: 2025-06-09 | Stop reason: HOSPADM

## 2025-06-06 RX ORDER — BISACODYL 5 MG/1
5 TABLET, DELAYED RELEASE ORAL DAILY PRN
Status: DISCONTINUED | OUTPATIENT
Start: 2025-06-06 | End: 2025-06-09 | Stop reason: HOSPADM

## 2025-06-06 RX ORDER — BUDESONIDE 0.5 MG/2ML
0.5 INHALANT ORAL
Status: DISCONTINUED | OUTPATIENT
Start: 2025-06-06 | End: 2025-06-09 | Stop reason: HOSPADM

## 2025-06-06 RX ORDER — WATER 10 ML/10ML
INJECTION INTRAMUSCULAR; INTRAVENOUS; SUBCUTANEOUS
Status: COMPLETED
Start: 2025-06-06 | End: 2025-06-06

## 2025-06-06 RX ORDER — GUAIFENESIN/DEXTROMETHORPHAN 100-10MG/5
10 SYRUP ORAL EVERY 6 HOURS PRN
Status: DISCONTINUED | OUTPATIENT
Start: 2025-06-06 | End: 2025-06-09 | Stop reason: HOSPADM

## 2025-06-06 RX ORDER — IPRATROPIUM BROMIDE AND ALBUTEROL SULFATE 2.5; .5 MG/3ML; MG/3ML
3 SOLUTION RESPIRATORY (INHALATION) 4 TIMES DAILY PRN
Status: DISCONTINUED | OUTPATIENT
Start: 2025-06-06 | End: 2025-06-09 | Stop reason: HOSPADM

## 2025-06-06 RX ORDER — ACETAMINOPHEN 325 MG/1
650 TABLET ORAL EVERY 4 HOURS PRN
Status: DISCONTINUED | OUTPATIENT
Start: 2025-06-06 | End: 2025-06-09 | Stop reason: HOSPADM

## 2025-06-06 RX ORDER — DEXTROSE MONOHYDRATE 25 G/50ML
25 INJECTION, SOLUTION INTRAVENOUS
Status: DISCONTINUED | OUTPATIENT
Start: 2025-06-06 | End: 2025-06-09 | Stop reason: HOSPADM

## 2025-06-06 RX ORDER — ATORVASTATIN CALCIUM 40 MG/1
40 TABLET, FILM COATED ORAL DAILY
Status: DISCONTINUED | OUTPATIENT
Start: 2025-06-07 | End: 2025-06-09 | Stop reason: HOSPADM

## 2025-06-06 RX ORDER — SODIUM CHLORIDE 0.9 % (FLUSH) 0.9 %
10 SYRINGE (ML) INJECTION EVERY 12 HOURS SCHEDULED
Status: DISCONTINUED | OUTPATIENT
Start: 2025-06-06 | End: 2025-06-09 | Stop reason: HOSPADM

## 2025-06-06 RX ORDER — SODIUM CHLORIDE 9 MG/ML
40 INJECTION, SOLUTION INTRAVENOUS AS NEEDED
Status: DISCONTINUED | OUTPATIENT
Start: 2025-06-06 | End: 2025-06-09 | Stop reason: HOSPADM

## 2025-06-06 RX ORDER — IPRATROPIUM BROMIDE AND ALBUTEROL SULFATE 2.5; .5 MG/3ML; MG/3ML
3 SOLUTION RESPIRATORY (INHALATION)
Status: DISCONTINUED | OUTPATIENT
Start: 2025-06-06 | End: 2025-06-09 | Stop reason: HOSPADM

## 2025-06-06 RX ORDER — NICOTINE POLACRILEX 4 MG
15 LOZENGE BUCCAL
Status: DISCONTINUED | OUTPATIENT
Start: 2025-06-06 | End: 2025-06-09 | Stop reason: HOSPADM

## 2025-06-06 RX ORDER — ACETAMINOPHEN 650 MG/1
650 SUPPOSITORY RECTAL EVERY 4 HOURS PRN
Status: DISCONTINUED | OUTPATIENT
Start: 2025-06-06 | End: 2025-06-09 | Stop reason: HOSPADM

## 2025-06-06 RX ORDER — INSULIN LISPRO 100 [IU]/ML
2-7 INJECTION, SOLUTION INTRAVENOUS; SUBCUTANEOUS
Status: DISCONTINUED | OUTPATIENT
Start: 2025-06-06 | End: 2025-06-09 | Stop reason: HOSPADM

## 2025-06-06 RX ORDER — ACETAMINOPHEN 160 MG/5ML
650 SOLUTION ORAL EVERY 4 HOURS PRN
Status: DISCONTINUED | OUTPATIENT
Start: 2025-06-06 | End: 2025-06-09 | Stop reason: HOSPADM

## 2025-06-06 RX ADMIN — IPRATROPIUM BROMIDE AND ALBUTEROL SULFATE 3 ML: 2.5; .5 SOLUTION RESPIRATORY (INHALATION) at 11:23

## 2025-06-06 RX ADMIN — IPRATROPIUM BROMIDE AND ALBUTEROL SULFATE 3 ML: 2.5; .5 SOLUTION RESPIRATORY (INHALATION) at 11:28

## 2025-06-06 RX ADMIN — IOPAMIDOL 85 ML: 755 INJECTION, SOLUTION INTRAVENOUS at 15:12

## 2025-06-06 RX ADMIN — METHYLPREDNISOLONE SODIUM SUCCINATE 125 MG: 125 INJECTION, POWDER, FOR SOLUTION INTRAMUSCULAR; INTRAVENOUS at 11:43

## 2025-06-06 RX ADMIN — AMPICILLIN SODIUM AND SULBACTAM SODIUM 3000 MG: 2; 1 INJECTION, POWDER, FOR SOLUTION INTRAMUSCULAR; INTRAVENOUS at 21:44

## 2025-06-06 RX ADMIN — PREDNISONE 40 MG: 20 TABLET ORAL at 21:45

## 2025-06-06 RX ADMIN — IPRATROPIUM BROMIDE AND ALBUTEROL SULFATE 3 ML: 2.5; .5 SOLUTION RESPIRATORY (INHALATION) at 21:32

## 2025-06-06 RX ADMIN — BUDESONIDE 0.5 MG: 0.5 SUSPENSION RESPIRATORY (INHALATION) at 21:32

## 2025-06-06 RX ADMIN — APIXABAN 5 MG: 5 TABLET, FILM COATED ORAL at 21:45

## 2025-06-06 RX ADMIN — WATER 10 ML: 1 INJECTION INTRAMUSCULAR; INTRAVENOUS; SUBCUTANEOUS at 11:43

## 2025-06-06 NOTE — PLAN OF CARE
Problem: Adult Inpatient Plan of Care  Goal: Plan of Care Review  Outcome: Progressing  Goal: Patient-Specific Goal (Individualized)  Outcome: Progressing  Goal: Absence of Hospital-Acquired Illness or Injury  Outcome: Progressing  Goal: Optimal Comfort and Wellbeing  Outcome: Progressing  Goal: Readiness for Transition of Care  Outcome: Progressing  Intervention: Mutually Develop Transition Plan  Recent Flowsheet Documentation  Taken 6/6/2025 1807 by Carrie Causey, RN  Equipment Currently Used at Home: none  Taken 6/6/2025 1806 by Carrie Causey, RN  Transportation Anticipated: family or friend will provide  Patient/Family Anticipated Services at Transition: none  Patient/Family Anticipates Transition to: home with family   Goal Outcome Evaluation:

## 2025-06-06 NOTE — ED NOTES
Agustina Reyes    Nursing Report ED to Floor:  Mental status: A&O  Ambulatory status: ad cecilia  Oxygen Therapy:  2L  Cardiac Rhythm: NSR  Admitted from: home  Safety Concerns:  none  Precautions: none  Social Issues: none  ED Room #:  17    ED Nurse Phone Extension - [phone] or may call [phone].      HPI:   Chief Complaint   Patient presents with    Shortness of Breath       Past Medical History:  Past Medical History:   Diagnosis Date    Cataract 2017    COPD (chronic obstructive pulmonary disease) May2020    COVID-19 virus infection 12/30/2024    Deep vein thrombosis April2024    GERD (gastroesophageal reflux disease)     Hyperlipidemia     Hypertension     Migraines     Occipital neuralgia of left side     Pneumonia     PONV (postoperative nausea and vomiting)     Shingles     Stroke 2014    no residual effects    Umbilical hernia         Past Surgical History:  Past Surgical History:   Procedure Laterality Date    BRONCHOSCOPY N/A 8/19/2024    Procedure: BRONCHOSCOPY WITH BRONCHOALVEOLAR LAVAGE;  Surgeon: Aria Irwin DO;  Location: [facility] ENDOSCOPY;  Service: Pulmonary;  Laterality: N/A;    CATARACT EXTRACTION Bilateral     COLONOSCOPY      FOOT SURGERY Left     x4    HYSTERECTOMY  2000    SUBTOTAL HYSTERECTOMY          Admitting Doctor:   No admitting provider for patient encounter.    Consulting Provider(s):  Consults       No orders found from 5/8/2025 to 6/7/2025.             Admitting Diagnosis:   The encounter diagnosis was Acute hypoxic respiratory failure.    Most Recent Vitals:   Vitals:    06/06/25 1248 06/06/25 1256 06/06/25 1400 06/06/25 1430   BP:   141/54 134/55   BP Location:       Patient Position:       Pulse: 79 78 80 74   Resp:       Temp:       TempSrc:       SpO2: (!) 88% 96% 96% 95%   Weight:       Height:           Active LDAs/IV Access:   Lines, Drains & Airways       Active LDAs       Name Placement date Placement time Site Days    Peripheral IV 06/06/25 1101 20 G Right  Antecubital 06/06/25  1101  Antecubital  less than 1                    Labs (abnormal labs have a star):   Labs Reviewed   COMPREHENSIVE METABOLIC PANEL - Abnormal; Notable for the following components:       Result Value    Glucose 102 (*)     Creatinine 1.12 (*)     eGFR 53.3 (*)     All other components within normal limits    Narrative:     GFR Categories in Chronic Kidney Disease (CKD)              GFR Category          GFR (mL/min/1.73)    Interpretation  G1                    90 or greater        Normal or high (1)  G2                    60-89                Mild decrease (1)  G3a                   45-59                Mild to moderate decrease  G3b                   30-44                Moderate to severe decrease  G4                    15-29                Severe decrease  G5                    14 or less           Kidney failure    (1)In the absence of evidence of kidney disease, neither GFR category G1 or G2 fulfill the criteria for CKD.    eGFR calculation 2021 CKD-EPI creatinine equation, which does not include race as a factor   TROPONIN - Abnormal; Notable for the following components:    HS Troponin T 16 (*)     All other components within normal limits   CBC WITH AUTO DIFFERENTIAL - Abnormal; Notable for the following components:    Hemoglobin 11.2 (*)     Eosinophil % 9.1 (*)     Eosinophils, Absolute 0.74 (*)     All other components within normal limits   BLOOD GAS, ARTERIAL W/CO-OXIMETRY - Abnormal; Notable for the following components:    Base Excess, Arterial -0.8 (*)     Hemoglobin, Blood Gas 10.9 (*)     All other components within normal limits   RESPIRATORY PANEL PCR W/ COVID-19 (SARS-COV-2), NP SWAB IN UTM/VTP, 2 HR TAT - Normal    Narrative:     In the setting of a positive respiratory panel with a viral infection PLUS a negative procalcitonin without other underlying concern for bacterial infection, consider observing off antibiotics or discontinuation of antibiotics and continue  supportive care. If the respiratory panel is positive for atypical bacterial infection (Bordetella pertussis, Chlamydophila pneumoniae, or Mycoplasma pneumoniae), consider antibiotic de-escalation to target atypical bacterial infection.   BNP (IN-HOUSE) - Normal    Narrative:     This assay is used as an aid in the diagnosis of individuals suspected of having heart failure. It can be used as an aid in the diagnosis of acute decompensated heart failure (ADHF) in patients presenting with signs and symptoms of ADHF to the emergency department (ED). In addition, NT-proBNP of <300 pg/mL indicates ADHF is not likely.    Age Range Result Interpretation  NT-proBNP Concentration (pg/mL:      <50             Positive            >450                   Gray                 300-450                    Negative             <300    50-75           Positive            >900                  Gray                300-900                  Negative            <300      >75             Positive            >1800                  Gray                300-1800                  Negative            <300   LACTIC ACID, PLASMA - Normal   RAINBOW DRAW    Narrative:     The following orders were created for panel order [place] Draw.  Procedure                               Abnormality         Status                     ---------                               -----------         ------                     Green Top (Gel)[[id number]]                                  Final result               Lavender Top[[id number]]                                     Final result               Gold Top - SST[[id number]]                                   Final result               Cramer Top[[id number]]                                         Final result               Light Blue Top[[id number]]                                   Final result                 Please view results for these tests on the individual orders.   HIGH SENSITIVITIY TROPONIN T 1HR    Narrative:      High Sensitive Troponin T Reference Range:  <14.0 ng/L- Negative Female for AMI  <22.0 ng/L- Negative Male for AMI  >=14 - Abnormal Female indicating possible myocardial injury.  >=22 - Abnormal Male indicating possible myocardial injury.   Clinicians would have to utilize clinical acumen, EKG, Troponin, and serial changes to determine if it is an Acute Myocardial Infarction or myocardial injury due to an underlying chronic condition.        BLOOD GAS, ARTERIAL   CBC AND DIFFERENTIAL    Narrative:     The following orders were created for panel order CBC & Differential.  Procedure                               Abnormality         Status                     ---------                               -----------         ------                     CBC Auto Differential[[id number]]        Abnormal            Final result                 Please view results for these tests on the individual orders.   GREEN TOP   LAVENDER TOP   GOLD TOP - SST   GRAY TOP   LIGHT BLUE TOP       Meds Given in ED:   Medications   sodium chloride 0.9 % flush 10 mL (has no administration in time range)   ipratropium-albuterol (DUO-NEB) nebulizer solution 3 mL (3 mL Nebulization Given 6/6/25 1123)   methylPREDNISolone sodium succinate (SOLU-Medrol) injection 125 mg (125 mg Intravenous Given 6/6/25 1143)   ipratropium-albuterol (DUO-NEB) nebulizer solution 3 mL (3 mL Nebulization Given 6/6/25 1128)   sterile water (preservative free) injection (10 mL  Given 6/6/25 1143)   iopamidol (ISOVUE-370) 76 % injection 100 mL (85 mL Intravenous Given 6/6/25 1512)           Last NIH score:                                                          Dysphagia screening results:        [place] Coma Scale:  No data recorded     CIWA:        Restraint Type:            Isolation Status:  No active isolations

## 2025-06-06 NOTE — FSED PROVIDER NOTE
Subjective  History of Present Illness:    69 year old female hx of COPD, MAC (currently on amikacin) who presents with dypsnea x 3 days. She started taking amikacin three weeks ago. She spoke with her ID physician Dr. Hutchinson who recommended she come to ER for evaluation bc he's concerned she's developed side effects from her amikacin. He advised she receive breathing treatments and course of steroids. Patient denies fevers, chest pain, hemoptysis. She is currently on eliquis for DVT      Nurses Notes reviewed and agree, including vitals, allergies, social history and prior medical history.     REVIEW OF SYSTEMS: All systems reviewed and not pertinent unless noted.  Review of Systems   All other systems reviewed and are negative.      Past Medical History:   Diagnosis Date    Cataract 2017    COPD (chronic obstructive pulmonary disease) May2020    COVID-19 virus infection 12/30/2024    Deep vein thrombosis April2024    GERD (gastroesophageal reflux disease)     Hyperlipidemia     Hypertension     Migraines     Occipital neuralgia of left side     Pneumonia     PONV (postoperative nausea and vomiting)     Shingles     Stroke 2014    no residual effects    Umbilical hernia        Allergies:    Erythromycin, Amlodipine, Lisinopril, and Sulfa antibiotics      Past Surgical History:   Procedure Laterality Date    BRONCHOSCOPY N/A 8/19/2024    Procedure: BRONCHOSCOPY WITH BRONCHOALVEOLAR LAVAGE;  Surgeon: Aria Irwin DO;  Location: [facility] ENDOSCOPY;  Service: Pulmonary;  Laterality: N/A;    CATARACT EXTRACTION Bilateral     COLONOSCOPY      FOOT SURGERY Left     x4    HYSTERECTOMY  2000    SUBTOTAL HYSTERECTOMY           Social History     Socioeconomic History    Marital status: Single   Tobacco Use    Smoking status: Former     Current packs/day: 0.00     Average packs/day: 1 pack/day for 40.0 years (40.0 ttl pk-yrs)     Types: Cigarettes     Start date: 8/4/1974     Quit date: 8/4/2014     Years since  "quitting: 10.8     Passive exposure: Past    Smokeless tobacco: Never   Vaping Use    Vaping status: Never Used   Substance and Sexual Activity    Alcohol use: Yes     Comment: occ.    Drug use: No    Sexual activity: Defer         Family History   Problem Relation Age of Onset    Heart disease Mother     Stroke Mother     Hypertension Mother     Heart disease Father     Hypertension Father     Hearing loss Father     Stroke Maternal Grandfather     No Known Problems Sister     Breast cancer Neg Hx     Ovarian cancer Neg Hx        Objective  Physical Exam:  /69 (BP Location: Left arm, Patient Position: Sitting)   Pulse 95   Temp 98.1 °F (36.7 °C) (Oral)   Resp 14   Ht 167.6 cm (66\")   Wt 79.4 kg (175 lb)   LMP  (LMP Unknown)   SpO2 91%   BMI 28.25 kg/m²      Physical Exam  Constitutional:       Appearance: Normal appearance.   HENT:      Head: Normocephalic and atraumatic.      Nose: Nose normal.      Mouth/Throat:      Mouth: Mucous membranes are moist.   Eyes:      Extraocular Movements: Extraocular movements intact.      Conjunctiva/sclera: Conjunctivae normal.   Cardiovascular:      Rate and Rhythm: Normal rate and regular rhythm.   Pulmonary:      Effort: Pulmonary effort is normal. No tachypnea or respiratory distress.      Breath sounds: No wheezing.   Abdominal:      General: There is no distension.      Comments: Atraumatic    Musculoskeletal:         General: Normal range of motion.      Cervical back: Normal range of motion and neck supple.   Skin:     General: Skin is warm and dry.   Neurological:      General: No focal deficit present.      Mental Status: She is alert.      Comments: Moving all extremities spontaneously    Psychiatric:         Mood and Affect: Mood normal.         Behavior: Behavior normal.         Procedures    ED Course:         Lab Results (last 24 hours)       Procedure Component Value Units Date/Time    CBC & Differential [[id number]]  (Abnormal) Collected: 06/06/25 " 1101    Specimen: Blood Updated: 06/06/25 1121    Narrative:      The following orders were created for panel order CBC & Differential.  Procedure                               Abnormality         Status                     ---------                               -----------         ------                     CBC Auto Differential[[id number]]        Abnormal            Final result                 Please view results for these tests on the individual orders.    Comprehensive Metabolic Panel [[id number]]  (Abnormal) Collected: 06/06/25 1101    Specimen: Blood Updated: 06/06/25 1138     Glucose 102 mg/dL      BUN 18.6 mg/dL      Creatinine 1.12 mg/dL      Sodium 141 mmol/L      Potassium 3.9 mmol/L      Chloride 104 mmol/L      CO2 24.1 mmol/L      Calcium 9.6 mg/dL      Total Protein 7.3 g/dL      Albumin 4.4 g/dL      ALT (SGPT) 12 U/L      AST (SGOT) 17 U/L      Alkaline Phosphatase 82 U/L      Total Bilirubin 0.5 mg/dL      Globulin 2.9 gm/dL      A/G Ratio 1.5 g/dL      BUN/Creatinine Ratio 16.6     Anion Gap 12.9 mmol/L      eGFR 53.3 mL/min/1.73     Narrative:      GFR Categories in Chronic Kidney Disease (CKD)              GFR Category          GFR (mL/min/1.73)    Interpretation  G1                    90 or greater        Normal or high (1)  G2                    60-89                Mild decrease (1)  G3a                   45-59                Mild to moderate decrease  G3b                   30-44                Moderate to severe decrease  G4                    15-29                Severe decrease  G5                    14 or less           Kidney failure    (1)In the absence of evidence of kidney disease, neither GFR category G1 or G2 fulfill the criteria for CKD.    eGFR calculation 2021 CKD-EPI creatinine equation, which does not include race as a factor    BNP [[id number]]  (Normal) Collected: 06/06/25 1101    Specimen: Blood Updated: 06/06/25 1134     proBNP 132.0 pg/mL     Narrative:      This assay is  used as an aid in the diagnosis of individuals suspected of having heart failure. It can be used as an aid in the diagnosis of acute decompensated heart failure (ADHF) in patients presenting with signs and symptoms of ADHF to the emergency department (ED). In addition, NT-proBNP of <300 pg/mL indicates ADHF is not likely.    Age Range Result Interpretation  NT-proBNP Concentration (pg/mL:      <50             Positive            >450                   Gray                 300-450                    Negative             <300    50-75           Positive            >900                  Gray                300-900                  Negative            <300      >75             Positive            >1800                  Gray                300-1800                  Negative            <300    High Sensitivity Troponin T [[id number]]  (Abnormal) Collected: 06/06/25 1101    Specimen: Blood Updated: 06/06/25 1134     HS Troponin T 16 ng/L     CBC Auto Differential [[id number]]  (Abnormal) Collected: 06/06/25 1101    Specimen: Blood Updated: 06/06/25 1121     WBC 8.13 10*3/mm3      RBC 4.21 10*6/mm3      Hemoglobin 11.2 g/dL      Hematocrit 35.4 %      MCV 84.1 fL      MCH 26.6 pg      MCHC 31.6 g/dL      RDW 15.2 %      RDW-SD 47.2 fl      MPV 10.0 fL      Platelets 261 10*3/mm3      Neutrophil % 63.0 %      Lymphocyte % 19.8 %      Monocyte % 7.0 %      Eosinophil % 9.1 %      Basophil % 1.0 %      Immature Grans % 0.1 %      Neutrophils, Absolute 5.12 10*3/mm3      Lymphocytes, Absolute 1.61 10*3/mm3      Monocytes, Absolute 0.57 10*3/mm3      Eosinophils, Absolute 0.74 10*3/mm3      Basophils, Absolute 0.08 10*3/mm3      Immature Grans, Absolute 0.01 10*3/mm3     Lactic Acid, Plasma [[id number]]  (Normal) Collected: 06/06/25 1101    Specimen: Blood Updated: 06/06/25 1138     Lactate 1.6 mmol/L     Respiratory Panel PCR w/COVID-19(SARS-CoV-2) UMAIR/NIRU/SOPHIA/PAD/COR/IBIS In-House, NP Swab in UTM/VTM, 2 HR TAT - Swab, Nasopharynx  [[id number]]  (Normal) Collected: 06/06/25 1118    Specimen: Swab from Nasopharynx Updated: 06/06/25 1212     ADENOVIRUS, PCR Not Detected     Coronavirus 229E Not Detected     Coronavirus HKU1 Not Detected     Coronavirus NL63 Not Detected     Coronavirus OC43 Not Detected     COVID19 Not Detected     Human Metapneumovirus Not Detected     Human Rhinovirus/Enterovirus Not Detected     Influenza A PCR Not Detected     Influenza B PCR Not Detected     Parainfluenza Virus 1 Not Detected     Parainfluenza Virus 2 Not Detected     Parainfluenza Virus 3 Not Detected     Parainfluenza Virus 4 Not Detected     RSV, PCR Not Detected     Bordetella pertussis pcr Not Detected     Bordetella parapertussis PCR Not Detected     Chlamydophila pneumoniae PCR Not Detected     Mycoplasma pneumo by PCR Not Detected    Narrative:      In the setting of a positive respiratory panel with a viral infection PLUS a negative procalcitonin without other underlying concern for bacterial infection, consider observing off antibiotics or discontinuation of antibiotics and continue supportive care. If the respiratory panel is positive for atypical bacterial infection (Bordetella pertussis, Chlamydophila pneumoniae, or Mycoplasma pneumoniae), consider antibiotic de-escalation to target atypical bacterial infection.    High Sensitivity Troponin T 1Hr [[id number]] Collected: 06/06/25 1202    Specimen: Blood Updated: 06/06/25 1226     HS Troponin T 13 ng/L      Troponin T Numeric Delta -3 ng/L      Troponin T % Delta -19    Narrative:      High Sensitive Troponin T Reference Range:  <14.0 ng/L- Negative Female for AMI  <22.0 ng/L- Negative Male for AMI  >=14 - Abnormal Female indicating possible myocardial injury.  >=22 - Abnormal Male indicating possible myocardial injury.   Clinicians would have to utilize clinical acumen, EKG, Troponin, and serial changes to determine if it is an Acute Myocardial Infarction or myocardial injury due to an underlying  chronic condition.         Blood Gas, Arterial With Co-Ox [[id number]]  (Abnormal) Collected: 06/06/25 1333    Specimen: Arterial Blood Updated: 06/06/25 1333     Site Right Radial     Eric's Test N/A     pH, Arterial 7.417 pH units      pCO2, Arterial 36.5 mm Hg      pO2, Arterial 91.3 mm Hg      HCO3, Arterial 23.5 mmol/L      Base Excess, Arterial -0.8 mmol/L      Hemoglobin, Blood Gas 10.9 g/dL      Comment: 84 Value below reference range        Hematocrit, Blood Gas 33.5 %      Oxyhemoglobin 97.0 %      Methemoglobin --     Comment: 94 Value below reportable range < _0.1        Carboxyhemoglobin 0.9 %      CO2 Content 24.6 mmol/L      Temperature 37.0     Barometric Pressure for Blood Gas --     Comment: N/A        Modality Room Air     FIO2 28 %      Rate 0 Breaths/minute      PIP 0 cmH2O      Comment: Meter: [id number]     :  [id number]        IPAP 0     EPAP 0     pH, Temp Corrected 7.417 pH Units      pCO2, Temperature Corrected 36.5 mm Hg      pO2, Temperature Corrected 91.3 mm Hg              CT Angiogram Chest Pulmonary Embolism  Result Date: 6/6/2025  CT ANGIOGRAM CHEST PULMONARY EMBOLISM Date of Exam: 6/6/2025 2:58 PM EDT Indication: Pulmonary Embolism. Comparison: CT chest 3/29/2025 Technique: Axial CT images were obtained of the chest after the uneventful intravenous administration of 85 mL Isovue-370 utilizing pulmonary embolism protocol.  In addition, a 3-D volume rendered image was created for interpretation.  Reconstructed coronal and sagittal images were also obtained. Automated exposure control and iterative construction methods were used. Findings: Normal appearance of the pulmonary arteries without evidence of pulmonary embolism. There is atherosclerosis of the descending thoracic aorta and aortic arch. No pericardial effusion. There are coronary artery calcifications. No thoracic adenopathy. Chest wall soft tissues are unremarkable. The trachea and mainstem bronchi are patent.  The smaller peripheral airways are unremarkable. Redemonstration of numerous centrilobular and subpleural clusters of tree-in-bud nodularity and mostly subcentimeter nodularity in both lungs. This overall appears improved although some of the nodules appear slightly increased compared to prior, for example a triangular subpleural 10 mm nodule in the lateral right lower lobe (series 4 image 69), previously measuring approximately 7 mm. Redemonstration of confluent subpleural nodularity at the posterior right lung base (series 4 image 90). No pleural effusion. No pneumothorax. No acute or suspicious bony findings. There is a loop recorder device in the subcutaneous tissues of the left anterior chest. Unremarkable appearance of the partially imaged upper abdomen.     Impression: Impression: No evidence of pulmonary embolism. Redemonstration of numerous centrilobular and subpleural clusters of tree-in-bud nodularity and mostly subcentimeter nodularity in both lungs. This appears overall improved although some of the nodules appear slightly increased compared to prior. This appearance is most compatible with chronic infectious or inflammatory process. Electronically Signed: August King MD  6/6/2025 3:37 PM EDT  Workstation ID: [id number]    XR Chest 1 View  Result Date: 6/6/2025  XR CHEST 1 VW Date of Exam: 6/6/2025 11:16 AM EDT Indication: SOA triage protocol Comparison: 3/19/2025 Findings: Heart size is at the upper limits of normal. Pulmonary vessels are normal. There is improving airspace disease within the right lung. Left lung is clear. There are no pleural effusions. No pneumothorax.     Impression: Impression: 1. Improving nodular right-sided airspace disease. No new or worsening airspace consolidation. No pleural effusion. Electronically Signed: Golden Camargo MD  6/6/2025 12:00 PM EDT  Workstation ID: [id number]         MDM     Amount and/or Complexity of Data Reviewed  Decide to obtain previous medical records  or to obtain history from someone other than the patient: yes            DDX: includes but is not limited to: medication side effect, pna, viral illness    Pertinent features from physical exam: VSS, patient resting comfortably in bed.    Initial diagnostic plan: labs, ct pe    Results from initial plan were reviewed and interpreted by me revealing labs nonactionable. Repeat trop improving. Patient is not having any chest pain. EKG does not show acute ischemia.   Cxr shows improving disease.   CT PE unrevealing for PE  Patient is still requiring oxygen-2 L which is new for her  Diagnostic information from other sources: stas review    Interventions / Re-evaluation: duoneb, solumedrol    Medications   sodium chloride 0.9 % flush 10 mL (has no administration in time range)   ipratropium-albuterol (DUO-NEB) nebulizer solution 3 mL (3 mL Nebulization Given 6/6/25 1123)   methylPREDNISolone sodium succinate (SOLU-Medrol) injection 125 mg (125 mg Intravenous Given 6/6/25 1143)   ipratropium-albuterol (DUO-NEB) nebulizer solution 3 mL (3 mL Nebulization Given 6/6/25 1128)   sterile water (preservative free) injection (10 mL  Given 6/6/25 1143)   iopamidol (ISOVUE-370) 76 % injection 100 mL (85 mL Intravenous Given 6/6/25 1512)       Results/clinical rationale were discussed with patient     Consultations/Discussion of results with other physicians: Dr. Bautista accepts    Data interpreted: Nursing notes reviewed, vital signs reviewed.  Labs independently interpreted by me .  Imaging independently interpreted by me.  EKG independently interpreted by me.      Counseling: Discussed the results above with the patient regarding need for admission or discharge.  Patient understands and agrees plan of care.      -----  ED Disposition       ED Disposition   Decision to Admit    Condition   --    Comment   Level of Care: Telemetry [5]   Accepting Provider:: ERNESTO BAUTISTA [[id number]]               Final diagnoses:   Acute hypoxic  respiratory failure     Your Follow-Up Providers    Follow-up information has not been specified.       Contact information for after-discharge care    Follow-up information has not been specified.          Your medication list        ASK your doctor about these medications        Instructions Last Dose Given Next Dose Due   albuterol sulfate  (90 Base) MCG/ACT inhaler  Commonly known as: PROVENTIL HFA;VENTOLIN HFA;PROAIR HFA      INHALE 2 PUFFS BY MOUTH EVERY 4 HOURS AS NEEDED FOR WHEEZING       Arikayce 590 MG/8.4ML suspension  Generic drug: Amikacin Sulfate Liposome           aspirin 81 MG tablet      Take 1 tablet by mouth Daily.       atorvastatin 40 MG tablet  Commonly known as: LIPITOR      Take 1 tablet by mouth Daily.       azithromycin 500 MG tablet  Commonly known as: ZITHROMAX      Take 1 tablet by mouth Daily.       Breztri Aerosphere 160-9-4.8 MCG/ACT aerosol inhaler  Generic drug: Budeson-Glycopyrrol-Formoterol      INHALE 2 PUFFS BY MOUTH TWICE DAILY       butalbital-acetaminophen  MG tablet tablet      Take 1-2 tablets by mouth Every 6 (Six) Hours As Needed (migraines).       chlorthalidone 25 MG tablet  Commonly known as: HYGROTON      TAKE 1 TABLET BY MOUTH DAILY       Eliquis 5 MG tablet tablet  Generic drug: apixaban      TAKE 1 TABLET BY MOUTH TWICE DAILY       ethambutol 400 MG tablet  Commonly known as: MYAMBUTOL      Take 3 tablets by mouth Daily.       Hydrocod Aly-Chlorphe Aly ER 10-8 MG/5ML ER suspension  Commonly known as: TUSSIONEX PENNKINETIC      TAKE 5ML BY MOUTH EVERY 12 HOURS AS NEEDED FOR COUGH       ipratropium-albuterol 0.5-2.5 mg/3 ml nebulizer  Commonly known as: DUO-NEB      Take 3 mL by nebulization 4 (Four) Times a Day As Needed for Wheezing.       losartan 100 MG tablet  Commonly known as: COZAAR      Take 1 tablet by mouth Daily.       pioglitazone 15 MG tablet  Commonly known as: Actos      Take 1 tablet by mouth Daily.       prochlorperazine 5 MG  tablet  Commonly known as: COMPAZINE      Take 1-2 tablets by mouth Every 6 (Six) Hours As Needed for Nausea or Vomiting (migraine).

## 2025-06-07 LAB
ANION GAP SERPL CALCULATED.3IONS-SCNC: 16 MMOL/L (ref 5–15)
BASOPHILS # BLD AUTO: 0.03 10*3/MM3 (ref 0–0.2)
BASOPHILS NFR BLD AUTO: 0.3 % (ref 0–1.5)
BUN SERPL-MCNC: 19.8 MG/DL (ref 8–23)
BUN/CREAT SERPL: 18.5 (ref 7–25)
CALCIUM SPEC-SCNC: 9.2 MG/DL (ref 8.6–10.5)
CHLORIDE SERPL-SCNC: 103 MMOL/L (ref 98–107)
CO2 SERPL-SCNC: 22 MMOL/L (ref 22–29)
CREAT SERPL-MCNC: 1.07 MG/DL (ref 0.57–1)
DEPRECATED RDW RBC AUTO: 46.3 FL (ref 37–54)
EGFRCR SERPLBLD CKD-EPI 2021: 56.3 ML/MIN/1.73
EOSINOPHIL # BLD AUTO: 0.02 10*3/MM3 (ref 0–0.4)
EOSINOPHIL NFR BLD AUTO: 0.2 % (ref 0.3–6.2)
ERYTHROCYTE [DISTWIDTH] IN BLOOD BY AUTOMATED COUNT: 15.2 % (ref 12.3–15.4)
GLUCOSE BLDC GLUCOMTR-MCNC: 147 MG/DL (ref 70–130)
GLUCOSE BLDC GLUCOMTR-MCNC: 149 MG/DL (ref 70–130)
GLUCOSE BLDC GLUCOMTR-MCNC: 167 MG/DL (ref 70–130)
GLUCOSE BLDC GLUCOMTR-MCNC: 175 MG/DL (ref 70–130)
GLUCOSE SERPL-MCNC: 192 MG/DL (ref 65–99)
HCT VFR BLD AUTO: 32.5 % (ref 34–46.6)
HGB BLD-MCNC: 10.3 G/DL (ref 12–15.9)
IMM GRANULOCYTES # BLD AUTO: 0.11 10*3/MM3 (ref 0–0.05)
IMM GRANULOCYTES NFR BLD AUTO: 1.1 % (ref 0–0.5)
LYMPHOCYTES # BLD AUTO: 0.84 10*3/MM3 (ref 0.7–3.1)
LYMPHOCYTES NFR BLD AUTO: 8.5 % (ref 19.6–45.3)
MCH RBC QN AUTO: 26.5 PG (ref 26.6–33)
MCHC RBC AUTO-ENTMCNC: 31.7 G/DL (ref 31.5–35.7)
MCV RBC AUTO: 83.5 FL (ref 79–97)
MONOCYTES # BLD AUTO: 0.19 10*3/MM3 (ref 0.1–0.9)
MONOCYTES NFR BLD AUTO: 1.9 % (ref 5–12)
NEUTROPHILS NFR BLD AUTO: 8.69 10*3/MM3 (ref 1.7–7)
NEUTROPHILS NFR BLD AUTO: 88 % (ref 42.7–76)
NRBC BLD AUTO-RTO: 0 /100 WBC (ref 0–0.2)
PLATELET # BLD AUTO: 234 10*3/MM3 (ref 140–450)
PMV BLD AUTO: 10 FL (ref 6–12)
POTASSIUM SERPL-SCNC: 4.1 MMOL/L (ref 3.5–5.2)
RBC # BLD AUTO: 3.89 10*6/MM3 (ref 3.77–5.28)
SODIUM SERPL-SCNC: 141 MMOL/L (ref 136–145)
WBC NRBC COR # BLD AUTO: 9.88 10*3/MM3 (ref 3.4–10.8)

## 2025-06-07 PROCEDURE — 82948 REAGENT STRIP/BLOOD GLUCOSE: CPT

## 2025-06-07 PROCEDURE — 85025 COMPLETE CBC W/AUTO DIFF WBC: CPT | Performed by: NURSE PRACTITIONER

## 2025-06-07 PROCEDURE — G0378 HOSPITAL OBSERVATION PER HR: HCPCS

## 2025-06-07 PROCEDURE — 25810000003 SODIUM CHLORIDE 0.9 % SOLUTION 250 ML FLEX CONT: Performed by: NURSE PRACTITIONER

## 2025-06-07 PROCEDURE — 63710000001 PREDNISONE PER 1 MG: Performed by: NURSE PRACTITIONER

## 2025-06-07 PROCEDURE — 80048 BASIC METABOLIC PNL TOTAL CA: CPT | Performed by: NURSE PRACTITIONER

## 2025-06-07 PROCEDURE — 99232 SBSQ HOSP IP/OBS MODERATE 35: CPT | Performed by: INTERNAL MEDICINE

## 2025-06-07 PROCEDURE — 25010000002 AZITHROMYCIN PER 500 MG: Performed by: NURSE PRACTITIONER

## 2025-06-07 PROCEDURE — 94799 UNLISTED PULMONARY SVC/PX: CPT

## 2025-06-07 PROCEDURE — 94761 N-INVAS EAR/PLS OXIMETRY MLT: CPT

## 2025-06-07 PROCEDURE — 94664 DEMO&/EVAL PT USE INHALER: CPT

## 2025-06-07 PROCEDURE — 63710000001 INSULIN LISPRO (HUMAN) PER 5 UNITS: Performed by: NURSE PRACTITIONER

## 2025-06-07 PROCEDURE — 25010000002 AMPICILLIN-SULBACTAM PER 1.5 G: Performed by: NURSE PRACTITIONER

## 2025-06-07 RX ORDER — DOXYCYCLINE 100 MG/1
100 CAPSULE ORAL EVERY 12 HOURS SCHEDULED
Status: DISCONTINUED | OUTPATIENT
Start: 2025-06-07 | End: 2025-06-09 | Stop reason: HOSPADM

## 2025-06-07 RX ADMIN — IPRATROPIUM BROMIDE AND ALBUTEROL SULFATE 3 ML: 2.5; .5 SOLUTION RESPIRATORY (INHALATION) at 12:01

## 2025-06-07 RX ADMIN — ASPIRIN 81 MG: 81 TABLET, COATED ORAL at 08:04

## 2025-06-07 RX ADMIN — BUDESONIDE 0.5 MG: 0.5 SUSPENSION RESPIRATORY (INHALATION) at 07:54

## 2025-06-07 RX ADMIN — APIXABAN 5 MG: 5 TABLET, FILM COATED ORAL at 08:04

## 2025-06-07 RX ADMIN — AMPICILLIN SODIUM AND SULBACTAM SODIUM 3000 MG: 2; 1 INJECTION, POWDER, FOR SOLUTION INTRAMUSCULAR; INTRAVENOUS at 09:53

## 2025-06-07 RX ADMIN — IPRATROPIUM BROMIDE AND ALBUTEROL SULFATE 3 ML: 2.5; .5 SOLUTION RESPIRATORY (INHALATION) at 19:32

## 2025-06-07 RX ADMIN — INSULIN LISPRO 2 UNITS: 100 INJECTION, SOLUTION INTRAVENOUS; SUBCUTANEOUS at 16:37

## 2025-06-07 RX ADMIN — ATORVASTATIN CALCIUM 40 MG: 40 TABLET, FILM COATED ORAL at 08:04

## 2025-06-07 RX ADMIN — HYDROCODONE POLISTIREX AND CHLORPHENIRAMINE POLISTIREX 5 ML: 10; 8 SUSPENSION, EXTENDED RELEASE ORAL at 23:19

## 2025-06-07 RX ADMIN — IPRATROPIUM BROMIDE AND ALBUTEROL SULFATE 3 ML: 2.5; .5 SOLUTION RESPIRATORY (INHALATION) at 00:23

## 2025-06-07 RX ADMIN — Medication 10 ML: at 20:16

## 2025-06-07 RX ADMIN — BUDESONIDE 0.5 MG: 0.5 SUSPENSION RESPIRATORY (INHALATION) at 19:32

## 2025-06-07 RX ADMIN — AZITHROMYCIN DIHYDRATE 500 MG: 500 INJECTION, POWDER, LYOPHILIZED, FOR SOLUTION INTRAVENOUS at 08:38

## 2025-06-07 RX ADMIN — PREDNISONE 40 MG: 20 TABLET ORAL at 07:08

## 2025-06-07 RX ADMIN — IPRATROPIUM BROMIDE AND ALBUTEROL SULFATE 3 ML: 2.5; .5 SOLUTION RESPIRATORY (INHALATION) at 07:54

## 2025-06-07 RX ADMIN — INSULIN LISPRO 2 UNITS: 100 INJECTION, SOLUTION INTRAVENOUS; SUBCUTANEOUS at 08:03

## 2025-06-07 RX ADMIN — DOXYCYCLINE 100 MG: 100 CAPSULE ORAL at 20:16

## 2025-06-07 RX ADMIN — DOXYCYCLINE 100 MG: 100 CAPSULE ORAL at 11:55

## 2025-06-07 RX ADMIN — APIXABAN 5 MG: 5 TABLET, FILM COATED ORAL at 20:16

## 2025-06-07 RX ADMIN — AMPICILLIN SODIUM AND SULBACTAM SODIUM 3000 MG: 2; 1 INJECTION, POWDER, FOR SOLUTION INTRAMUSCULAR; INTRAVENOUS at 04:44

## 2025-06-07 NOTE — PROGRESS NOTES
[facility] Medicine Services  PROGRESS NOTE    Patient Name: Agustina Reyes  : 1955  MRN: [id number]    Date of Admission: 2025  Primary Care Physician: Rich Zaragoza MD    Subjective   Subjective     CC:  Shortness of breath    HPI:  Chest tightness and shortness of breath improved this morning.  Cough, slightly productive.  No fevers.      Objective   Objective     Vital Signs:   Temp:  [96.7 °F (35.9 °C)-98.4 °F (36.9 °C)] 96.7 °F (35.9 °C)  Heart Rate:  [] 77  Resp:  [10-20] 16  BP: (113-172)/(41-83) 135/53  Flow (L/min) (Oxygen Therapy):  [2-3] 2     Physical Exam:  Nontoxic, sitting up in bed  Mucous membranes moist  RRR  Slightly diminished breath sounds bilaterally with very rare/tight wheezes  Abdomen soft, nontender  Awake, speech clear, sitting up in bed  Normal affect      Results Reviewed:  LAB RESULTS:      Lab 25  0405 25  1202 25  1101   WBC 9.88  --  8.13   HEMOGLOBIN 10.3*  --  11.2*   HEMATOCRIT 32.5*  --  35.4   PLATELETS 234  --  261   NEUTROS ABS 8.69*  --  5.12   IMMATURE GRANS (ABS) 0.11*  --  0.01   LYMPHS ABS 0.84  --  1.61   MONOS ABS 0.19  --  0.57   EOS ABS 0.02  --  0.74*   MCV 83.5  --  84.1   LACTATE  --   --  1.6   HSTROP T  --  13 16*         Lab 25  0405 25  1101   SODIUM 141 141   POTASSIUM 4.1 3.9   CHLORIDE 103 104   CO2 22.0 24.1   ANION GAP 16.0* 12.9   BUN 19.8 18.6   CREATININE 1.07* 1.12*   EGFR 56.3* 53.3*   GLUCOSE 192* 102*   CALCIUM 9.2 9.6         Lab 25  1101   TOTAL PROTEIN 7.3   ALBUMIN 4.4   GLOBULIN 2.9   ALT (SGPT) 12   AST (SGOT) 17   BILIRUBIN 0.5   ALK PHOS 82         Lab 25  1202 25  1101   PROBNP  --  132.0   HSTROP T 13 16*                 Lab 25  1333   PH, ARTERIAL 7.417   PCO2, ARTERIAL 36.5   PO2 ART 91.3   FIO2 28   HCO3 ART 23.5   BASE EXCESS ART -0.8*   CARBOXYHEMOGLOBIN 0.9     Brief Urine Lab Results  (Last result in the past 365 days)         Color   Clarity   Blood   Leuk Est   Nitrite   Protein   CREAT   Urine HCG        09/26/24 1400 Yellow   Clear   Negative   Large (3+)   Negative   Negative                   Microbiology Results Abnormal       None            CT Angiogram Chest Pulmonary Embolism  Result Date: 6/6/2025  CT ANGIOGRAM CHEST PULMONARY EMBOLISM Date of Exam: 6/6/2025 2:58 PM EDT Indication: Pulmonary Embolism. Comparison: CT chest 3/29/2025 Technique: Axial CT images were obtained of the chest after the uneventful intravenous administration of 85 mL Isovue-370 utilizing pulmonary embolism protocol.  In addition, a 3-D volume rendered image was created for interpretation.  Reconstructed coronal and sagittal images were also obtained. Automated exposure control and iterative construction methods were used. Findings: Normal appearance of the pulmonary arteries without evidence of pulmonary embolism. There is atherosclerosis of the descending thoracic aorta and aortic arch. No pericardial effusion. There are coronary artery calcifications. No thoracic adenopathy. Chest wall soft tissues are unremarkable. The trachea and mainstem bronchi are patent. The smaller peripheral airways are unremarkable. Redemonstration of numerous centrilobular and subpleural clusters of tree-in-bud nodularity and mostly subcentimeter nodularity in both lungs. This overall appears improved although some of the nodules appear slightly increased compared to prior, for example a triangular subpleural 10 mm nodule in the lateral right lower lobe (series 4 image 69), previously measuring approximately 7 mm. Redemonstration of confluent subpleural nodularity at the posterior right lung base (series 4 image 90). No pleural effusion. No pneumothorax. No acute or suspicious bony findings. There is a loop recorder device in the subcutaneous tissues of the left anterior chest. Unremarkable appearance of the partially imaged upper abdomen.     Impression: Impression:  No evidence of pulmonary embolism. Redemonstration of numerous centrilobular and subpleural clusters of tree-in-bud nodularity and mostly subcentimeter nodularity in both lungs. This appears overall improved although some of the nodules appear slightly increased compared to prior. This appearance is most compatible with chronic infectious or inflammatory process. Electronically Signed: August King MD  6/6/2025 3:37 PM EDT  Workstation ID: [id number]    XR Chest 1 View  Result Date: 6/6/2025  XR CHEST 1 VW Date of Exam: 6/6/2025 11:16 AM EDT Indication: SOA triage protocol Comparison: 3/19/2025 Findings: Heart size is at the upper limits of normal. Pulmonary vessels are normal. There is improving airspace disease within the right lung. Left lung is clear. There are no pleural effusions. No pneumothorax.     Impression: Impression: 1. Improving nodular right-sided airspace disease. No new or worsening airspace consolidation. No pleural effusion. Electronically Signed: Golden Camargo MD  6/6/2025 12:00 PM EDT  Workstation ID: [id number]          Current medications:  Scheduled Meds:ampicillin-sulbactam, 3,000 mg, Intravenous, Q6H  apixaban, 5 mg, Oral, BID  aspirin, 81 mg, Oral, Daily  atorvastatin, 40 mg, Oral, Daily  azithromycin, 500 mg, Intravenous, Daily  budesonide, 0.5 mg, Nebulization, BID - RT  chlorthalidone, 25 mg, Oral, Daily  insulin lispro, 2-7 Units, Subcutaneous, 4x Daily AC & at Bedtime  ipratropium-albuterol, 3 mL, Nebulization, Q6H - RT  losartan, 100 mg, Oral, Daily  predniSONE, 40 mg, Oral, Daily With Breakfast  sodium chloride, 10 mL, Intravenous, Q12H      Continuous Infusions:   PRN Meds:.  acetaminophen **OR** acetaminophen **OR** acetaminophen    senna-docusate sodium **AND** polyethylene glycol **AND** bisacodyl **AND** bisacodyl    dextrose    dextrose    glucagon (human recombinant)    guaiFENesin-dextromethorphan    Hydrocod Aly-Chlorphe Aly ER    ipratropium-albuterol    melatonin     nitroglycerin    ondansetron ODT **OR** ondansetron    sodium chloride    sodium chloride    sodium chloride    Assessment & Plan   Assessment & Plan     Active Hospital Problems    Diagnosis  POA    **Dyspnea [R06.00]  Yes    COPD exacerbation [J44.1]  Yes    Medication reaction [T50.905A]  Unknown    Chronic deep vein thrombosis (DVT) [I82.509]  Unknown    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        Brief Hospital Course to date:  Agustina Reyes is a 69 y.o. adult with history of COPD, GERD, hypertension, hyperlipidemia, CVA, migraines, chronic DVT on Eliquis and MAC infection who presented with shortness of breath.  Patient been started on varicase approximately 3 weeks ago for her MAC.  She initially had some hoarseness with this medication which improved but over the past 3 days prior to admission she developed shortness of breath and chest tightness.       Bronchospasm  COPD  MAC  -Respiratory PCR panel negative  -CT chest shows tree-in-bud nodularity somewhat improved to prior scan from 3/29/2025  -Bronchospasm improved with steroids, budesonide and DuoNebs  - Also currently on empiric Unasyn and azithromycin  - Arikayce and Ethambutol currently held  - ID consult    HTN    HLD    Chronic DVT  - eliquis      Expected Discharge Location and Transportation: home  Expected Discharge   Expected Discharge Date: 6/8/2025; Expected Discharge Time:      VTE Prophylaxis:  Pharmacologic & mechanical VTE prophylaxis orders are present.         AM-PAC 6 Clicks Score (PT): 24 (06/07/25 0708)    CODE STATUS:   Code Status and Medical Interventions: CPR (Attempt to Resuscitate); Full Support   Ordered at: 06/06/25 2034     Code Status (Patient has no pulse and is not breathing):     CPR (Attempt to Resuscitate)     Medical Interventions (Patient has pulse or is breathing):    Full Support       Zheng Stevenson MD  06/07/25

## 2025-06-07 NOTE — H&P
[facility] Medicine Services  HISTORY AND PHYSICAL    Patient Name: Agustina Reyes  : 1955  MRN: [id number]  Primary Care Physician: Rich Zaragoza MD  Date of admission: 2025    Subjective   Subjective     Chief Complaint:  Shortness of breath    HPI:  Agustina Reyes is a 69 y.o. adult with PMHx of COPD, GERD, HLD, HTN, CVA, migraines, chronic DVT (on Eliquis) and MAC infection (on Arikayce f/w [facility]) who presents to the ED for evaluation of shortness of breath. Pt was seen at [facility] and has been transferred to [facility] d/t hypoxia, she will be admitted to the hospital medicine service.     Pt reports starting Arikayce 3 weeks ago, she reports she has had some loss of voice and hoarseness as a side effect of the medication and then for the past 3 days she has been wheezing with shortness of breath, feeling like she can't take a deep breath, feeling like there is tightness in her throat / neck and she is sometimes able to cough up something but not always. She has been using a rescue inhaler at home (albuterol) and her chronic Breztri twice daily as prescribed with worsening symptoms. She called Dr. Noel's office today and was advised to stop the Arikayce and come to the ED for steroids and nebs. No known sick contacts. No fever or chills.     CTA chest at OSH negative for PE, re-demonstration of numerous centrilobular and subpleural clusters of tree in bud nodularity and mostly subcentimeter nodularity in both lungs, overall improved w/ some of the nodules slightly increased; appears compatible w/ chronic infectious or inflammatory process. Respiratory PCR negative. She has been afebrile. Oxygen saturation on room air documented as low as 84% at OSH. Currently low 90's on 2 liters NC. She is still short of breath, does feel the nebs and steroids have helped and she is asking to eat. She will be admitted to hospital medicine service for further  management.    Review of Systems   Constitutional:  Positive for activity change and fatigue. Negative for chills and fever.   Respiratory:  Positive for cough, chest tightness, shortness of breath and wheezing.    Cardiovascular:  Negative for chest pain, palpitations and leg swelling.   All other systems reviewed and are negative.       Personal History     Past Medical History:   Diagnosis Date    Cataract 2017    COPD (chronic obstructive pulmonary disease) May2020    COVID-19 virus infection 12/30/2024    Deep vein thrombosis April2024    GERD (gastroesophageal reflux disease)     Hyperlipidemia     Hypertension     Migraines     Occipital neuralgia of left side     Pneumonia     PONV (postoperative nausea and vomiting)     Shingles     Stroke 2014    no residual effects    Umbilical hernia        Past Surgical History:   Procedure Laterality Date    BRONCHOSCOPY N/A 8/19/2024    Procedure: BRONCHOSCOPY WITH BRONCHOALVEOLAR LAVAGE;  Surgeon: Aria Irwin DO;  Location: [facility] ENDOSCOPY;  Service: Pulmonary;  Laterality: N/A;    CATARACT EXTRACTION Bilateral     COLONOSCOPY      FOOT SURGERY Left     x4    HYSTERECTOMY  2000    SUBTOTAL HYSTERECTOMY         Family History:   family history includes Hearing loss in her father; Heart disease in her father and mother; Hypertension in her father and mother; No Known Problems in her sister; Stroke in her maternal grandfather and mother.     Social History:  reports that she quit smoking about 10 years ago. Her smoking use included cigarettes. She started smoking about 50 years ago. She has a 40 pack-year smoking history. She has been exposed to tobacco smoke. She has never used smokeless tobacco. She reports current alcohol use. She reports that she does not use drugs.  Social History     Social History Narrative    Not on file       Medications:  Amikacin Sulfate Liposome, Budeson-Glycopyrrol-Formoterol, Hydrocod Aly-Chlorphe Aly ER, albuterol sulfate HFA,  apixaban, aspirin, atorvastatin, azithromycin, butalbital-acetaminophen, chlorthalidone, ethambutol, ipratropium-albuterol, losartan, pioglitazone, and prochlorperazine    Allergies   Allergen Reactions    Erythromycin GI Intolerance     Nausea      Amlodipine Other (See Comments)     Pt states she does not remember an allergy to this medicine, states her legs were swelling    Lisinopril Other (See Comments)     Joint pain    Sulfa Antibiotics Unknown (See Comments)     Pt's home pharmacy has sulfa drugs listed as an allergy.       Objective   Objective     Vital Signs:   Temp:  [98.1 °F (36.7 °C)-98.4 °F (36.9 °C)] 98.1 °F (36.7 °C)  Heart Rate:  [72-95] 95  Resp:  [10-20] 14  BP: (113-172)/(41-83) 172/69  Flow (L/min) (Oxygen Therapy):  [2] 2    Physical Exam  Constitutional:       General: She is not in acute distress.     Appearance: She is well-developed.   HENT:      Head: Normocephalic and atraumatic.      Nose: Nose normal.      Mouth/Throat:      Pharynx: Oropharynx is clear.   Eyes:      Extraocular Movements: Extraocular movements intact.      Conjunctiva/sclera: Conjunctivae normal.      Pupils: Pupils are equal, round, and reactive to light.   Cardiovascular:      Rate and Rhythm: Normal rate and regular rhythm.      Pulses: Normal pulses.      Heart sounds: Normal heart sounds. No murmur heard.  Pulmonary:      Effort: Respiratory distress present.      Breath sounds: Wheezing (throughout) present.   Abdominal:      General: Bowel sounds are normal. There is no distension.      Palpations: Abdomen is soft.      Tenderness: There is no abdominal tenderness.   Musculoskeletal:         General: No swelling. Normal range of motion.      Cervical back: Normal range of motion and neck supple.   Skin:     General: Skin is warm and dry.      Capillary Refill: Capillary refill takes less than 2 seconds.      Findings: No rash.   Neurological:      Mental Status: She is alert and oriented to person, place, and  time.      Cranial Nerves: No cranial nerve deficit.   Psychiatric:         Mood and Affect: Mood normal.         Behavior: Behavior normal.             Result Review:  I have personally reviewed the results from the time of this admission to 6/6/2025 21:08 EDT and agree with these findings:  [x]  Laboratory list / accordion  [x]  Microbiology  [x]  Radiology  [x]  EKG/Telemetry   []  Cardiology/Vascular   []  Pathology  []  Old records  []  Other:  Most notable findings include:      LAB RESULTS:      Lab 06/06/25  1101   WBC 8.13   HEMOGLOBIN 11.2*   HEMATOCRIT 35.4   PLATELETS 261   NEUTROS ABS 5.12   IMMATURE GRANS (ABS) 0.01   LYMPHS ABS 1.61   MONOS ABS 0.57   EOS ABS 0.74*   MCV 84.1   LACTATE 1.6         Lab 06/06/25  1101   SODIUM 141   POTASSIUM 3.9   CHLORIDE 104   CO2 24.1   ANION GAP 12.9   BUN 18.6   CREATININE 1.12*   EGFR 53.3*   GLUCOSE 102*   CALCIUM 9.6         Lab 06/06/25  1101   TOTAL PROTEIN 7.3   ALBUMIN 4.4   GLOBULIN 2.9   ALT (SGPT) 12   AST (SGOT) 17   BILIRUBIN 0.5   ALK PHOS 82         Lab 06/06/25  1202 06/06/25  1101   PROBNP  --  132.0   HSTROP T 13 16*                 Lab 06/06/25  1333   PH, ARTERIAL 7.417   PCO2, ARTERIAL 36.5   PO2 ART 91.3   FIO2 28   HCO3 ART 23.5   BASE EXCESS ART -0.8*   CARBOXYHEMOGLOBIN 0.9     Brief Urine Lab Results  (Last result in the past 365 days)        Color   Clarity   Blood   Leuk Est   Nitrite   Protein   CREAT   Urine HCG        09/26/24 1400 Yellow   Clear   Negative   Large (3+)   Negative   Negative                 Microbiology Results (last 10 days)       Procedure Component Value - Date/Time    Respiratory Panel PCR w/COVID-19(SARS-CoV-2) UMAIR/NIRU/SOPHIA/PAD/COR/IBIS In-House, NP Swab in UTM/VTM, 2 HR TAT - Swab, Nasopharynx [[id number]]  (Normal) Collected: 06/06/25 1118    Lab Status: Final result Specimen: Swab from Nasopharynx Updated: 06/06/25 1212     ADENOVIRUS, PCR Not Detected     Coronavirus 229E Not Detected     Coronavirus HKU1 Not  Detected     Coronavirus NL63 Not Detected     Coronavirus OC43 Not Detected     COVID19 Not Detected     Human Metapneumovirus Not Detected     Human Rhinovirus/Enterovirus Not Detected     Influenza A PCR Not Detected     Influenza B PCR Not Detected     Parainfluenza Virus 1 Not Detected     Parainfluenza Virus 2 Not Detected     Parainfluenza Virus 3 Not Detected     Parainfluenza Virus 4 Not Detected     RSV, PCR Not Detected     Bordetella pertussis pcr Not Detected     Bordetella parapertussis PCR Not Detected     Chlamydophila pneumoniae PCR Not Detected     Mycoplasma pneumo by PCR Not Detected    Narrative:      In the setting of a positive respiratory panel with a viral infection PLUS a negative procalcitonin without other underlying concern for bacterial infection, consider observing off antibiotics or discontinuation of antibiotics and continue supportive care. If the respiratory panel is positive for atypical bacterial infection (Bordetella pertussis, Chlamydophila pneumoniae, or Mycoplasma pneumoniae), consider antibiotic de-escalation to target atypical bacterial infection.            CT Angiogram Chest Pulmonary Embolism  Result Date: 6/6/2025  CT ANGIOGRAM CHEST PULMONARY EMBOLISM Date of Exam: 6/6/2025 2:58 PM EDT Indication: Pulmonary Embolism. Comparison: CT chest 3/29/2025 Technique: Axial CT images were obtained of the chest after the uneventful intravenous administration of 85 mL Isovue-370 utilizing pulmonary embolism protocol.  In addition, a 3-D volume rendered image was created for interpretation.  Reconstructed coronal and sagittal images were also obtained. Automated exposure control and iterative construction methods were used. Findings: Normal appearance of the pulmonary arteries without evidence of pulmonary embolism. There is atherosclerosis of the descending thoracic aorta and aortic arch. No pericardial effusion. There are coronary artery calcifications. No thoracic adenopathy.  Chest wall soft tissues are unremarkable. The trachea and mainstem bronchi are patent. The smaller peripheral airways are unremarkable. Redemonstration of numerous centrilobular and subpleural clusters of tree-in-bud nodularity and mostly subcentimeter nodularity in both lungs. This overall appears improved although some of the nodules appear slightly increased compared to prior, for example a triangular subpleural 10 mm nodule in the lateral right lower lobe (series 4 image 69), previously measuring approximately 7 mm. Redemonstration of confluent subpleural nodularity at the posterior right lung base (series 4 image 90). No pleural effusion. No pneumothorax. No acute or suspicious bony findings. There is a loop recorder device in the subcutaneous tissues of the left anterior chest. Unremarkable appearance of the partially imaged upper abdomen.     Impression: Impression: No evidence of pulmonary embolism. Redemonstration of numerous centrilobular and subpleural clusters of tree-in-bud nodularity and mostly subcentimeter nodularity in both lungs. This appears overall improved although some of the nodules appear slightly increased compared to prior. This appearance is most compatible with chronic infectious or inflammatory process. Electronically Signed: August King MD  6/6/2025 3:37 PM EDT  Workstation ID: [id number]    XR Chest 1 View  Result Date: 6/6/2025  XR CHEST 1 VW Date of Exam: 6/6/2025 11:16 AM EDT Indication: SOA triage protocol Comparison: 3/19/2025 Findings: Heart size is at the upper limits of normal. Pulmonary vessels are normal. There is improving airspace disease within the right lung. Left lung is clear. There are no pleural effusions. No pneumothorax.     Impression: Impression: 1. Improving nodular right-sided airspace disease. No new or worsening airspace consolidation. No pleural effusion. Electronically Signed: Golden Camargo MD  6/6/2025 12:00 PM EDT  Workstation ID:  [id number]          Assessment & Plan   Assessment & Plan       Dyspnea    Hypertension    Hyperlipidemia    History of CVA (cerebrovascular accident)    Prediabetes    Mycobacterium intracellulare infection    COPD exacerbation    Medication reaction    Chronic deep vein thrombosis (DVT)    Dyspnea / COPD exacerbation / Hypoxia  Chronic MAC infection w/ concern for medication reaction (Arikayce)  - requiring 2 liters NC w/ oxygen sat low 90's  - CTA chest neg for PE / chronic findings noted above  - stop Arikayce  - hold PO azithromycin / ethambutol (defer to ID, taking along w/ Arikayce for MAC)  - add IV unasyn / IV azithromycin, COPD exacerbation  - ID consult for antibiotic recommendations  - add scheduled duo nebs, budesonide  - PRN duo nebs  - continue methylprednisolone, had 125 mg at OSH, continue 62.5 mg IV daily  - tussinex / robitussin DM prn cough  - sputum culture    Prediabetes  - will add FSBG ACHS w/ low dose SSI while on steroids  - A1c 6.50 2/13/2025  - cardiac / diabetic diet    HTN / HLD / Hx CVA / Chronic DVT  - continue aspirin, apixaban, losartan, chlorthalidone    DVT prophylaxis:  apixaban    CODE STATUS:     Code Status (Patient has no pulse and is not breathing): CPR (Attempt to Resuscitate)  Medical Interventions (Patient has pulse or is breathing): Full Support      Expected Discharge     Expected Discharge Date: 6/8/2025; Expected Discharge Time:     Signature: Electronically signed by JAYNE Martinez, 06/06/25, 9:08 PM EDT    Total time spent: 60 minutes  Time spent includes time reviewing chart, face-to-face time, counseling patient/family/caregiver, ordering medications/tests/procedures, communicating with other health care professionals, documenting clinical information in the electronic health record, and coordination of care.

## 2025-06-07 NOTE — CONSULTS
INFECTIOUS DISEASE CONSULT/INITIAL HOSPITAL VISIT    Agustina Reyes  1955  [id number]    Date of Consult: 6/7/2025    Admission Date: 6/6/2025    Requesting Provider: Veronica GODOY  Evaluating Physician: Rayo Manzo MD    Reason for Consultation: f/u for pulmonary DESMOND    History of present illness:    Patient is a 69 y.o. adult, known to Dr. 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  She was transferred to [facility] for further medical management of hypoxia.  She has had hoarseness and diminished voice since starting Arikayce, but she has had increased wheezing over the last 3 days.  She has had to use her rescue inhaler at home along with Brestri.  She did call [facility] yesterday and was advised to stop the Arikayce and come to ED for steroids and nebulized breathing treatments.  She denied fever or chills.  A CXR showed improving nodular right-sided airspace disease.  A CTA of chest on 6/6 showed numerous centriolobular and subpleural clusters of tree-in-bud nodularity and mostly subcentimeter nodularity in both lungs that appears overall imprved from prior CTs of chest with no evidence of PE. She remains afebrile with some hypoxia and is currently on 2L O2 NC.  Initial las were WBC 8100 with 63% neutrophils, lactic acid 1.6, proBNP 132, creatinine 1.12, and ABG with pO2 91.3/pCO2 36.5. A respiratory panel PCR was negative. She is currently on Azithromycin and Unasyn.  ID was asked to evaluate and manage her antibiotic therapy.  Feels better today but still complains of significant cough and is worried about her persistent O2 requirement    Past Medical History:   Diagnosis Date    Cataract 2017    COPD (chronic obstructive pulmonary disease)  May2020    COVID-19 virus infection 12/30/2024    Deep vein thrombosis April2024    GERD (gastroesophageal reflux disease)     Hyperlipidemia     Hypertension     Migraines     Occipital neuralgia of left side     Pneumonia     PONV (postoperative nausea and vomiting)     Shingles     Stroke 2014    no residual effects    Umbilical hernia        Past Surgical History:   Procedure Laterality Date    BRONCHOSCOPY N/A 8/19/2024    Procedure: BRONCHOSCOPY WITH BRONCHOALVEOLAR LAVAGE;  Surgeon: Aria Irwin DO;  Location: [facility] ENDOSCOPY;  Service: Pulmonary;  Laterality: N/A;    CATARACT EXTRACTION Bilateral     COLONOSCOPY      FOOT SURGERY Left     x4    HYSTERECTOMY  2000    SUBTOTAL HYSTERECTOMY         Family History   Problem Relation Age of Onset    Heart disease Mother     Stroke Mother     Hypertension Mother     Heart disease Father     Hypertension Father     Hearing loss Father     Stroke Maternal Grandfather     No Known Problems Sister     Breast cancer Neg Hx     Ovarian cancer Neg Hx        Social History     Socioeconomic History    Marital status: Single   Tobacco Use    Smoking status: Former     Current packs/day: 0.00     Average packs/day: 1 pack/day for 40.0 years (40.0 ttl pk-yrs)     Types: Cigarettes     Start date: 8/4/1974     Quit date: 8/4/2014     Years since quitting: 10.8     Passive exposure: Past    Smokeless tobacco: Never   Vaping Use    Vaping status: Never Used   Substance and Sexual Activity    Alcohol use: Yes     Comment: occ.    Drug use: No    Sexual activity: Defer       Allergies   Allergen Reactions    Erythromycin GI Intolerance     Nausea      Amlodipine Other (See Comments)     Pt states she does not remember an allergy to this medicine, states her legs were swelling    Lisinopril Other (See Comments)     Joint pain    Sulfa Antibiotics Unknown (See Comments)     Pt's home pharmacy has sulfa drugs listed as an allergy.         Medication:    Current  Facility-Administered Medications:     acetaminophen (TYLENOL) tablet 650 mg, 650 mg, Oral, Q4H PRN **OR** acetaminophen (TYLENOL) 160 MG/5ML oral solution 650 mg, 650 mg, Oral, Q4H PRN **OR** acetaminophen (TYLENOL) suppository 650 mg, 650 mg, Rectal, Q4H PRN, Veronica Bejarano, APRN    ampicillin-sulbactam (UNASYN) 3,000 mg in sodium chloride 0.9 % 100 mL MBP, 3,000 mg, Intravenous, Q6H, Veronica Bejarano, APRN, 3,000 mg at 06/07/25 0444    apixaban (ELIQUIS) tablet 5 mg, 5 mg, Oral, BID, Veronica Bejarano, APRN, 5 mg at 06/07/25 0804    aspirin EC tablet 81 mg, 81 mg, Oral, Daily, Veronica Bejarano, APRN, 81 mg at 06/07/25 0804    atorvastatin (LIPITOR) tablet 40 mg, 40 mg, Oral, Daily, Veronica Bejarano, APRN, 40 mg at 06/07/25 0804    azithromycin (ZITHROMAX) 500 mg in sodium chloride 0.9 % 250 mL IVPB-VTB, 500 mg, Intravenous, Daily, Veronica Bejarano, APRN, 500 mg at 06/07/25 0838    sennosides-docusate (PERICOLACE) 8.6-50 MG per tablet 2 tablet, 2 tablet, Oral, BID PRN **AND** polyethylene glycol (MIRALAX) packet 17 g, 17 g, Oral, Daily PRN **AND** bisacodyl (DULCOLAX) EC tablet 5 mg, 5 mg, Oral, Daily PRN **AND** bisacodyl (DULCOLAX) suppository 10 mg, 10 mg, Rectal, Daily PRN, Veronica Bejarano, APRN    budesonide (PULMICORT) nebulizer solution 0.5 mg, 0.5 mg, Nebulization, BID - RT, Veronica Bejarano, APRN, 0.5 mg at 06/07/25 0754    chlorthalidone (HYGROTON) tablet 25 mg, 25 mg, Oral, Daily, Veronica Bejarano APRSARWAT    dextrose (D50W) (25 g/50 mL) IV injection 25 g, 25 g, Intravenous, Q15 Min PRN, Veronica Bejarano APRN    dextrose (GLUTOSE) oral gel 15 g, 15 g, Oral, Q15 Min PRN, Veronica Bejarano APRN    glucagon (GLUCAGEN) injection 1 mg, 1 mg, Intramuscular, Q15 Min PRN, Veronica Bejarano APRN    guaiFENesin-dextromethorphan (ROBITUSSIN DM) 100-10 MG/5ML syrup 10 mL, 10 mL, Oral, Q6H PRN, Veronica Bejarano APRN    Hydrocod Aly-Chlorphe Aly ER (TUSSIONEX PENNKINETIC) 10-8 MG/5ML ER suspension 5 mL, 5 mL, Oral, Q12H PRN, Veronica Bejarano  JAYNE MIRZA    Insulin Lispro (humaLOG) injection 2-7 Units, 2-7 Units, Subcutaneous, 4x Daily AC & at Bedtime, Veronica Bejarano APRN, 2 Units at 06/07/25 0803    ipratropium-albuterol (DUO-NEB) nebulizer solution 3 mL, 3 mL, Nebulization, 4x Daily PRN, Veronica Bejarano APRN    ipratropium-albuterol (DUO-NEB) nebulizer solution 3 mL, 3 mL, Nebulization, Q6H - RT, Veronica Bejarano APRN, 3 mL at 06/07/25 0754    losartan (COZAAR) tablet 100 mg, 100 mg, Oral, Daily, Veronica Bejarano APRN    melatonin tablet 5 mg, 5 mg, Oral, Nightly PRN, Veronica Bejarano APRN    nitroglycerin (NITROSTAT) SL tablet 0.4 mg, 0.4 mg, Sublingual, Q5 Min PRN, Veronica Bejarano APRN    ondansetron ODT (ZOFRAN-ODT) disintegrating tablet 4 mg, 4 mg, Oral, Q6H PRN **OR** ondansetron (ZOFRAN) injection 4 mg, 4 mg, Intravenous, Q6H PRN, Veronica Bejarano APRN    predniSONE (DELTASONE) tablet 40 mg, 40 mg, Oral, Daily With Breakfast, Veronica Bejarano APRN, 40 mg at 06/07/25 0708    sodium chloride 0.9 % flush 10 mL, 10 mL, Intravenous, PRN, Veronica Bejarano APRN    sodium chloride 0.9 % flush 10 mL, 10 mL, Intravenous, Q12H, Veronica Bejarano APRN    sodium chloride 0.9 % flush 10 mL, 10 mL, Intravenous, PRN, Veronica Bejarano APRN    sodium chloride 0.9 % infusion 40 mL, 40 mL, Intravenous, PRN, Veronica Bejarano APRN    Antibiotics:  Anti-Infectives (From admission, onward)      Ordered     Dose/Rate Route Frequency Start Stop    06/06/25 2034  azithromycin (ZITHROMAX) 500 mg in sodium chloride 0.9 % 250 mL IVPB-VTB        Ordering Provider: Vibbert, Veronica M, APRN    500 mg  over 60 Minutes Intravenous Daily 06/07/25 0900 06/10/25 0859    06/06/25 2034  ampicillin-sulbactam (UNASYN) 3,000 mg in sodium chloride 0.9 % 100 mL MBP        Ordering Provider: Veronica Bejarano APRN    3,000 mg  over 30 Minutes Intravenous Every 6 Hours 06/06/25 2200 06/11/25 8303            Review of Systems:  Constitutional-- No Fever, chills or sweats.  Appetite good, and no malaise. No  fatigue.  HEENT-- No new vision, hearing or throat complaints.  No epistaxis or oral sores.  Denies odynophagia or dysphagia. No headache, photophobia or neck stiffness.  CV-- No chest pain, palpitation or syncope  Resp-- + SOB/+ NP cough, + wheezing/no Hemoptysis  GI- No nausea, vomiting, or diarrhea.  No hematochezia, melena, or hematemesis. Denies jaundice or chronic liver disease.  -- No dysuria, hematuria, or flank pain.  Denies hesitancy, urgency or burning with urination.  Lymph- no swollen lymph nodes in neck/axilla or groin.   Heme- No active bruising or bleeding; + Hx of chronic LLE DVT/on Eliquis, no PE.  MS-- no swelling or pain in the bones or joints of arms/legs.  No new back pain.  Neuro-- No acute focal weakness or numbness in the arms or legs.  No seizures.  Skin--No rashes or lesions      Physical Exam:   Vital Signs  Temp (24hrs), Av °F (36.7 °C), Min:96.7 °F (35.9 °C), Max:98.4 °F (36.9 °C)    Temp  Min: 96.7 °F (35.9 °C)  Max: 98.4 °F (36.9 °C)  BP  Min: 113/83  Max: 172/69  Pulse  Min: 67  Max: 106  Resp  Min: 10  Max: 20  SpO2  Min: 83 %  Max: 100 %    GENERAL: Awake and alert, in no acute distress.   HEENT: Normocephalic, atraumatic.  PERRL. EOMI. No conjunctival injection. No icterus. Oropharynx clear without evidence of thrush or exudate.   NECK: Supple.  HEART: RRR; No murmur, rubs, gallops.   LUNGS: Diminished at bases but no wheezes or rales appreciated.  Dry cough.  Comfortable on 2 L of oxygen  ABDOMEN: Soft, nontender, nondistended. No rebound or guarding.  EXT:  No cyanosis  or edema.  :  Without Jimenes catheter.  MSK: No joint effusions or erythema  SKIN: Warm and dry without cutaneous eruptions on Inspection/palpation.    NEURO: Oriented to PPT.  Motor 5/5 strength  PSYCHIATRIC: anxious     Laboratory Data    Results from last 7 days   Lab Units 25  0405 25  1101   WBC 10*3/mm3 9.88 8.13   HEMOGLOBIN g/dL 10.3* 11.2*   HEMATOCRIT % 32.5* 35.4   PLATELETS 10*3/mm3  234 261     Results from last 7 days   Lab Units 06/07/25  0405   SODIUM mmol/L 141   POTASSIUM mmol/L 4.1   CHLORIDE mmol/L 103   CO2 mmol/L 22.0   BUN mg/dL 19.8   CREATININE mg/dL 1.07*   GLUCOSE mg/dL 192*   CALCIUM mg/dL 9.2     Results from last 7 days   Lab Units 06/06/25  1101   ALK PHOS U/L 82   BILIRUBIN mg/dL 0.5   ALT (SGPT) U/L 12   AST (SGOT) U/L 17             Results from last 7 days   Lab Units 06/06/25  1101   LACTATE mmol/L 1.6             Estimated Creatinine Clearance (by C-G formula based on SCr of 1.07 mg/dL (H))  Female: 52.7 mL/min (A)  Male: 64.5 mL/min (A)      Microbiology:  Microbiology Results (last 10 days)       Procedure Component Value - Date/Time    Respiratory Panel PCR w/COVID-19(SARS-CoV-2) UMAIR/NIRU/SOPHIA/PAD/COR/IBIS In-House, NP Swab in UTM/VTM, 2 HR TAT - Swab, Nasopharynx [[id number]]  (Normal) Collected: 06/06/25 1118    Lab Status: Final result Specimen: Swab from Nasopharynx Updated: 06/06/25 1212     ADENOVIRUS, PCR Not Detected     Coronavirus 229E Not Detected     Coronavirus HKU1 Not Detected     Coronavirus NL63 Not Detected     Coronavirus OC43 Not Detected     COVID19 Not Detected     Human Metapneumovirus Not Detected     Human Rhinovirus/Enterovirus Not Detected     Influenza A PCR Not Detected     Influenza B PCR Not Detected     Parainfluenza Virus 1 Not Detected     Parainfluenza Virus 2 Not Detected     Parainfluenza Virus 3 Not Detected     Parainfluenza Virus 4 Not Detected     RSV, PCR Not Detected     Bordetella pertussis pcr Not Detected     Bordetella parapertussis PCR Not Detected     Chlamydophila pneumoniae PCR Not Detected     Mycoplasma pneumo by PCR Not Detected    Narrative:      In the setting of a positive respiratory panel with a viral infection PLUS a negative procalcitonin without other underlying concern for bacterial infection, consider observing off antibiotics or discontinuation of antibiotics and continue supportive care. If the respiratory  panel is positive for atypical bacterial infection (Bordetella pertussis, Chlamydophila pneumoniae, or Mycoplasma pneumoniae), consider antibiotic de-escalation to target atypical bacterial infection.                  Radiology:  Imaging Results (Last 72 Hours)       Procedure Component Value Units Date/Time    CT Angiogram Chest Pulmonary Embolism [[id number]] Collected: 06/06/25 1522     Updated: 06/06/25 1540    Narrative:      CT ANGIOGRAM CHEST PULMONARY EMBOLISM    Date of Exam: 6/6/2025 2:58 PM EDT    Indication: Pulmonary Embolism.    Comparison: CT chest 3/29/2025    Technique: Axial CT images were obtained of the chest after the uneventful intravenous administration of 85 mL Isovue-370 utilizing pulmonary embolism protocol.  In addition, a 3-D volume rendered image was created for interpretation.  Reconstructed   coronal and sagittal images were also obtained. Automated exposure control and iterative construction methods were used.      Findings:  Normal appearance of the pulmonary arteries without evidence of pulmonary embolism. There is atherosclerosis of the descending thoracic aorta and aortic arch. No pericardial effusion. There are coronary artery calcifications. No thoracic adenopathy.   Chest wall soft tissues are unremarkable. The trachea and mainstem bronchi are patent. The smaller peripheral airways are unremarkable. Redemonstration of numerous centrilobular and subpleural clusters of tree-in-bud nodularity and mostly subcentimeter   nodularity in both lungs. This overall appears improved although some of the nodules appear slightly increased compared to prior, for example a triangular subpleural 10 mm nodule in the lateral right lower lobe (series 4 image 69), previously measuring   approximately 7 mm. Redemonstration of confluent subpleural nodularity at the posterior right lung base (series 4 image 90). No pleural effusion. No pneumothorax. No acute or suspicious bony findings. There is a loop  recorder device in the subcutaneous   tissues of the left anterior chest. Unremarkable appearance of the partially imaged upper abdomen.      Impression:      Impression:  No evidence of pulmonary embolism.      Redemonstration of numerous centrilobular and subpleural clusters of tree-in-bud nodularity and mostly subcentimeter nodularity in both lungs. This appears overall improved although some of the nodules appear slightly increased compared to prior. This   appearance is most compatible with chronic infectious or inflammatory process.        Electronically Signed: August King MD    6/6/2025 3:37 PM EDT    Workstation ID: [id number]    XR Chest 1 View [[id number]] Collected: 06/06/25 1158     Updated: 06/06/25 1203    Narrative:      XR CHEST 1 VW    Date of Exam: 6/6/2025 11:16 AM EDT    Indication: SOA triage protocol    Comparison: 3/19/2025    Findings:  Heart size is at the upper limits of normal. Pulmonary vessels are normal. There is improving airspace disease within the right lung. Left lung is clear. There are no pleural effusions. No pneumothorax.      Impression:      Impression:    1. Improving nodular right-sided airspace disease. No new or worsening airspace consolidation. No pleural effusion.      Electronically Signed: Golden Camargo MD    6/6/2025 12:00 PM EDT    Workstation ID: [id number]          I personally reviewed the above CT chest: Bilateral nodules with tree-in-bud appearance.  No dense parenchymal infiltrate      Impression:   Chronic obstructive pulmonary disease with acute exacerbation, bronchitis: No evidence of pneumonia on CT.  Her acute symptoms are not related to MAC.  Could have been triggered by Arikayce. Or other possibly viral illness, but multiplex respiratory PCR was negative. 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 Respiratory panel PCR was negative and CT scan showed some improvement in the lung nodularity but would not expect improvement to be related to MAC treatment at this point.  Acute hypoxic respiratory insufficiency: Acute problem with threat to life and bodily function.  On 2L O2.  No requirement at baseline  Hoarseness/diminished voice, likely from neb liposomal Amikacin, improved  Chronic DVT/on Eliquis.  Unable to take Rifampin for DESMOND as interacts with Eliquis.  Prediabetes  Sulfa listed allergy    PLAN/RECOMMENDATIONS:   Hold all MAC medication in the acute setting (azithromycin, ethambutol and Arikayce).      Treatment of bronchitis/COPD exacerbation with empiric doxycycline.  Steroids, nebulizers and other supportive care per IM team.  Wean O2 as tolerated.     Okay to discharge from my perspective when she feels well enough to go, potentially as soon as tomorrow.  If she is discharged over the weekend she has outpatient follow-up scheduled with Dr. Adrian Segovia on Tuesday.  If she is still in the hospital on Monday Dr. Segovia will reevaluate at that time    Rayo Manzo MD saw and examined patient, verified hx and PE, read all radiographic studies, reviewed labs and micro data, and formulated dx, plan for treatment and all medical decision making.      Gaetano Parra PA-C for Rayo Manzo MD    This visit included the following complex service elements:  Complex medical decision-making associated with antimicrobial prescribing.  In-depth chart review with high level synthesis for complex diagnoses.  Managed infection treatment protocol associated with transitions of care for this complex patient.      MARCO Rosen  6/7/2025  09:36 EDT    I have edited this note to reflect my history, exam, assessment and plan. I alone have made the medical decision making for this complex patient.   Rayo Manzo MD

## 2025-06-07 NOTE — PLAN OF CARE
Goal Outcome Evaluation:  Plan of Care Reviewed With: patient        Progress: improving            Problem: Adult Inpatient Plan of Care  Goal: Absence of Hospital-Acquired Illness or Injury  Intervention: Identify and Manage Fall Risk  Description: Perform standard risk assessment on admission using a validated tool or comprehensive approach appropriate to the patient; reassess fall risk frequently, with change in status or transfer to another level of care.Communicate risk to interprofessional healthcare team; ensure fall risk visible cue.Determine need for increased observation, equipment and environmental modification, as well as use of supportive, nonskid footwear.Adjust safety measures to individual needs and identified risk factors.Reinforce the importance of active participation with fall risk prevention, safety, and physical activity with the patient and family.Perform regular intentional rounding to assess need for position change, pain assessment and personal needs, including assistance with toileting.  Recent Flowsheet Documentation  Taken 6/6/2025 2000 by Velma Francis RN  Safety Promotion/Fall Prevention: activity supervised  Intervention: Prevent Skin Injury  Description: Perform a screening for skin injury risk, such as pressure or moisture-associated skin damage on admission and at regular intervals throughout hospital stay.Keep all areas of skin (especially folds) clean and dry.Maintain adequate skin hydration.Relieve and redistribute pressure and protect bony prominences and skin at risk for injury; implement measures based on patient-specific risk factors.Match turning and repositioning schedule to clinical condition.Encourage weight shift frequently; assist with reposition if unable to complete independently.Float heels off bed; avoid pressure on the Achilles tendon.Keep skin free from extended contact with medical devices.Optimize nutrition and hydration.Encourage functional activity and  mobility, as early as tolerated.Use aids (e.g., slide boards, mechanical lift) during transfer.  Recent Flowsheet Documentation  Taken 6/6/2025 2000 by Velma Francis RN  Body Position: position changed independently

## 2025-06-08 LAB
GLUCOSE BLDC GLUCOMTR-MCNC: 106 MG/DL (ref 70–130)
GLUCOSE BLDC GLUCOMTR-MCNC: 121 MG/DL (ref 70–130)

## 2025-06-08 PROCEDURE — 63710000001 PREDNISONE PER 1 MG: Performed by: NURSE PRACTITIONER

## 2025-06-08 PROCEDURE — 94799 UNLISTED PULMONARY SVC/PX: CPT

## 2025-06-08 PROCEDURE — G0378 HOSPITAL OBSERVATION PER HR: HCPCS

## 2025-06-08 PROCEDURE — 99239 HOSP IP/OBS DSCHRG MGMT >30: CPT | Performed by: INTERNAL MEDICINE

## 2025-06-08 PROCEDURE — 82948 REAGENT STRIP/BLOOD GLUCOSE: CPT

## 2025-06-08 PROCEDURE — 94761 N-INVAS EAR/PLS OXIMETRY MLT: CPT

## 2025-06-08 RX ORDER — DOXYCYCLINE 100 MG/1
100 CAPSULE ORAL EVERY 12 HOURS SCHEDULED
Qty: 11 CAPSULE | Refills: 0 | Status: SHIPPED | OUTPATIENT
Start: 2025-06-08 | End: 2025-06-14

## 2025-06-08 RX ORDER — PREDNISONE 10 MG/1
10 TABLET ORAL TAKE AS DIRECTED
Qty: 20 TABLET | Refills: 0 | Status: SHIPPED | OUTPATIENT
Start: 2025-06-08

## 2025-06-08 RX ORDER — ALBUTEROL SULFATE 90 UG/1
2 INHALANT RESPIRATORY (INHALATION) EVERY 4 HOURS PRN
Qty: 8.5 G | Refills: 0 | Status: SHIPPED | OUTPATIENT
Start: 2025-06-08

## 2025-06-08 RX ADMIN — Medication 10 ML: at 20:21

## 2025-06-08 RX ADMIN — BUDESONIDE 0.5 MG: 0.5 SUSPENSION RESPIRATORY (INHALATION) at 07:44

## 2025-06-08 RX ADMIN — LOSARTAN POTASSIUM 100 MG: 50 TABLET, FILM COATED ORAL at 08:39

## 2025-06-08 RX ADMIN — HYDROCODONE POLISTIREX AND CHLORPHENIRAMINE POLISTIREX 5 ML: 10; 8 SUSPENSION, EXTENDED RELEASE ORAL at 20:20

## 2025-06-08 RX ADMIN — BUDESONIDE 0.5 MG: 0.5 SUSPENSION RESPIRATORY (INHALATION) at 20:02

## 2025-06-08 RX ADMIN — APIXABAN 5 MG: 5 TABLET, FILM COATED ORAL at 08:39

## 2025-06-08 RX ADMIN — ASPIRIN 81 MG: 81 TABLET, COATED ORAL at 08:39

## 2025-06-08 RX ADMIN — ATORVASTATIN CALCIUM 40 MG: 40 TABLET, FILM COATED ORAL at 08:39

## 2025-06-08 RX ADMIN — CHLORTHALIDONE 25 MG: 25 TABLET ORAL at 08:39

## 2025-06-08 RX ADMIN — IPRATROPIUM BROMIDE AND ALBUTEROL SULFATE 3 ML: 2.5; .5 SOLUTION RESPIRATORY (INHALATION) at 07:43

## 2025-06-08 RX ADMIN — APIXABAN 5 MG: 5 TABLET, FILM COATED ORAL at 20:20

## 2025-06-08 RX ADMIN — IPRATROPIUM BROMIDE AND ALBUTEROL SULFATE 3 ML: 2.5; .5 SOLUTION RESPIRATORY (INHALATION) at 20:02

## 2025-06-08 RX ADMIN — Medication 10 ML: at 08:40

## 2025-06-08 RX ADMIN — PREDNISONE 40 MG: 20 TABLET ORAL at 08:39

## 2025-06-08 RX ADMIN — DOXYCYCLINE 100 MG: 100 CAPSULE ORAL at 08:39

## 2025-06-08 RX ADMIN — IPRATROPIUM BROMIDE AND ALBUTEROL SULFATE 3 ML: 2.5; .5 SOLUTION RESPIRATORY (INHALATION) at 00:33

## 2025-06-08 RX ADMIN — DOXYCYCLINE 100 MG: 100 CAPSULE ORAL at 20:20

## 2025-06-08 NOTE — DISCHARGE SUMMARY
[facility] Medicine Services  DISCHARGE SUMMARY    Patient Name: Agustina Reyes  : 1955  MRN: [id number]    Date of Admission: 2025 10:54 AM  Date of Discharge:  25  Primary Care Physician: Rich Zaragoza MD    Consults       Date and Time Order Name Status Description    2025  8:34 PM Inpatient Infectious Diseases Consult Completed             Hospital Course     Presenting Problem: shortness of breath    Active Hospital Problems    Diagnosis  POA    **Dyspnea [R06.00]  Yes    COPD exacerbation [J44.1]  Yes    Medication reaction [T50.905A]  Unknown    Chronic deep vein thrombosis (DVT) [I82.509]  Unknown    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          Hospital Course:  Agustina Reyes is a 69 y.o. adult with history of COPD, GERD, hypertension, hyperlipidemia, CVA, migraines, chronic DVT on Eliquis and MAC infection who presented with shortness of breath.  Patient recently started on Arikacye approximately 3 weeks ago for her MAC.  She initially had some hoarseness with this medication which improved, but over the 3 days prior to admission she developed shortness of breath and chest tightness.     Note: Patient not discharged on 25 due to hypoxia and information in this discharge summary significantly updated (medication and treatment plan changes) -- please see discharge summary for 2025 for new discharge plan.     COPD with acute exacerbation  MAC  - Respiratory PCR panel negative  - CT chest shows tree-in-bud nodularity -- somewhat improved compared to prior scan from 3/29/2025  - Bronchospasm improved with IV/PO steroids, inhaled budesonide and DuoNebs  - Arikayce, Azithromycin and Ethambutol held, with  empiric doxycycline provided for her COPD exacerbation  - patient clinically improved, will walk test to assess oxygen level prior to discharge home  - home with prednisone taper, doxycycline 100 mg PO BID to complete 7 days  - patient to resume home Breztri.  Rx provided for albuterol refill  - ID followed while hospitalized, patient to followup with ID Dr Adrian Segovia on Tuesday 6/10 as previously scheduled  - follow up with PCP in one week.     HTN     HLD     Chronic DVT  - eliquis    Prediabetes           Discharge Follow Up Recommendations for outpatient labs/diagnostics:       Day of Discharge     HPI:   Breathing feels better.  No cough.  Shortness of breath improved.  Able to walk to the bathroom without difficulty.  Would like to go home today    Review of Systems  Gen: no fever    Vital Signs:   Temp:  [97.7 °F (36.5 °C)-97.9 °F (36.6 °C)] 97.7 °F (36.5 °C)  Heart Rate:  [] 77  Resp:  [16-18] 16  BP: (122-155)/(48-87) 122/57  Flow (L/min) (Oxygen Therapy):  [1-2] 1      Physical Exam:  Non toxic, in bed  MM moist  RRR  CTAB  Abd soft, NT  Alert, speech clear  Normal affect    Pertinent  and/or Most Recent Results     LAB RESULTS:      Lab 06/07/25  0405 06/06/25  1101   WBC 9.88 8.13   HEMOGLOBIN 10.3* 11.2*   HEMATOCRIT 32.5* 35.4   PLATELETS 234 261   NEUTROS ABS 8.69* 5.12   IMMATURE GRANS (ABS) 0.11* 0.01   LYMPHS ABS 0.84 1.61   MONOS ABS 0.19 0.57   EOS ABS 0.02 0.74*   MCV 83.5 84.1   LACTATE  --  1.6         Lab 06/07/25  0405 06/06/25  1101   SODIUM 141 141   POTASSIUM 4.1 3.9   CHLORIDE 103 104   CO2 22.0 24.1   ANION GAP 16.0* 12.9   BUN 19.8 18.6   CREATININE 1.07* 1.12*   EGFR 56.3* 53.3*   GLUCOSE 192* 102*   CALCIUM 9.2 9.6         Lab 06/06/25  1101   TOTAL PROTEIN 7.3   ALBUMIN 4.4   GLOBULIN 2.9   ALT (SGPT) 12   AST (SGOT) 17   BILIRUBIN 0.5   ALK PHOS 82         Lab 06/06/25  1202 06/06/25  1101   PROBNP  --  132.0   HSTROP T 13 16*                 Lab 06/06/25  1333   PH,  ARTERIAL 7.417   PCO2, ARTERIAL 36.5   PO2 ART 91.3   FIO2 28   HCO3 ART 23.5   BASE EXCESS ART -0.8*   CARBOXYHEMOGLOBIN 0.9     Brief Urine Lab Results  (Last result in the past 365 days)        Color   Clarity   Blood   Leuk Est   Nitrite   Protein   CREAT   Urine HCG        09/26/24 1400 Yellow   Clear   Negative   Large (3+)   Negative   Negative                 Microbiology Results (last 10 days)       Procedure Component Value - Date/Time    Respiratory Panel PCR w/COVID-19(SARS-CoV-2) UMAIR/NIRU/SOPIHA/PAD/COR/IBIS In-House, NP Swab in UTM/VTM, 2 HR TAT - Swab, Nasopharynx [[id number]]  (Normal) Collected: 06/06/25 1118    Lab Status: Final result Specimen: Swab from Nasopharynx Updated: 06/06/25 1212     ADENOVIRUS, PCR Not Detected     Coronavirus 229E Not Detected     Coronavirus HKU1 Not Detected     Coronavirus NL63 Not Detected     Coronavirus OC43 Not Detected     COVID19 Not Detected     Human Metapneumovirus Not Detected     Human Rhinovirus/Enterovirus Not Detected     Influenza A PCR Not Detected     Influenza B PCR Not Detected     Parainfluenza Virus 1 Not Detected     Parainfluenza Virus 2 Not Detected     Parainfluenza Virus 3 Not Detected     Parainfluenza Virus 4 Not Detected     RSV, PCR Not Detected     Bordetella pertussis pcr Not Detected     Bordetella parapertussis PCR Not Detected     Chlamydophila pneumoniae PCR Not Detected     Mycoplasma pneumo by PCR Not Detected    Narrative:      In the setting of a positive respiratory panel with a viral infection PLUS a negative procalcitonin without other underlying concern for bacterial infection, consider observing off antibiotics or discontinuation of antibiotics and continue supportive care. If the respiratory panel is positive for atypical bacterial infection (Bordetella pertussis, Chlamydophila pneumoniae, or Mycoplasma pneumoniae), consider antibiotic de-escalation to target atypical bacterial infection.            CT Angiogram Chest Pulmonary  Embolism  Result Date: 6/6/2025  CT ANGIOGRAM CHEST PULMONARY EMBOLISM Date of Exam: 6/6/2025 2:58 PM EDT Indication: Pulmonary Embolism. Comparison: CT chest 3/29/2025 Technique: Axial CT images were obtained of the chest after the uneventful intravenous administration of 85 mL Isovue-370 utilizing pulmonary embolism protocol.  In addition, a 3-D volume rendered image was created for interpretation.  Reconstructed coronal and sagittal images were also obtained. Automated exposure control and iterative construction methods were used. Findings: Normal appearance of the pulmonary arteries without evidence of pulmonary embolism. There is atherosclerosis of the descending thoracic aorta and aortic arch. No pericardial effusion. There are coronary artery calcifications. No thoracic adenopathy. Chest wall soft tissues are unremarkable. The trachea and mainstem bronchi are patent. The smaller peripheral airways are unremarkable. Redemonstration of numerous centrilobular and subpleural clusters of tree-in-bud nodularity and mostly subcentimeter nodularity in both lungs. This overall appears improved although some of the nodules appear slightly increased compared to prior, for example a triangular subpleural 10 mm nodule in the lateral right lower lobe (series 4 image 69), previously measuring approximately 7 mm. Redemonstration of confluent subpleural nodularity at the posterior right lung base (series 4 image 90). No pleural effusion. No pneumothorax. No acute or suspicious bony findings. There is a loop recorder device in the subcutaneous tissues of the left anterior chest. Unremarkable appearance of the partially imaged upper abdomen.     Impression: No evidence of pulmonary embolism. Redemonstration of numerous centrilobular and subpleural clusters of tree-in-bud nodularity and mostly subcentimeter nodularity in both lungs. This appears overall improved although some of the nodules appear slightly increased compared to  prior. This appearance is most compatible with chronic infectious or inflammatory process. Electronically Signed: August King MD  6/6/2025 3:37 PM EDT  Workstation ID: [id number]    XR Chest 1 View  Result Date: 6/6/2025  XR CHEST 1 VW Date of Exam: 6/6/2025 11:16 AM EDT Indication: SOA triage protocol Comparison: 3/19/2025 Findings: Heart size is at the upper limits of normal. Pulmonary vessels are normal. There is improving airspace disease within the right lung. Left lung is clear. There are no pleural effusions. No pneumothorax.     Impression: 1. Improving nodular right-sided airspace disease. No new or worsening airspace consolidation. No pleural effusion. Electronically Signed: Golden Camargo MD  6/6/2025 12:00 PM EDT  Workstation ID: [id number]      Results for orders placed during the hospital encounter of 03/25/25    Duplex Venous Lower Extremity - Right CAR    Interpretation Summary    Chronic right lower extremity deep vein thrombosis noted in the proximal femoral and popliteal.      Results for orders placed during the hospital encounter of 03/25/25    Duplex Venous Lower Extremity - Right CAR    Interpretation Summary    Chronic right lower extremity deep vein thrombosis noted in the proximal femoral and popliteal.          Plan for Follow-up of Pending Labs/Results:     Discharge Details        Discharge Medications        New Medications        Instructions Start Date   doxycycline 100 MG capsule  Commonly known as: MONODOX   100 mg, Oral, Every 12 Hours Scheduled      predniSONE 10 MG tablet  Commonly known as: DELTASONE   10 mg, Oral, Take As Directed, Take 4 tabs daily for two days, then take 3 tablets daily for two days, then take two tablets daily for two days, then take 1 tablet daily for two days, then stop.             Changes to Medications        Instructions Start Date   albuterol sulfate  (90 Base) MCG/ACT inhaler  Commonly known as: PROVENTIL HFA;VENTOLIN HFA;PROAIR HFA  What  changed:   how to take this  reasons to take this   2 puffs, Inhalation, Every 4 Hours PRN             Continue These Medications        Instructions Start Date   aspirin 81 MG tablet   81 mg, Daily      atorvastatin 40 MG tablet  Commonly known as: LIPITOR   40 mg, Oral, Daily      Breztri Aerosphere 160-9-4.8 MCG/ACT aerosol inhaler  Generic drug: Budeson-Glycopyrrol-Formoterol   2 puffs, Inhalation, 2 Times Daily      butalbital-acetaminophen  MG tablet tablet   1-2 tablets, Oral, Every 6 Hours PRN      chlorthalidone 25 MG tablet  Commonly known as: HYGROTON   25 mg, Oral, Daily      Eliquis 5 MG tablet tablet  Generic drug: apixaban   5 mg, Oral, 2 Times Daily      Hydrocod Aly-Chlorphe Aly ER 10-8 MG/5ML ER suspension  Commonly known as: TUSSIONEX PENNKINETIC   TAKE 5ML BY MOUTH EVERY 12 HOURS AS NEEDED FOR COUGH      ipratropium-albuterol 0.5-2.5 mg/3 ml nebulizer  Commonly known as: DUO-NEB   3 mL, Nebulization, 4 Times Daily PRN      losartan 100 MG tablet  Commonly known as: COZAAR   100 mg, Oral, Daily      pioglitazone 15 MG tablet  Commonly known as: Actos   15 mg, Oral, Daily      prochlorperazine 5 MG tablet  Commonly known as: COMPAZINE   5-10 mg, Oral, Every 6 Hours PRN             Stop These Medications      Arikayce 590 MG/8.4ML suspension  Generic drug: Amikacin Sulfate Liposome     azithromycin 500 MG tablet  Commonly known as: ZITHROMAX     ethambutol 400 MG tablet  Commonly known as: MYAMBUTOL              Allergies   Allergen Reactions    Erythromycin GI Intolerance     Nausea      Amlodipine Other (See Comments)     Pt states she does not remember an allergy to this medicine, states her legs were swelling    Lisinopril Other (See Comments)     Joint pain    Sulfa Antibiotics Unknown (See Comments)     Pt's home pharmacy has sulfa drugs listed as an allergy.         Discharge Disposition:  Home or Self Care    Diet:  Hospital:  Diet Order   Procedures    Diet: Cardiac, Diabetic;  Healthy Heart (2-3 Na+); Consistent Carbohydrate; Fluid Consistency: Thin (IDDSI 0)            Activity:      Restrictions or Other Recommendations:         CODE STATUS:    Code Status and Medical Interventions: CPR (Attempt to Resuscitate); Full Support   Ordered at: 06/06/25 2034     Code Status (Patient has no pulse and is not breathing):    CPR (Attempt to Resuscitate)     Medical Interventions (Patient has pulse or is breathing):    Full Support       Future Appointments   Date Time Provider Department Center   6/19/2025  3:30 PM Shawn Wilkins MD [facility]   8/21/2025 10:00 AM Mago Bowden APRN [facility]   2/16/2026 10:45 AM Rich Zaragoza MD [facility]   4/22/2026 11:45 AM Alex Josue MD [facility]       Additional Instructions for the Follow-ups that You Need to Schedule       Discharge Follow-up with PCP   As directed       Currently Documented PCP:    Rich Zaragoza MD    PCP Phone Number:    [phone]     Follow Up Details: follow up with PCP in one week        Discharge Follow-up with Specialty: follow up with Infectious Disease Dr Luca Rahman 6/10 as scheduled   As directed      Specialty: follow up with Infectious Disease Dr Luca Rahman 6/10 as scheduled                      Zheng Stevenson MD  06/08/25      Time Spent on Discharge:  I spent  35  minutes on this discharge activity which included: face-to-face encounter with the patient, reviewing the data in the system, coordination of the care with the nursing staff as well as consultants, documentation, and entering orders.

## 2025-06-08 NOTE — PLAN OF CARE
Problem: Adult Inpatient Plan of Care  Goal: Plan of Care Review  Outcome: Progressing  Goal: Patient-Specific Goal (Individualized)  Outcome: Progressing  Goal: Absence of Hospital-Acquired Illness or Injury  Outcome: Progressing  Intervention: Identify and Manage Fall Risk  Recent Flowsheet Documentation  Taken 6/8/2025 0000 by Noah Booker RN  Safety Promotion/Fall Prevention:   assistive device/personal items within reach   clutter free environment maintained   fall prevention program maintained   lighting adjusted   nonskid shoes/slippers when out of bed   room organization consistent   safety round/check completed   toileting scheduled  Taken 6/7/2025 1945 by Noah Booker RN  Safety Promotion/Fall Prevention:   assistive device/personal items within reach   clutter free environment maintained   fall prevention program maintained   lighting adjusted   nonskid shoes/slippers when out of bed   room organization consistent   safety round/check completed   toileting scheduled  Intervention: Prevent Skin Injury  Recent Flowsheet Documentation  Taken 6/8/2025 0000 by Noah Booker RN  Body Position:   position changed independently   left  Taken 6/7/2025 1945 by Noah Booker RN  Body Position:   position changed independently   sitting up in bed  Skin Protection: incontinence pads utilized  Intervention: Prevent and Manage VTE (Venous Thromboembolism) Risk  Recent Flowsheet Documentation  Taken 6/7/2025 1945 by Noah Booker RN  VTE Prevention/Management: (freq ambulation, also Eliquis)   bilateral   SCDs (sequential compression devices) off   patient refused intervention   other (see comments)  Intervention: Prevent Infection  Recent Flowsheet Documentation  Taken 6/8/2025 0000 by Noah Booker RN  Infection Prevention:   environmental surveillance performed   equipment surfaces disinfected   hand hygiene promoted   rest/sleep promoted   single patient  room provided  Taken 6/7/2025 1945 by Noah Booker RN  Infection Prevention:   environmental surveillance performed   equipment surfaces disinfected   hand hygiene promoted   rest/sleep promoted   single patient room provided  Goal: Optimal Comfort and Wellbeing  Outcome: Progressing  Intervention: Monitor Pain and Promote Comfort  Recent Flowsheet Documentation  Taken 6/7/2025 1945 by Noah Booker RN  Pain Management Interventions:   care clustered   medication offered but refused   pain management plan reviewed with patient/caregiver   pillow support provided   position adjusted   quiet environment facilitated   relaxation techniques promoted  Intervention: Provide Person-Centered Care  Recent Flowsheet Documentation  Taken 6/7/2025 1945 by Noah Booker RN  Trust Relationship/Rapport:   care explained   choices provided   emotional support provided   empathic listening provided   questions answered   questions encouraged   reassurance provided   thoughts/feelings acknowledged  Goal: Readiness for Transition of Care  Outcome: Progressing     Problem: Pain Acute  Goal: Optimal Pain Control and Function  Outcome: Progressing  Intervention: Optimize Psychosocial Wellbeing  Recent Flowsheet Documentation  Taken 6/7/2025 1945 by Noah Booker RN  Supportive Measures: active listening utilized  Diversional Activities:   television   smartphone  Intervention: Develop Pain Management Plan  Recent Flowsheet Documentation  Taken 6/7/2025 1945 by Noah Booker RN  Pain Management Interventions:   care clustered   medication offered but refused   pain management plan reviewed with patient/caregiver   pillow support provided   position adjusted   quiet environment facilitated   relaxation techniques promoted  Intervention: Prevent or Manage Pain  Recent Flowsheet Documentation  Taken 6/8/2025 0000 by Noah Booker RN  Medication Review/Management: medications  reviewed  Taken 6/7/2025 1945 by Noah Booker, RN  Sensory Stimulation Regulation:   auditory stimulation minimized   care clustered   lighting decreased   quiet environment promoted  Bowel Elimination Promotion:   adequate fluid intake promoted   ambulation promoted   privacy promoted  Sleep/Rest Enhancement: awakenings minimized  Medication Review/Management: medications reviewed   Goal Outcome Evaluation:

## 2025-06-09 ENCOUNTER — READMISSION MANAGEMENT (OUTPATIENT)
Dept: CALL CENTER | Facility: HOSPITAL | Age: 70
End: 2025-06-09
Payer: COMMERCIAL

## 2025-06-09 VITALS
HEIGHT: 66 IN | DIASTOLIC BLOOD PRESSURE: 59 MMHG | WEIGHT: 175 LBS | RESPIRATION RATE: 16 BRPM | TEMPERATURE: 98 F | OXYGEN SATURATION: 93 % | SYSTOLIC BLOOD PRESSURE: 158 MMHG | HEART RATE: 78 BPM | BODY MASS INDEX: 28.12 KG/M2

## 2025-06-09 LAB
D DIMER PPP FEU-MCNC: <0.27 MCGFEU/ML (ref 0–0.69)
GLUCOSE BLDC GLUCOMTR-MCNC: 114 MG/DL (ref 70–130)
GLUCOSE BLDC GLUCOMTR-MCNC: 126 MG/DL (ref 70–130)
GLUCOSE BLDC GLUCOMTR-MCNC: 132 MG/DL (ref 70–130)
GLUCOSE BLDC GLUCOMTR-MCNC: 94 MG/DL (ref 70–130)

## 2025-06-09 PROCEDURE — 85613 RUSSELL VIPER VENOM DILUTED: CPT | Performed by: INTERNAL MEDICINE

## 2025-06-09 PROCEDURE — 85300 ANTITHROMBIN III ACTIVITY: CPT | Performed by: INTERNAL MEDICINE

## 2025-06-09 PROCEDURE — 85705 THROMBOPLASTIN INHIBITION: CPT | Performed by: INTERNAL MEDICINE

## 2025-06-09 PROCEDURE — 94799 UNLISTED PULMONARY SVC/PX: CPT

## 2025-06-09 PROCEDURE — 96372 THER/PROPH/DIAG INJ SC/IM: CPT

## 2025-06-09 PROCEDURE — 85379 FIBRIN DEGRADATION QUANT: CPT | Performed by: INTERNAL MEDICINE

## 2025-06-09 PROCEDURE — 81241 F5 GENE: CPT | Performed by: INTERNAL MEDICINE

## 2025-06-09 PROCEDURE — 85306 CLOT INHIBIT PROT S FREE: CPT | Performed by: INTERNAL MEDICINE

## 2025-06-09 PROCEDURE — 85670 THROMBIN TIME PLASMA: CPT | Performed by: INTERNAL MEDICINE

## 2025-06-09 PROCEDURE — 85305 CLOT INHIBIT PROT S TOTAL: CPT | Performed by: INTERNAL MEDICINE

## 2025-06-09 PROCEDURE — 25010000002 ENOXAPARIN PER 10 MG: Performed by: INTERNAL MEDICINE

## 2025-06-09 PROCEDURE — 85302 CLOT INHIBIT PROT C ANTIGEN: CPT | Performed by: INTERNAL MEDICINE

## 2025-06-09 PROCEDURE — G0378 HOSPITAL OBSERVATION PER HR: HCPCS

## 2025-06-09 PROCEDURE — 85303 CLOT INHIBIT PROT C ACTIVITY: CPT | Performed by: INTERNAL MEDICINE

## 2025-06-09 PROCEDURE — 86147 CARDIOLIPIN ANTIBODY EA IG: CPT | Performed by: INTERNAL MEDICINE

## 2025-06-09 PROCEDURE — 85732 THROMBOPLASTIN TIME PARTIAL: CPT | Performed by: INTERNAL MEDICINE

## 2025-06-09 PROCEDURE — 81240 F2 GENE: CPT | Performed by: INTERNAL MEDICINE

## 2025-06-09 PROCEDURE — 63710000001 PREDNISONE PER 1 MG: Performed by: NURSE PRACTITIONER

## 2025-06-09 PROCEDURE — 86146 BETA-2 GLYCOPROTEIN ANTIBODY: CPT | Performed by: INTERNAL MEDICINE

## 2025-06-09 PROCEDURE — 82948 REAGENT STRIP/BLOOD GLUCOSE: CPT

## 2025-06-09 PROCEDURE — 94761 N-INVAS EAR/PLS OXIMETRY MLT: CPT

## 2025-06-09 PROCEDURE — 94664 DEMO&/EVAL PT USE INHALER: CPT

## 2025-06-09 RX ORDER — ENOXAPARIN SODIUM 100 MG/ML
1 INJECTION SUBCUTANEOUS EVERY 12 HOURS
Status: DISCONTINUED | OUTPATIENT
Start: 2025-06-09 | End: 2025-06-09 | Stop reason: HOSPADM

## 2025-06-09 RX ORDER — ENOXAPARIN SODIUM 100 MG/ML
1 INJECTION SUBCUTANEOUS EVERY 12 HOURS
Start: 2025-06-09

## 2025-06-09 RX ADMIN — IPRATROPIUM BROMIDE AND ALBUTEROL SULFATE 3 ML: 2.5; .5 SOLUTION RESPIRATORY (INHALATION) at 09:07

## 2025-06-09 RX ADMIN — BUDESONIDE 0.5 MG: 0.5 SUSPENSION RESPIRATORY (INHALATION) at 09:07

## 2025-06-09 RX ADMIN — DOXYCYCLINE 100 MG: 100 CAPSULE ORAL at 08:03

## 2025-06-09 RX ADMIN — APIXABAN 5 MG: 5 TABLET, FILM COATED ORAL at 08:03

## 2025-06-09 RX ADMIN — ASPIRIN 81 MG: 81 TABLET, COATED ORAL at 08:03

## 2025-06-09 RX ADMIN — LOSARTAN POTASSIUM 100 MG: 50 TABLET, FILM COATED ORAL at 08:03

## 2025-06-09 RX ADMIN — PREDNISONE 40 MG: 20 TABLET ORAL at 08:03

## 2025-06-09 RX ADMIN — DOXYCYCLINE 100 MG: 100 CAPSULE ORAL at 20:24

## 2025-06-09 RX ADMIN — IPRATROPIUM BROMIDE AND ALBUTEROL SULFATE 3 ML: 2.5; .5 SOLUTION RESPIRATORY (INHALATION) at 13:23

## 2025-06-09 RX ADMIN — ATORVASTATIN CALCIUM 40 MG: 40 TABLET, FILM COATED ORAL at 08:03

## 2025-06-09 RX ADMIN — IPRATROPIUM BROMIDE AND ALBUTEROL SULFATE 3 ML: 2.5; .5 SOLUTION RESPIRATORY (INHALATION) at 00:07

## 2025-06-09 RX ADMIN — CHLORTHALIDONE 25 MG: 25 TABLET ORAL at 08:03

## 2025-06-09 RX ADMIN — ENOXAPARIN SODIUM 80 MG: 80 INJECTION SUBCUTANEOUS at 20:24

## 2025-06-09 NOTE — DISCHARGE SUMMARY
[facility] Services  DISCHARGE SUMMARY    Patient Name: Agustina Reyes  : 1955  MRN: [id number]    Date of Admission: 2025 10:54 AM  Date of Discharge:  25  Primary Care Physician: Rich Zaragoza MD    Consults       Date and Time Order Name Status Description    2025  8:34 PM Inpatient Infectious Diseases Consult Completed             Hospital Course     Presenting Problem: shortness of breath    Active Hospital Problems    Diagnosis  POA    **Dyspnea [R06.00]  Yes    COPD exacerbation [J44.1]  Yes    Medication reaction [T50.905A]  Unknown    Chronic deep vein thrombosis (DVT) [I82.509]  Unknown    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          Hospital Course:  Expand All Collapse All         [facility] Services  DISCHARGE SUMMARY     Patient Name: Agustina Reyes  : 1955  MRN: [id number]     Date of Admission: 2025 10:54 AM  Date of Discharge:  25  Primary Care Physician: Rich Zaragoza MD     Consults         Date and Time Order Name Status Description     2025  8:34 PM Inpatient Infectious Diseases Consult Completed                  Hospital Course      Presenting Problem: shortness of breath           Active Hospital Problems     Diagnosis   POA    **Dyspnea [R06.00]   Yes    COPD exacerbation [J44.1]   Yes    Medication reaction [T50.905A]   Unknown    Chronic deep vein thrombosis (DVT) [I82.509]   Unknown    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            Hospital Course:  Agustina Reyes is a 69 y.o. adult with history of COPD, GERD, hypertension, hyperlipidemia, CVA, migraines, chronic DVT on Eliquis and MAC infection who presented with shortness of breath.  Patient recently started on Arikacye for treatment of her MAC.  She initially had some hoarseness with this medication which improved, but over the 3 days prior to admission she developed shortness of breath and chest tightness.      COPD with acute exacerbation  MAC  - Respiratory PCR panel negative  - CT chest shows tree-in-bud nodularity -- somewhat improved compared to prior scan from 3/29/2025  - Bronchospasm improved with IV/PO steroids, inhaled budesonide and DuoNebs  - Arikayce, Azithromycin and Ethambutol held, with empiric doxycycline provided for her COPD exacerbation  - home with prednisone taper, doxycycline 100 mg PO BID to complete 7 days  - patient to resume home Breztri.  Rx provided for albuterol refill  - Ms Reeys did experience oxygen desaturations to 84% with walking, discussed with pulmonary.  Home oxygen arranged  - ID followed while hospitalized, patient to followup with ID Dr Adrian Segovia on Tuesday 6/10 as previously scheduled  - follow up with PCP in one week.  - follow up with Pulmonary as scheduled    Chronic DVT  - patient initially diagnosed with a subacute DVT in 4/2024.  She had undergone surgery for a calcaneal fracture and achilles tendon rupture the previous fall, and had a prolonged period of relative immobility post surgery.  - Ms Reyes has been maintained on eliquis since her DVT was diagnosed, however NOACs and coumadin present significant drug interactions with planned mediations for MAC treatment.  - Patient seen by Hematology Dr Wilkins. Will transition Ms Reyes to lovenox 80 mg subq q12.  Lovenox teaching provided by nursing staff prior to discharge.  - Lovenox refills per Hematology/PCP, discussed with  patient   - hypercoagulable panel sent prior to discharge home, results can be reviewed at Hematology followup  - referral to Vascular Surgery planned as well, Hematology to finalize arrangements.    Anemia  - hemoglobin 10.3, appears anemia has been present for approximately one year, somewhat concurrent with eliquis use.  - patient says her last colonoscopy was three years ago  - followup with PCP with further anemia workup as needed    HTN     HLD     Prediabetes         Discharge Follow Up Recommendations for outpatient labs/diagnostics:    Anemia workup as needed by PCP, consider iron studies and CBC     Day of Discharge     HPI:   Minimal cough.  Shortness of breath and chest tightness much improved compared to admission.  Ms Reyes says she would like to be discharged home tonight.  Her sister will  her lovenox prescription from [facility].  She understands that she will initially only be provided with a 5 day supply of lovenox due to pharmacy availability, and the rest of the first month's Rx can be picked up at [facility] in a couple of days.  Discussed that refills will be coordinated by Dr Wilkins or her PCP.    Review of Systems  Gen: no fever    Vital Signs:   Temp:  [97.8 °F (36.6 °C)-98 °F (36.7 °C)] 98 °F (36.7 °C)  Heart Rate:  [69-80] 76  Resp:  [16-18] 16  BP: (130-170)/(51-66) 137/55  Flow (L/min) (Oxygen Therapy):  [1-2] 1      Physical Exam:  Non toxic, in bed  MM moist  RRR  Breath sounds clear bilaterally  Abdomen soft, NT  Alert, speech clear  Normal affect    Pertinent  and/or Most Recent Results     LAB RESULTS:      Lab 06/07/25  0405 06/06/25  1101   WBC 9.88 8.13   HEMOGLOBIN 10.3* 11.2*   HEMATOCRIT 32.5* 35.4   PLATELETS 234 261   NEUTROS ABS 8.69* 5.12   IMMATURE GRANS (ABS) 0.11* 0.01   LYMPHS ABS 0.84 1.61   MONOS ABS 0.19 0.57   EOS ABS 0.02 0.74*   MCV 83.5 84.1   LACTATE  --  1.6         Lab 06/07/25  0405 06/06/25  1101   SODIUM 141 141   POTASSIUM 4.1 3.9   CHLORIDE 103 104    CO2 22.0 24.1   ANION GAP 16.0* 12.9   BUN 19.8 18.6   CREATININE 1.07* 1.12*   EGFR 56.3* 53.3*   GLUCOSE 192* 102*   CALCIUM 9.2 9.6         Lab 06/06/25  1101   TOTAL PROTEIN 7.3   ALBUMIN 4.4   GLOBULIN 2.9   ALT (SGPT) 12   AST (SGOT) 17   BILIRUBIN 0.5   ALK PHOS 82         Lab 06/06/25  1202 06/06/25  1101   PROBNP  --  132.0   HSTROP T 13 16*                 Lab 06/06/25  1333   PH, ARTERIAL 7.417   PCO2, ARTERIAL 36.5   PO2 ART 91.3   FIO2 28   HCO3 ART 23.5   BASE EXCESS ART -0.8*   CARBOXYHEMOGLOBIN 0.9     Brief Urine Lab Results  (Last result in the past 365 days)        Color   Clarity   Blood   Leuk Est   Nitrite   Protein   CREAT   Urine HCG        09/26/24 1400 Yellow   Clear   Negative   Large (3+)   Negative   Negative                 Microbiology Results (last 10 days)       Procedure Component Value - Date/Time    Respiratory Panel PCR w/COVID-19(SARS-CoV-2) UMAIR/NIRU/SOPHIA/PAD/COR/IBIS In-House, NP Swab in UTM/VTM, 2 HR TAT - Swab, Nasopharynx [[id number]]  (Normal) Collected: 06/06/25 1118    Lab Status: Final result Specimen: Swab from Nasopharynx Updated: 06/06/25 1212     ADENOVIRUS, PCR Not Detected     Coronavirus 229E Not Detected     Coronavirus HKU1 Not Detected     Coronavirus NL63 Not Detected     Coronavirus OC43 Not Detected     COVID19 Not Detected     Human Metapneumovirus Not Detected     Human Rhinovirus/Enterovirus Not Detected     Influenza A PCR Not Detected     Influenza B PCR Not Detected     Parainfluenza Virus 1 Not Detected     Parainfluenza Virus 2 Not Detected     Parainfluenza Virus 3 Not Detected     Parainfluenza Virus 4 Not Detected     RSV, PCR Not Detected     Bordetella pertussis pcr Not Detected     Bordetella parapertussis PCR Not Detected     Chlamydophila pneumoniae PCR Not Detected     Mycoplasma pneumo by PCR Not Detected    Narrative:      In the setting of a positive respiratory panel with a viral infection PLUS a negative procalcitonin without other  underlying concern for bacterial infection, consider observing off antibiotics or discontinuation of antibiotics and continue supportive care. If the respiratory panel is positive for atypical bacterial infection (Bordetella pertussis, Chlamydophila pneumoniae, or Mycoplasma pneumoniae), consider antibiotic de-escalation to target atypical bacterial infection.            CT Angiogram Chest Pulmonary Embolism  Result Date: 6/6/2025  CT ANGIOGRAM CHEST PULMONARY EMBOLISM Date of Exam: 6/6/2025 2:58 PM EDT Indication: Pulmonary Embolism. Comparison: CT chest 3/29/2025 Technique: Axial CT images were obtained of the chest after the uneventful intravenous administration of 85 mL Isovue-370 utilizing pulmonary embolism protocol.  In addition, a 3-D volume rendered image was created for interpretation.  Reconstructed coronal and sagittal images were also obtained. Automated exposure control and iterative construction methods were used. Findings: Normal appearance of the pulmonary arteries without evidence of pulmonary embolism. There is atherosclerosis of the descending thoracic aorta and aortic arch. No pericardial effusion. There are coronary artery calcifications. No thoracic adenopathy. Chest wall soft tissues are unremarkable. The trachea and mainstem bronchi are patent. The smaller peripheral airways are unremarkable. Redemonstration of numerous centrilobular and subpleural clusters of tree-in-bud nodularity and mostly subcentimeter nodularity in both lungs. This overall appears improved although some of the nodules appear slightly increased compared to prior, for example a triangular subpleural 10 mm nodule in the lateral right lower lobe (series 4 image 69), previously measuring approximately 7 mm. Redemonstration of confluent subpleural nodularity at the posterior right lung base (series 4 image 90). No pleural effusion. No pneumothorax. No acute or suspicious bony findings. There is a loop recorder device in the  subcutaneous tissues of the left anterior chest. Unremarkable appearance of the partially imaged upper abdomen.     Impression: No evidence of pulmonary embolism. Redemonstration of numerous centrilobular and subpleural clusters of tree-in-bud nodularity and mostly subcentimeter nodularity in both lungs. This appears overall improved although some of the nodules appear slightly increased compared to prior. This appearance is most compatible with chronic infectious or inflammatory process. Electronically Signed: August King MD  6/6/2025 3:37 PM EDT  Workstation ID: [id number]    XR Chest 1 View  Result Date: 6/6/2025  XR CHEST 1 VW Date of Exam: 6/6/2025 11:16 AM EDT Indication: SOA triage protocol Comparison: 3/19/2025 Findings: Heart size is at the upper limits of normal. Pulmonary vessels are normal. There is improving airspace disease within the right lung. Left lung is clear. There are no pleural effusions. No pneumothorax.     Impression: 1. Improving nodular right-sided airspace disease. No new or worsening airspace consolidation. No pleural effusion. Electronically Signed: Golden Camargo MD  6/6/2025 12:00 PM EDT  Workstation ID: [id number]      Results for orders placed during the hospital encounter of 03/25/25    Duplex Venous Lower Extremity - Right CAR    Interpretation Summary    Chronic right lower extremity deep vein thrombosis noted in the proximal femoral and popliteal.      Results for orders placed during the hospital encounter of 03/25/25    Duplex Venous Lower Extremity - Right CAR    Interpretation Summary    Chronic right lower extremity deep vein thrombosis noted in the proximal femoral and popliteal.          Plan for Follow-up of Pending Labs/Results:   Pending Labs       Order Current Status    Anticardiolipin Antibody, IgG / M, Qn Collected (06/09/25 1805)    Antithrombin III Collected (06/09/25 1806)    Beta-2 Glycoprotein Antibodies Collected (06/09/25 1805)    D-dimer, Quantitative  Collected (06/09/25 1805)    Factor 5 Leiden Collected (06/09/25 1805)    Factor II, DNA Analysis Collected (06/09/25 1805)    Lupus Anticoagulant Collected (06/09/25 1806)    Protein C Activity Collected (06/09/25 1805)    Protein C Antigen, Total Collected (06/09/25 1806)    Protein S Antigen, Free Collected (06/09/25 1806)    Protein S Antigen, Total Collected (06/09/25 1805)    Protein S Functional Collected (06/09/25 1805)          Discharge Details        Discharge Medications        New Medications        Instructions Start Date   doxycycline 100 MG capsule  Commonly known as: MONODOX   100 mg, Oral, Every 12 Hours Scheduled      enoxaparin sodium 80 MG/0.8ML solution prefilled syringe syringe  Commonly known as: LOVENOX   1 mg/kg (80 mg), Subcutaneous, Every 12 Hours      predniSONE 10 MG tablet  Commonly known as: DELTASONE   Take 4 tablets daily by mouth for two days, then take 3 tablets daily for two days, then take 2 tablets daily for two days, then take 1 tablet daily for two days, then stop.             Changes to Medications        Instructions Start Date   albuterol sulfate  (90 Base) MCG/ACT inhaler  Commonly known as: PROVENTIL HFA;VENTOLIN HFA;PROAIR HFA  What changed:   how to take this  reasons to take this   2 puffs, Inhalation, Every 4 Hours PRN             Continue These Medications        Instructions Start Date   aspirin 81 MG tablet   81 mg, Daily      atorvastatin 40 MG tablet  Commonly known as: LIPITOR   40 mg, Oral, Daily      Breztri Aerosphere 160-9-4.8 MCG/ACT aerosol inhaler  Generic drug: Budeson-Glycopyrrol-Formoterol   2 puffs, Inhalation, 2 Times Daily      butalbital-acetaminophen  MG tablet tablet   1-2 tablets, Oral, Every 6 Hours PRN      chlorthalidone 25 MG tablet  Commonly known as: HYGROTON   25 mg, Oral, Daily      Hydrocod Aly-Chlorphe Aly ER 10-8 MG/5ML ER suspension  Commonly known as: TUSSIONEX PENNKINETIC   TAKE 5ML BY MOUTH EVERY 12 HOURS AS NEEDED  FOR COUGH      ipratropium-albuterol 0.5-2.5 mg/3 ml nebulizer  Commonly known as: DUO-NEB   3 mL, Nebulization, 4 Times Daily PRN      losartan 100 MG tablet  Commonly known as: COZAAR   100 mg, Oral, Daily      pioglitazone 15 MG tablet  Commonly known as: Actos   15 mg, Oral, Daily      prochlorperazine 5 MG tablet  Commonly known as: COMPAZINE   5-10 mg, Oral, Every 6 Hours PRN             Stop These Medications      Arikayce 590 MG/8.4ML suspension  Generic drug: Amikacin Sulfate Liposome     azithromycin 500 MG tablet  Commonly known as: ZITHROMAX     Eliquis 5 MG tablet tablet  Generic drug: apixaban     ethambutol 400 MG tablet  Commonly known as: MYAMBUTOL              Allergies   Allergen Reactions    Erythromycin GI Intolerance     Nausea      Amlodipine Other (See Comments)     Pt states she does not remember an allergy to this medicine, states her legs were swelling    Lisinopril Other (See Comments)     Joint pain    Sulfa Antibiotics Unknown (See Comments)     Pt's home pharmacy has sulfa drugs listed as an allergy.         Discharge Disposition:  Home or Self Care    Diet:  Hospital:  Diet Order   Procedures    Diet: Cardiac, Diabetic; Healthy Heart (2-3 Na+); Consistent Carbohydrate; Fluid Consistency: Thin (IDDSI 0)            Activity:      Restrictions or Other Recommendations:         CODE STATUS:    Code Status and Medical Interventions: CPR (Attempt to Resuscitate); Full Support   Ordered at: 06/06/25 2034     Code Status (Patient has no pulse and is not breathing):    CPR (Attempt to Resuscitate)     Medical Interventions (Patient has pulse or is breathing):    Full Support       Future Appointments   Date Time Provider Department Center   6/17/2025  1:00 PM Gerri Purcell APRN [facility]   6/19/2025  3:30 PM Shawn Wilkins MD [facility]   8/21/2025 10:00 AM Mago Bowden APRN [facility]   2/16/2026 10:30 AM Rich Zaragoza MD [facility]   4/22/2026 11:45 AM  Alex Josue MD [facility]       Additional Instructions for the Follow-ups that You Need to Schedule       Discharge Follow-up with PCP   As directed       Currently Documented PCP:    Rich Zaragoza MD    PCP Phone Number:    [phone]     Follow Up Details: follow up with PCP in one week        Discharge Follow-up with PCP   As directed       Currently Documented PCP:    Rich Zaragoza MD    PCP Phone Number:    [phone]     Follow Up Details: follow up with PCP in one week        Discharge Follow-up with Specialty: Follow up with Vascular Surgery per Hematology Dr Wilkins instructions   As directed      Specialty: Follow up with Vascular Surgery per Hematology Dr Wilkins instructions        Discharge Follow-up with Specialty: follow up with Hematology Dr Wilkins 6/19/25 as scheduled   As directed      Specialty: follow up with Hematology Dr Wilkins 6/19/25 as scheduled        Discharge Follow-up with Specialty: follow up with Infectious Disease Dr Segovia Tuedie 6/10 as scheduled   As directed      Specialty: follow up with Infectious Disease Dr Segovia Tuedie 6/10 as scheduled        Discharge Follow-up with Specialty: follow up with Infectious Disease Dr Adrian Segovia tomorrow 6/10 as scheduled   As directed      Specialty: follow up with Infectious Disease Dr Adrian Segovia tomorrow 6/10 as scheduled        Discharge Follow-up with Specialty: follow up with Pulmonary on 8/21/25 as scheduled   As directed      Specialty: follow up with Pulmonary on 8/21/25 as scheduled                      Zheng Stevenson MD  06/09/25      Time Spent on Discharge:  I spent  60  minutes on this discharge activity which included: face-to-face encounter with the patient, reviewing the data in the system, coordination of the care with the nursing staff as well as consultants, documentation, and entering orders.

## 2025-06-09 NOTE — CASE MANAGEMENT/SOCIAL WORK
Continued Stay Note  [facility]     Patient Name: Agustina Reyes  MRN: [id number]  Today's Date: 6/9/2025    Admit Date: 6/6/2025    Plan: Home   Discharge Plan       Row Name 06/09/25 1004       Plan    Plan Home    Patient/Family in Agreement with Plan yes    Plan Comments I was contacted by RN that patient will need oxygen @ exertion. Documented by RN on nights that she decreased down to 86% on room air while ambulating but no documentation of walking w/2LNC placed, will need for insurance purposes.  I met w/Ms. Reyes in room, she resides in [facility] w/her sister. Her d/c plan is home w/her sister, she will transport @ d/c. SHe does have COPD diagnosis, but she feels her s/s were results of her inhaled antibiotic that she was on. I notified MAXWELL Yan that she will need a walking w/o O2, if drops to less than 88%, will need walking w/O2 placement. Will await walking room air/O2 results.    Final Discharge Disposition Code 01 - home or self-care                   Discharge Codes    No documentation.                 Expected Discharge Date and Time       Expected Discharge Date Expected Discharge Time    Jun 8, 2025               Eron Garner RN

## 2025-06-09 NOTE — PLAN OF CARE
Problem: Adult Inpatient Plan of Care  Goal: Plan of Care Review  Outcome: Progressing  Goal: Patient-Specific Goal (Individualized)  Outcome: Progressing  Goal: Absence of Hospital-Acquired Illness or Injury  Outcome: Progressing  Intervention: Identify and Manage Fall Risk  Recent Flowsheet Documentation  Taken 6/9/2025 0000 by Noah Booker RN  Safety Promotion/Fall Prevention:   assistive device/personal items within reach   clutter free environment maintained   fall prevention program maintained   lighting adjusted   nonskid shoes/slippers when out of bed   room organization consistent   safety round/check completed   toileting scheduled  Taken 6/8/2025 2002 by Noah Booker RN  Safety Promotion/Fall Prevention:   assistive device/personal items within reach   clutter free environment maintained   fall prevention program maintained   lighting adjusted   nonskid shoes/slippers when out of bed   room organization consistent   safety round/check completed   toileting scheduled  Intervention: Prevent Skin Injury  Recent Flowsheet Documentation  Taken 6/9/2025 0000 by Noah Booker RN  Body Position:   position changed independently   left  Taken 6/8/2025 2002 by Noah Booker RN  Skin Protection: incontinence pads utilized  Intervention: Prevent and Manage VTE (Venous Thromboembolism) Risk  Recent Flowsheet Documentation  Taken 6/8/2025 2002 by Noah Booker RN  VTE Prevention/Management: (freq ambulation, PO Eliquis)   bilateral   SCDs (sequential compression devices) off   patient refused intervention   other (see comments)  Intervention: Prevent Infection  Recent Flowsheet Documentation  Taken 6/9/2025 0000 by Noah Booker RN  Infection Prevention:   environmental surveillance performed   equipment surfaces disinfected   hand hygiene promoted   rest/sleep promoted   single patient room provided  Taken 6/8/2025 2002 by Noah Booker  RN  Infection Prevention:   environmental surveillance performed   equipment surfaces disinfected   hand hygiene promoted   rest/sleep promoted   single patient room provided  Goal: Optimal Comfort and Wellbeing  Outcome: Progressing  Intervention: Monitor Pain and Promote Comfort  Recent Flowsheet Documentation  Taken 6/8/2025 2002 by Noah Booker RN  Pain Management Interventions:   care clustered   medication offered but refused   pain management plan reviewed with patient/caregiver   pillow support provided   position adjusted   quiet environment facilitated   relaxation techniques promoted  Intervention: Provide Person-Centered Care  Recent Flowsheet Documentation  Taken 6/8/2025 2002 by Noah Booker RN  Trust Relationship/Rapport:   care explained   choices provided   emotional support provided   empathic listening provided   questions answered   questions encouraged   reassurance provided   thoughts/feelings acknowledged  Goal: Readiness for Transition of Care  Outcome: Progressing     Problem: Pain Acute  Goal: Optimal Pain Control and Function  Outcome: Progressing  Intervention: Optimize Psychosocial Wellbeing  Recent Flowsheet Documentation  Taken 6/8/2025 2002 by Noah Booker RN  Supportive Measures: active listening utilized  Diversional Activities:   television   smartphone  Intervention: Develop Pain Management Plan  Recent Flowsheet Documentation  Taken 6/8/2025 2002 by Noah Booker RN  Pain Management Interventions:   care clustered   medication offered but refused   pain management plan reviewed with patient/caregiver   pillow support provided   position adjusted   quiet environment facilitated   relaxation techniques promoted  Intervention: Prevent or Manage Pain  Recent Flowsheet Documentation  Taken 6/9/2025 0000 by Noah Booker RN  Medication Review/Management: medications reviewed  Taken 6/8/2025 2002 by Noah Booker RN  Sensory  Stimulation Regulation:   auditory stimulation minimized   care clustered   lighting decreased   quiet environment promoted  Bowel Elimination Promotion:   adequate fluid intake promoted   ambulation promoted   privacy promoted  Sleep/Rest Enhancement: awakenings minimized  Medication Review/Management: medications reviewed   Goal Outcome Evaluation:

## 2025-06-09 NOTE — CASE MANAGEMENT/SOCIAL WORK
Continued Stay Note  [facility]     Patient Name: Agustina Reyes  MRN: [id number]  Today's Date: 6/9/2025    Admit Date: 6/6/2025    Plan: HOme   Discharge Plan       Row Name 06/09/25 1044       Plan    Plan HOme    Patient/Family in Agreement with Plan yes    Plan Comments Walking sat completed did drop to 86% w/o while ambulating, w/1LNC and ambulating came up to 93%. I entered O2 order into Epic, and [facility], will deliver to room prior to d/c. Patient agreeable. Patient independent, employed FT thru [employer], stated that she sometimes ambulates 2-3 miles daily @ store. D/c plan remains home w/family transporting no other dc needs expressed @ this time.     Final Discharge Disposition Code 01 - home or self-care      Row Name 06/09/25 1004       Plan    Plan Home    Patient/Family in Agreement with Plan yes    Plan Comments I was contacted by RN that patient will need oxygen @ exertion. Documented by RN on nights that she decreased down to 86% on room air while ambulating but no documentation of walking w/2LNC placed, will need for insurance purposes.  I met w/Ms. Reyes in room, she resides in [facility] w/her sister. Her d/c plan is home w/her sister, she will transport @ d/c. SHe does have COPD diagnosis, but she feels her s/s were results of her inhaled antibiotic that she was on. I notified MAXWELL Yan that she will need a walking w/o O2, if drops to less than 88%, will need walking w/O2 placement. Will await walking room air/O2 results.    Final Discharge Disposition Code 01 - home or self-care                   Discharge Codes    No documentation.                 Expected Discharge Date and Time       Expected Discharge Date Expected Discharge Time    Jun 8, 2025               Eron Garner RN

## 2025-06-09 NOTE — NURSING NOTE
Lovenox self administer video shown and patient verbalized understanding. Night shift nurse agreeable to finish teaching patient how to administer lovenox shot to herself at home, HEMALATHA RN

## 2025-06-09 NOTE — CONSULTS
HEMATOLOGY/ONCOLOGY INPATIENT CONSULT    REASON FOR CONSULT: Chronic DVT    Subjective   HISTORY OF PRESENT ILLNESS; I am asked to see this 69 y.o.  adult, referred for chronic DVT in need of cessation of Eliquis for antifungal therapy      Past Medical History:   Diagnosis Date    Cataract 2017    COPD (chronic obstructive pulmonary disease) May2020    COVID-19 virus infection 12/30/2024    Deep vein thrombosis April2024    GERD (gastroesophageal reflux disease)     Hyperlipidemia     Hypertension     Migraines     Occipital neuralgia of left side     Pneumonia     PONV (postoperative nausea and vomiting)     Shingles     Stroke 2014    no residual effects    Umbilical hernia      Past Surgical History:   Procedure Laterality Date    BRONCHOSCOPY N/A 8/19/2024    Procedure: BRONCHOSCOPY WITH BRONCHOALVEOLAR LAVAGE;  Surgeon: Aria Irwin DO;  Location: [facility] ENDOSCOPY;  Service: Pulmonary;  Laterality: N/A;    CATARACT EXTRACTION Bilateral     COLONOSCOPY      FOOT SURGERY Left     x4    HYSTERECTOMY  2000    SUBTOTAL HYSTERECTOMY         No current facility-administered medications on file prior to encounter.     Current Outpatient Medications on File Prior to Encounter   Medication Sig Dispense Refill    aspirin 81 MG tablet Take 1 tablet by mouth Daily.      atorvastatin (LIPITOR) 40 MG tablet Take 1 tablet by mouth Daily. 90 tablet 3    azithromycin (ZITHROMAX) 500 MG tablet Take 1 tablet by mouth Daily.      Budeson-Glycopyrrol-Formoterol (Breztri Aerosphere) 160-9-4.8 MCG/ACT aerosol inhaler INHALE 2 PUFFS BY MOUTH TWICE DAILY 3 each 3    chlorthalidone (HYGROTON) 25 MG tablet TAKE 1 TABLET BY MOUTH DAILY 90 tablet 3    Eliquis 5 MG tablet tablet TAKE 1 TABLET BY MOUTH TWICE DAILY 60 tablet 2    ethambutol (MYAMBUTOL) 400 MG tablet Take 3 tablets by mouth Daily.      losartan (COZAAR) 100 MG tablet Take 1 tablet by mouth Daily. 90 tablet 3    Arikayce 590 MG/8.4ML suspension        butalbital-acetaminophen  MG tablet tablet Take 1-2 tablets by mouth Every 6 (Six) Hours As Needed (migraines). 15 each 0    Hydrocod Aly-Chlorphe Aly ER (TUSSIONEX PENNKINETIC) 10-8 MG/5ML ER suspension TAKE 5ML BY MOUTH EVERY 12 HOURS AS NEEDED FOR COUGH      ipratropium-albuterol (DUO-NEB) 0.5-2.5 mg/3 ml nebulizer Take 3 mL by nebulization 4 (Four) Times a Day As Needed for Wheezing. 120 mL 5    pioglitazone (Actos) 15 MG tablet Take 1 tablet by mouth Daily. 30 tablet 3    prochlorperazine (COMPAZINE) 5 MG tablet Take 1-2 tablets by mouth Every 6 (Six) Hours As Needed for Nausea or Vomiting (migraine). 20 tablet 5       Allergies   Allergen Reactions    Erythromycin GI Intolerance     Nausea      Amlodipine Other (See Comments)     Pt states she does not remember an allergy to this medicine, states her legs were swelling    Lisinopril Other (See Comments)     Joint pain    Sulfa Antibiotics Unknown (See Comments)     Pt's home pharmacy has sulfa drugs listed as an allergy.       Social History     Socioeconomic History    Marital status: Single   Tobacco Use    Smoking status: Former     Current packs/day: 0.00     Average packs/day: 1 pack/day for 40.0 years (40.0 ttl pk-yrs)     Types: Cigarettes     Start date: 8/4/1974     Quit date: 8/4/2014     Years since quitting: 10.8     Passive exposure: Past    Smokeless tobacco: Never   Vaping Use    Vaping status: Never Used   Substance and Sexual Activity    Alcohol use: Yes     Comment: occ.    Drug use: No    Sexual activity: Defer       Family History   Problem Relation Age of Onset    Heart disease Mother     Stroke Mother     Hypertension Mother     Heart disease Father     Hypertension Father     Hearing loss Father     Stroke Maternal Grandfather     No Known Problems Sister     Breast cancer Neg Hx     Ovarian cancer Neg Hx          REVIEW OF SYSTEMS:  A 14 point review of systems was performed and is negative except as noted above.    Objective  "  PHYSICAL EXAM:    /55 (BP Location: Left arm, Patient Position: Lying)   Pulse 76   Temp 98 °F (36.7 °C) (Oral)   Resp 16   Ht 167.6 cm (66\")   Wt 79.4 kg (175 lb)   LMP  (LMP Unknown)   SpO2 (!) 89%   BMI 28.25 kg/m²               ECOG: (1) Restricted in Physically Strenuous Activity, Ambulatory & Able to Do Work of Light Nature  General: well appearing adult in no acute distress  HEENT: sclerae anicteric, oropharynx clear  Lymphatics: no cervical, supraclavicular, inguinal, or axillary adenopathy  Neck: Supple. No thyromegaly.  Cardiovascular: regular rate and rhythm, no murmurs  Lungs: clear to auscultation bilaterally. No respiratory distress  Abdomen: soft, nontender, nondistended.  No palpable organomegaly  Extremities: no lower extremity edema, cyanosis, or clubbing  Skin: no rashes, lesions, bruising, or petechiae  Neuro: Alert and oriented x3. Moves all extremities.    Results:    Results from last 7 days   Lab Units 06/07/25  0405 06/06/25  1101   WBC 10*3/mm3 9.88 8.13   HEMOGLOBIN g/dL 10.3* 11.2*   PLATELETS 10*3/mm3 234 261     Results from last 7 days   Lab Units 06/07/25  0405 06/06/25  1101   SODIUM mmol/L 141 141   POTASSIUM mmol/L 4.1 3.9   CO2 mmol/L 22.0 24.1   BUN mg/dL 19.8 18.6   CREATININE mg/dL 1.07* 1.12*   GLUCOSE mg/dL 192* 102*     Results from last 7 days   Lab Units 06/06/25  1101   AST (SGOT) U/L 17   ALT (SGPT) U/L 12   BILIRUBIN mg/dL 0.5   ALK PHOS U/L 82     Results from last 7 days   Lab Units 06/06/25  1333   PH, ARTERIAL pH units 7.417   PCO2, ARTERIAL mm Hg 36.5   PO2 ART mm Hg 91.3     CT Angiogram Chest Pulmonary Embolism  Result Date: 6/6/2025  Impression: No evidence of pulmonary embolism. Redemonstration of numerous centrilobular and subpleural clusters of tree-in-bud nodularity and mostly subcentimeter nodularity in both lungs. This appears overall improved although some of the nodules appear slightly increased compared to prior. This appearance is most " compatible with chronic infectious or inflammatory process. Electronically Signed: August King MD  6/6/2025 3:37 PM EDT  Workstation ID: [id number]    XR Chest 1 View  Result Date: 6/6/2025  Impression: 1. Improving nodular right-sided airspace disease. No new or worsening airspace consolidation. No pleural effusion. Electronically Signed: Golden Camargo MD  6/6/2025 12:00 PM EDT  Workstation ID: [id number]      Assessment    ASSESSMENT & PLAN:    1.  Chronic DVT  2.  DESMOND in need of rifampin complicated by treatment with DOAC    Discussion: patient with remote history of CVA treated temporarily with Plavix then years later July 2023 fractured her left heel accidentally.  Pinning subsequently failed and had multiple surgeries and was immobile for the most part July through November 2023.  Did use sequential compression device on right leg.  Despite this.  March 2024 started having leg swelling.  A month later saw Dr. Zaragoza.  April 2024 Doppler showed subacute right lower extremity deep vein thrombosis proximal femoral and popliteal.  He started on DOAC.  August 2024 Doppler showed chronic DVT unchanged and he kept on Eliquis.  He performed another Doppler March 2025 that still showed the chronic DVT right lower extremity in the proximal femoral and popliteal.  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  My strong suspicion is that the immobilization was her provocation but rather than Cavalierly stopping anticoagulation and trusting that Theory completely, I will stop her Eliquis and place her on subcu Lovenox 1 mg/kg subcu every 12 hours and check her hypercoagulable workup for completeness sake.  I explained to her that even some of these abnormalities if found are not necessarily prove of cause and effect but would shade our decisions down the road as to keeping her on Lovenox for a year and switching back to  Eliquis once off the antifungal or whether to simply stop anticoagulation.  I will also get her to see Dr. Penn with whom I have spoken today for vascular ultrasonographic interrogation and monitoring to give us an opinion as to anatomic risks of cessation given the proximity of the chronic clot.  I think it is virtually certain that this chronic clot will never vanished on anticoagulation and chronic thrombosis by itself is not necessarily a de facto indication for lifetime anticoagulation.    I have discussed this with Dr. Payton, Dr. Segovia, and Dr. Penn.  Total time of care discussing this with patient and sorting through her chart and putting together this plan took 90 minutes patient care time throughout the day today.  Shawn Wilkins MD    6/9/2025

## 2025-06-09 NOTE — NURSING NOTE
Ambulated patient 250 feet O2 sat dropped to 86%, placed on 1L NC O2 when returned to room, patient O2 sat increased to 93%, TGS RN

## 2025-06-09 NOTE — PROGRESS NOTES
INFECTIOUS DISEASE Progress Note    Agustina Reyes  1955  [id number]    Date of Consult: 6/7/2025    Admission Date: 6/6/2025    Requesting Provider: Veronica GODOY  Evaluating Physician: Rayo Manzo MD    Reason for Consultation: f/u for pulmonary DESMOND    History of present illness:    6/7/25: Patient is a 69 y.o. adult, known to Dr. 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  She was transferred to [facility] for further medical management of hypoxia.  She has had hoarseness and diminished voice since starting Arikayce, but she has had increased wheezing over the last 3 days.  She has had to use her rescue inhaler at home along with Brestri.  She did call [facility] yesterday and was advised to stop the Arikayce and come to ED for steroids and nebulized breathing treatments.  She denied fever or chills.  A CXR showed improving nodular right-sided airspace disease.  A CTA of chest on 6/6 showed numerous centriolobular and subpleural clusters of tree-in-bud nodularity and mostly subcentimeter nodularity in both lungs that appears overall imprved from prior CTs of chest with no evidence of PE. She remains afebrile with some hypoxia and is currently on 2L O2 NC.  Initial las were WBC 8100 with 63% neutrophils, lactic acid 1.6, proBNP 132, creatinine 1.12, and ABG with pO2 91.3/pCO2 36.5. A respiratory panel PCR was negative. She is currently on Azithromycin and Unasyn.  ID was asked to evaluate and manage her antibiotic therapy.  Feels better today but still complains of significant cough and is worried about her persistent O2 requirement    6/9/25: She remains afebrile.   The nursing notes indicate  oxygen desaturation to 84-85 while ambulating.    Respiratory virus panel PCR on 6/6 was negative.  The DESMOND sensitivities performed at [facility] in [place] from 3/20/2025 returned on 6/4.  Her isolate is sensitive to clofazimine but resistant to moxifloxacin and linezolid. High level macrolide resistance is not predicted. Ethambutol TERE is 2.5.  The isolate is sensitive to  nebulized liposomal amikacin.  The isolate is resistant to doxycycline and Bactrim.    She denies increased cough and sputum production.  She was scheduled to see Dr. Wilkins in hematology last Friday but is now scheduled to see him on 6/19.    Past Medical History:   Diagnosis Date    Cataract 2017    COPD (chronic obstructive pulmonary disease) May2020    COVID-19 virus infection 12/30/2024    Deep vein thrombosis April2024    GERD (gastroesophageal reflux disease)     Hyperlipidemia     Hypertension     Migraines     Occipital neuralgia of left side     Pneumonia     PONV (postoperative nausea and vomiting)     Shingles     Stroke 2014    no residual effects    Umbilical hernia        Past Surgical History:   Procedure Laterality Date    BRONCHOSCOPY N/A 8/19/2024    Procedure: BRONCHOSCOPY WITH BRONCHOALVEOLAR LAVAGE;  Surgeon: Aria Irwin DO;  Location: [facility] ENDOSCOPY;  Service: Pulmonary;  Laterality: N/A;    CATARACT EXTRACTION Bilateral     COLONOSCOPY      FOOT SURGERY Left     x4    HYSTERECTOMY  2000    SUBTOTAL HYSTERECTOMY         Family History   Problem Relation Age of Onset    Heart disease Mother     Stroke Mother     Hypertension Mother     Heart disease Father     Hypertension Father     Hearing loss Father     Stroke Maternal Grandfather     No Known Problems Sister     Breast cancer Neg Hx     Ovarian cancer Neg Hx        Social History     Socioeconomic History    Marital status: Single   Tobacco Use    Smoking status: Former     Current packs/day: 0.00     Average packs/day: 1 pack/day for 40.0 years (40.0 ttl pk-yrs)     Types: Cigarettes      Start date: 8/4/1974     Quit date: 8/4/2014     Years since quitting: 10.8     Passive exposure: Past    Smokeless tobacco: Never   Vaping Use    Vaping status: Never Used   Substance and Sexual Activity    Alcohol use: Yes     Comment: occ.    Drug use: No    Sexual activity: Defer       Allergies   Allergen Reactions    Erythromycin GI Intolerance     Nausea      Amlodipine Other (See Comments)     Pt states she does not remember an allergy to this medicine, states her legs were swelling    Lisinopril Other (See Comments)     Joint pain    Sulfa Antibiotics Unknown (See Comments)     Pt's home pharmacy has sulfa drugs listed as an allergy.         Medication:    Current Facility-Administered Medications:     acetaminophen (TYLENOL) tablet 650 mg, 650 mg, Oral, Q4H PRN **OR** acetaminophen (TYLENOL) 160 MG/5ML oral solution 650 mg, 650 mg, Oral, Q4H PRN **OR** acetaminophen (TYLENOL) suppository 650 mg, 650 mg, Rectal, Q4H PRN, Vibshama Veronica M, APRN    apixaban (ELIQUIS) tablet 5 mg, 5 mg, Oral, BID, Veronica Bejarano, APRN, 5 mg at 06/09/25 0803    aspirin EC tablet 81 mg, 81 mg, Oral, Daily, Darci Veronica M, APRN, 81 mg at 06/09/25 0803    atorvastatin (LIPITOR) tablet 40 mg, 40 mg, Oral, Daily, Darci Veronica M, APRN, 40 mg at 06/09/25 0803    sennosides-docusate (PERICOLACE) 8.6-50 MG per tablet 2 tablet, 2 tablet, Oral, BID PRN **AND** polyethylene glycol (MIRALAX) packet 17 g, 17 g, Oral, Daily PRN **AND** bisacodyl (DULCOLAX) EC tablet 5 mg, 5 mg, Oral, Daily PRN **AND** bisacodyl (DULCOLAX) suppository 10 mg, 10 mg, Rectal, Daily PRN, Vibshama Veronica M, APRN    budesonide (PULMICORT) nebulizer solution 0.5 mg, 0.5 mg, Nebulization, BID - RT, Veronica Bejarano, APRN, 0.5 mg at 06/08/25 2002    chlorthalidone (HYGROTON) tablet 25 mg, 25 mg, Oral, Daily, Veronica Bejarano APRN, 25 mg at 06/09/25 0803    dextrose (D50W) (25 g/50 mL) IV injection 25 g, 25 g, Intravenous, Q15 Min PRN, Veronica Bejarano APRN    dextrose (GLUTOSE)  oral gel 15 g, 15 g, Oral, Q15 Min PRN, Veronica Bejarano APRN    doxycycline (MONODOX) capsule 100 mg, 100 mg, Oral, Q12H, Gaetano Parra PA, 100 mg at 06/09/25 0803    glucagon (GLUCAGEN) injection 1 mg, 1 mg, Intramuscular, Q15 Min PRN, Veronica Bejarano APRN    guaiFENesin-dextromethorphan (ROBITUSSIN DM) 100-10 MG/5ML syrup 10 mL, 10 mL, Oral, Q6H PRN, Veronica Bejarano APRN    Hydrocod Aly-Chlorphe Aly ER (TUSSIONEX PENNKINETIC) 10-8 MG/5ML ER suspension 5 mL, 5 mL, Oral, Q12H PRN, Veronica Bejarano APRN, 5 mL at 06/08/25 2020    Insulin Lispro (humaLOG) injection 2-7 Units, 2-7 Units, Subcutaneous, 4x Daily AC & at Bedtime, Veronica Bejarano APRN, 2 Units at 06/07/25 1637    ipratropium-albuterol (DUO-NEB) nebulizer solution 3 mL, 3 mL, Nebulization, 4x Daily PRN, Veronica Bejarano APRN    ipratropium-albuterol (DUO-NEB) nebulizer solution 3 mL, 3 mL, Nebulization, Q6H - RT, Veronica Bejarano APRN, 3 mL at 06/09/25 0007    losartan (COZAAR) tablet 100 mg, 100 mg, Oral, Daily, Veronica Bejarano APRN, 100 mg at 06/09/25 0803    melatonin tablet 5 mg, 5 mg, Oral, Nightly PRN, Veronica Bejarano APRN    nitroglycerin (NITROSTAT) SL tablet 0.4 mg, 0.4 mg, Sublingual, Q5 Min PRN, Veronica Bejarano APRN    ondansetron ODT (ZOFRAN-ODT) disintegrating tablet 4 mg, 4 mg, Oral, Q6H PRN **OR** ondansetron (ZOFRAN) injection 4 mg, 4 mg, Intravenous, Q6H PRN, Veronica Bejarano APRN    predniSONE (DELTASONE) tablet 40 mg, 40 mg, Oral, Daily With Breakfast, Veronica Bejarano, APRN, 40 mg at 06/09/25 0803    sodium chloride 0.9 % flush 10 mL, 10 mL, Intravenous, PRN, Veronica Bejarano M, APRN    sodium chloride 0.9 % flush 10 mL, 10 mL, Intravenous, Q12H, Darci, Veronica M, APRN, 10 mL at 06/08/25 2021    sodium chloride 0.9 % flush 10 mL, 10 mL, Intravenous, PRN, Veronica Bejarano M, APRN    sodium chloride 0.9 % infusion 40 mL, 40 mL, Intravenous, PRN, Vibshama, Veronica M, APRN    Antibiotics:  Anti-Infectives (From admission, onward)      Ordered     Dose/Rate  Route Frequency Start Stop    25 1030  doxycycline (MONODOX) 100 MG capsule        Ordering Provider: Zheng Stevenson MD    100 mg Oral Every 12 Hours Scheduled 25 0000 25 2359    25 1145  doxycycline (MONODOX) capsule 100 mg        Ordering Provider: Gaetano Parra PA    100 mg Oral Every 12 Hours Scheduled 25 1245 25 0859    25 203  azithromycin (ZITHROMAX) 500 mg in sodium chloride 0.9 % 250 mL IVPB-VTB  Status:  Discontinued        Ordering Provider: Veronica Bejarano APRN    500 mg  over 60 Minutes Intravenous Daily 25 0900 25 1145    25  ampicillin-sulbactam (UNASYN) 3,000 mg in sodium chloride 0.9 % 100 mL MBP  Status:  Discontinued        Ordering Provider: Veronica Bejarano APRN    3,000 mg  over 30 Minutes Intravenous Every 6 Hours 25 2200 25 1145            Review of Systems:  See HPI      Physical Exam:   Vital Signs  Temp (24hrs), Av.9 °F (36.6 °C), Min:97.8 °F (36.6 °C), Max:98 °F (36.7 °C)    Temp  Min: 97.8 °F (36.6 °C)  Max: 98 °F (36.7 °C)  BP  Min: 130/57  Max: 170/66  Pulse  Min: 66  Max: 84  Resp  Min: 16  Max: 18  SpO2  Min: 88 %  Max: 95 %    GENERAL: Awake and alert, in no acute distress.   HEENT: Normocephalic, atraumatic.  PERRL. EOMI. No conjunctival injection. No icterus. Oropharynx clear without evidence of thrush or exudate.   NECK: Supple.  HEART: RRR; No murmur, rubs, gallops.   LUNGS: Diminished at bases but no wheezes or rales appreciated.   ABDOMEN: Soft, nontender, nondistended. No rebound or guarding.  EXT:  No cyanosis  or edema.  :  Without Jimenes catheter.  MSK: No joint effusions or erythema  SKIN: Warm and dry without cutaneous eruptions on Inspection/palpation.    NEURO: Oriented to PPT.  Motor 5/5 strength  PSYCHIATRIC: anxious     Laboratory Data    Results from last 7 days   Lab Units 25  0405 25  1101   WBC 10*3/mm3 9.88 8.13   HEMOGLOBIN g/dL 10.3* 11.2*   HEMATOCRIT % 32.5*  35.4   PLATELETS 10*3/mm3 234 261     Results from last 7 days   Lab Units 06/07/25  0405   SODIUM mmol/L 141   POTASSIUM mmol/L 4.1   CHLORIDE mmol/L 103   CO2 mmol/L 22.0   BUN mg/dL 19.8   CREATININE mg/dL 1.07*   GLUCOSE mg/dL 192*   CALCIUM mg/dL 9.2     Results from last 7 days   Lab Units 06/06/25  1101   ALK PHOS U/L 82   BILIRUBIN mg/dL 0.5   ALT (SGPT) U/L 12   AST (SGOT) U/L 17             Results from last 7 days   Lab Units 06/06/25  1101   LACTATE mmol/L 1.6             Estimated Creatinine Clearance (by C-G formula based on SCr of 1.07 mg/dL (H))  Female: 52.7 mL/min (A)  Male: 64.5 mL/min (A)      Microbiology:  Microbiology Results (last 10 days)       Procedure Component Value - Date/Time    Respiratory Panel PCR w/COVID-19(SARS-CoV-2) UMAIR/NIRU/SOPHIA/PAD/COR/IBIS In-House, NP Swab in UTM/VTM, 2 HR TAT - Swab, Nasopharynx [[id number]]  (Normal) Collected: 06/06/25 1118    Lab Status: Final result Specimen: Swab from Nasopharynx Updated: 06/06/25 1212     ADENOVIRUS, PCR Not Detected     Coronavirus 229E Not Detected     Coronavirus HKU1 Not Detected     Coronavirus NL63 Not Detected     Coronavirus OC43 Not Detected     COVID19 Not Detected     Human Metapneumovirus Not Detected     Human Rhinovirus/Enterovirus Not Detected     Influenza A PCR Not Detected     Influenza B PCR Not Detected     Parainfluenza Virus 1 Not Detected     Parainfluenza Virus 2 Not Detected     Parainfluenza Virus 3 Not Detected     Parainfluenza Virus 4 Not Detected     RSV, PCR Not Detected     Bordetella pertussis pcr Not Detected     Bordetella parapertussis PCR Not Detected     Chlamydophila pneumoniae PCR Not Detected     Mycoplasma pneumo by PCR Not Detected    Narrative:      In the setting of a positive respiratory panel with a viral infection PLUS a negative procalcitonin without other underlying concern for bacterial infection, consider observing off antibiotics or discontinuation of antibiotics and continue supportive  care. If the respiratory panel is positive for atypical bacterial infection (Bordetella pertussis, Chlamydophila pneumoniae, or Mycoplasma pneumoniae), consider antibiotic de-escalation to target atypical bacterial infection.                  Radiology:  Imaging Results (Last 72 Hours)       Procedure Component Value Units Date/Time    CT Angiogram Chest Pulmonary Embolism [[id number]] Collected: 06/06/25 1522     Updated: 06/06/25 1540    Narrative:      CT ANGIOGRAM CHEST PULMONARY EMBOLISM    Date of Exam: 6/6/2025 2:58 PM EDT    Indication: Pulmonary Embolism.    Comparison: CT chest 3/29/2025    Technique: Axial CT images were obtained of the chest after the uneventful intravenous administration of 85 mL Isovue-370 utilizing pulmonary embolism protocol.  In addition, a 3-D volume rendered image was created for interpretation.  Reconstructed   coronal and sagittal images were also obtained. Automated exposure control and iterative construction methods were used.      Findings:  Normal appearance of the pulmonary arteries without evidence of pulmonary embolism. There is atherosclerosis of the descending thoracic aorta and aortic arch. No pericardial effusion. There are coronary artery calcifications. No thoracic adenopathy.   Chest wall soft tissues are unremarkable. The trachea and mainstem bronchi are patent. The smaller peripheral airways are unremarkable. Redemonstration of numerous centrilobular and subpleural clusters of tree-in-bud nodularity and mostly subcentimeter   nodularity in both lungs. This overall appears improved although some of the nodules appear slightly increased compared to prior, for example a triangular subpleural 10 mm nodule in the lateral right lower lobe (series 4 image 69), previously measuring   approximately 7 mm. Redemonstration of confluent subpleural nodularity at the posterior right lung base (series 4 image 90). No pleural effusion. No pneumothorax. No acute or suspicious bony  findings. There is a loop recorder device in the subcutaneous   tissues of the left anterior chest. Unremarkable appearance of the partially imaged upper abdomen.      Impression:      Impression:  No evidence of pulmonary embolism.      Redemonstration of numerous centrilobular and subpleural clusters of tree-in-bud nodularity and mostly subcentimeter nodularity in both lungs. This appears overall improved although some of the nodules appear slightly increased compared to prior. This   appearance is most compatible with chronic infectious or inflammatory process.        Electronically Signed: August King MD    6/6/2025 3:37 PM EDT    Workstation ID: [id number]    XR Chest 1 View [[id number]] Collected: 06/06/25 1158     Updated: 06/06/25 1203    Narrative:      XR CHEST 1 VW    Date of Exam: 6/6/2025 11:16 AM EDT    Indication: SOA triage protocol    Comparison: 3/19/2025    Findings:  Heart size is at the upper limits of normal. Pulmonary vessels are normal. There is improving airspace disease within the right lung. Left lung is clear. There are no pleural effusions. No pneumothorax.      Impression:      Impression:    1. Improving nodular right-sided airspace disease. No new or worsening airspace consolidation. No pleural effusion.      Electronically Signed: Golden Camargo MD    6/6/2025 12:00 PM EDT    Workstation ID: [id number]           I read her radiographic images.      Impression:     1.  Pulmonary DESMOND-her therapy has been complicated by her requirement for Eliquis therapy which interacts with rifampin.  Ideally, we would have her on azithromycin, ethambutol, and rifampin.  I utilize a regimen of azithromycin, ethambutol, and nebulized liposomal amikacin due to her contraindication for rifampin therapy.  I have now discussed her complex situation in detail and repeatedly with Dr. Wilkins today.  He will plan to transition her anticoagulation at least transiently to Lovenox.  This should allow us to  transition her anti-DESMOND regimen to azithromycin/ethambutol/rifampin.  I investigated the possibility of enrolling her in  be clofazimine research trial.  I discussed this with her today.  This should not be necessary if her ethambutol is discontinued.  2.  COPD-with acute exacerbation.  This appears to have been exacerbated by her liposomal nebulized amikacin.  3.  Acute hypoxic respiratory failure  4.  Chronic DVT-on Eliquis therapy.  She will be seen by Dr. Wilkins today to assess her need for ongoing anticoagulation    PLAN/RECOMMENDATIONS:   1.  Hematology consultation with Dr. Wilkins.  I have discussed her complex situation with Dr. Wilkins and he will see her this afternoon prior to discharge.  2.  Discontinue nebulized liposomal amikacin  3.  Continue therapy for COPD exacerbation  4.  Azithromycin/ethambutol/rifampin as her new regimen for DESMOND    I discussed her complex situation repeatedly with Dr. Stevenson today.  I discussed her complex situation repeatedly with Dr. Wilkins today.  I discussed her complex situation with Dr. Zaragoza today.  I coordinated her care.    This visit included the following complex service elements:  Complex medical decision-making associated with antimicrobial prescribing.  In-depth chart review with high level synthesis for complex diagnoses.  Managed infection treatment protocol associated with transitions of care for this complex patient.    Adrian Segovia MD  6/9/2025  08:48 EDT

## 2025-06-10 ENCOUNTER — LAB (OUTPATIENT)
Dept: LAB | Facility: HOSPITAL | Age: 70
End: 2025-06-10
Payer: COMMERCIAL

## 2025-06-10 ENCOUNTER — TRANSITIONAL CARE MANAGEMENT TELEPHONE ENCOUNTER (OUTPATIENT)
Dept: CALL CENTER | Facility: HOSPITAL | Age: 70
End: 2025-06-10
Payer: COMMERCIAL

## 2025-06-10 ENCOUNTER — TRANSCRIBE ORDERS (OUTPATIENT)
Dept: LAB | Facility: HOSPITAL | Age: 70
End: 2025-06-10
Payer: COMMERCIAL

## 2025-06-10 DIAGNOSIS — Z79.2 ENCOUNTER FOR LONG-TERM (CURRENT) USE OF ANTIBIOTICS: ICD-10-CM

## 2025-06-10 DIAGNOSIS — A31.0 PULMONARY DISEASE DUE TO MYCOBACTERIA: ICD-10-CM

## 2025-06-10 DIAGNOSIS — Z79.2 ENCOUNTER FOR LONG-TERM (CURRENT) USE OF ANTIBIOTICS: Primary | ICD-10-CM

## 2025-06-10 DIAGNOSIS — I82.90 THROMBOTIC INFARCTION: ICD-10-CM

## 2025-06-10 LAB
ALBUMIN SERPL-MCNC: 4.2 G/DL (ref 3.5–5.2)
ALBUMIN/GLOB SERPL: 1.5 G/DL
ALP SERPL-CCNC: 85 U/L (ref 39–117)
ALT SERPL W P-5'-P-CCNC: 12 U/L (ref 1–33)
ANION GAP SERPL CALCULATED.3IONS-SCNC: 9 MMOL/L (ref 5–15)
AST SERPL-CCNC: 13 U/L (ref 1–32)
BASOPHILS # BLD AUTO: 0.08 10*3/MM3 (ref 0–0.2)
BASOPHILS NFR BLD AUTO: 0.5 % (ref 0–1.5)
BILIRUB SERPL-MCNC: 0.3 MG/DL (ref 0–1.2)
BUN SERPL-MCNC: 30.4 MG/DL (ref 8–23)
BUN/CREAT SERPL: 26 (ref 7–25)
CALCIUM SPEC-SCNC: 9.5 MG/DL (ref 8.6–10.5)
CHLORIDE SERPL-SCNC: 104 MMOL/L (ref 98–107)
CO2 SERPL-SCNC: 30 MMOL/L (ref 22–29)
CREAT SERPL-MCNC: 1.17 MG/DL (ref 0.57–1)
CRP SERPL-MCNC: <0.3 MG/DL (ref 0–0.5)
DEPRECATED RDW RBC AUTO: 47.2 FL (ref 37–54)
EGFRCR SERPLBLD CKD-EPI 2021: 50.6 ML/MIN/1.73
EOSINOPHIL # BLD AUTO: 0.35 10*3/MM3 (ref 0–0.4)
EOSINOPHIL NFR BLD AUTO: 2.3 % (ref 0.3–6.2)
ERYTHROCYTE [DISTWIDTH] IN BLOOD BY AUTOMATED COUNT: 15.4 % (ref 12.3–15.4)
ERYTHROCYTE [SEDIMENTATION RATE] IN BLOOD: 20 MM/HR (ref 0–30)
GLOBULIN UR ELPH-MCNC: 2.8 GM/DL
GLUCOSE SERPL-MCNC: 96 MG/DL (ref 65–99)
HCT VFR BLD AUTO: 37.4 % (ref 34–46.6)
HGB BLD-MCNC: 11.5 G/DL (ref 12–15.9)
IMM GRANULOCYTES # BLD AUTO: 0.16 10*3/MM3 (ref 0–0.05)
IMM GRANULOCYTES NFR BLD AUTO: 1 % (ref 0–0.5)
LYMPHOCYTES # BLD AUTO: 4.57 10*3/MM3 (ref 0.7–3.1)
LYMPHOCYTES NFR BLD AUTO: 29.5 % (ref 19.6–45.3)
MCH RBC QN AUTO: 26 PG (ref 26.6–33)
MCHC RBC AUTO-ENTMCNC: 30.7 G/DL (ref 31.5–35.7)
MCV RBC AUTO: 84.6 FL (ref 79–97)
MONOCYTES # BLD AUTO: 0.9 10*3/MM3 (ref 0.1–0.9)
MONOCYTES NFR BLD AUTO: 5.8 % (ref 5–12)
NEUTROPHILS NFR BLD AUTO: 60.9 % (ref 42.7–76)
NEUTROPHILS NFR BLD AUTO: 9.44 10*3/MM3 (ref 1.7–7)
NRBC BLD AUTO-RTO: 0 /100 WBC (ref 0–0.2)
PLATELET # BLD AUTO: 309 10*3/MM3 (ref 140–450)
PMV BLD AUTO: 9.8 FL (ref 6–12)
POTASSIUM SERPL-SCNC: 3.8 MMOL/L (ref 3.5–5.2)
PROT SERPL-MCNC: 7 G/DL (ref 6–8.5)
RBC # BLD AUTO: 4.42 10*6/MM3 (ref 3.77–5.28)
SODIUM SERPL-SCNC: 143 MMOL/L (ref 136–145)
WBC NRBC COR # BLD AUTO: 15.5 10*3/MM3 (ref 3.4–10.8)

## 2025-06-10 PROCEDURE — 86140 C-REACTIVE PROTEIN: CPT

## 2025-06-10 PROCEDURE — 36415 COLL VENOUS BLD VENIPUNCTURE: CPT

## 2025-06-10 PROCEDURE — 80053 COMPREHEN METABOLIC PANEL: CPT

## 2025-06-10 PROCEDURE — 85025 COMPLETE CBC W/AUTO DIFF WBC: CPT

## 2025-06-10 PROCEDURE — 85652 RBC SED RATE AUTOMATED: CPT

## 2025-06-10 NOTE — OUTREACH NOTE
Prep Survey      Flowsheet Row Responses   [facility] patient discharged from? [place]   Is LACE score < 7 ? Yes   Eligibility [facility]   Date of Admission 06/06/25   Date of Discharge 06/09/25   Discharge Disposition Home or Self Care   Discharge diagnosis dyspnea,   COPD with acute exacerbation   Does the patient have one of the following disease processes/diagnoses(primary or secondary)? COPD   Does the patient have Home health ordered? No   Is there a DME ordered? Yes   What DME was ordered? O2 1L NC with exertion   Prep survey completed? Yes            Estelle ODOM - Registered Nurse

## 2025-06-10 NOTE — PROGRESS NOTES
"          Clinical Nutrition Assessment     Patient Name: Agustina Reyes  YOB: 1955  MRN: [id number]  Date of Encounter: 06/09/25 22:12 EDT  Admission date: 6/6/2025  Reason for Visit: MST score 2+    Assessment   Nutrition Assessment   Admission Diagnosis:  Dyspnea [R06.00]    Problem List:    Dyspnea    Hypertension    Hyperlipidemia    History of CVA (cerebrovascular accident)    Prediabetes    Mycobacterium intracellulare infection    COPD exacerbation    Medication reaction    Chronic deep vein thrombosis (DVT)      PMH:   She  has a past medical history of Cataract (2017), COPD (chronic obstructive pulmonary disease) (May2020), COVID-19 virus infection (12/30/2024), Deep vein thrombosis (April2024), GERD (gastroesophageal reflux disease), Hyperlipidemia, Hypertension, Migraines, Occipital neuralgia of left side, Pneumonia, PONV (postoperative nausea and vomiting), Shingles, Stroke (2014), and Umbilical hernia.    PSH:  She  has a past surgical history that includes Cataract extraction (Bilateral); Hysterectomy (2000); Colonoscopy; Subtotal Hysterectomy; Foot surgery (Left); and Bronchoscopy (N/A, 8/19/2024).    Applicable Nutrition History:       Anthropometrics     Height: Height: 167.6 cm (66\")  Last Filed Weight: Weight: 79.4 kg (175 lb) (06/06/25 1051)  Method: Weight Method: Standing scale  BMI: BMI (Calculated): 28.3    UBW:  Per EMR wt of 191 lbs on 3/19/25   Weight change: Has had significant loss of 16 lbs 8% body wt over 3 mo; however pt allows this is due at least in part to changing for diet for preDM and an appropriate loss of wt for her.    Nutrition Focused Physical Exam    Date: 6/9    Pt does not meet criteria for malnutrition diagnosis, at this time.      Subcutaneous fat: MILD  Orbital Mild   Triceps/Biceps No fat loss identified   Thoracic and lumbar region- ribs lower back, midaxillary line Mild     Muscle:  Temple/temporalis Mild   Clavicle- pectoralis, deltoid, " trapezius Mild   Clavicle and acromion process  No muscle loss identified   Scapular bone region No muscle loss identified   Dorsal hand Mild   Patellar  No muscle loss identified   Quadriceps No muscle loss identified   Calf Moderate       Subjective   Reported/Observed/Food/Nutrition Related History:     6/9  Pt allows wt loss in part r/t diet change, eating fewer carbs. Allows wt loss if welcome.     Current Nutrition Prescription   PO: Diet: Cardiac, Diabetic; Healthy Heart (2-3 Na+); Consistent Carbohydrate; Fluid Consistency: Thin (IDDSI 0)  Oral Nutrition Supplement:   Intake: 3 Days: 83% x 6 meals recorded    Assessment & Plan   Nutrition Diagnosis   Date:  6/9            Updated:    Problem No nutrition diagnosis at this time    Etiology    Signs/Symptoms    Status:       Goal / Objectives:   Nutrition to support treatment and Maintain intake      Nutrition Intervention      Follow treatment progress, Care plan reviewed, Advise alternate selection, Menu provided    Menu provided for use if adm extended.  Follow per protocol is adm extended    Monitoring/Evaluation:   Per protocol, I&O, PO intake, Pertinent labs, Weight, Symptoms    eJssica Dela Cruz RD  Time Spent: 30 min

## 2025-06-10 NOTE — OUTREACH NOTE
Call Center TCM Note      Flowsheet Row Responses   [facility] patient discharged from? [place]   Does the patient have one of the following disease processes/diagnoses(primary or secondary)? COPD   TCM attempt successful? Yes   Call start time 1125   Call end time 1131   Discharge diagnosis dyspnea,   COPD with acute exacerbation   Meds reviewed with patient/caregiver? Yes   Is the patient having any side effects they believe may be caused by any medication additions or changes? No   Does the patient have all medications ordered at discharge? Yes   Is the patient taking all medications as directed (includes completed medication regime)? Yes   Comments HOSP DC FU appt 6/17/25 1 pm.   Does the patient have an appointment with their PCP within 7-14 days of discharge? Yes   Has home health visited the patient within 72 hours of discharge? N/A   DME interventions Other   DME comments Pt states she has portable o2 and that the concentrator will be delivered today,.  Pt has number to call, declined for this RN to call [employer].   Pulse Ox monitoring Intermittent   Pulse Ox device source Patient   O2 Sat comments Sats 92-93 %   O2 Sat: education provided Sat levels, Monitoring frequency, When to seek care   Psychosocial issues? No   Did the patient receive a copy of their discharge instructions? Yes   Nursing interventions Reviewed instructions with patient   What is the patient's perception of their health status since discharge? Improving   If the patient is a current smoker, are they able to teach back resources for cessation? Not a smoker   Is the patient/caregiver able to teach back the hierarchy of who to call/visit for symptoms/problems? PCP, Specialist, Home health nurse, Urgent Care, ED, 911 Yes   TCM call completed? Yes   Wrap up additional comments Pt reports some improvement. Pt had an appt today with Dr. Rose aware of PCP appt. Denies needs.   Call end time 1131            SANTOS RODRÍGUEZ - Registered  Nurse    6/10/2025, 11:31 EDT

## 2025-06-11 LAB
B2 GLYCOPROT1 IGA SER-ACNC: <9 GPI IGA UNITS (ref 0–25)
B2 GLYCOPROT1 IGG SER-ACNC: <9 GPI IGG UNITS (ref 0–20)
B2 GLYCOPROT1 IGM SER-ACNC: <9 GPI IGM UNITS (ref 0–32)
CARDIOLIPIN IGG SER IA-ACNC: <9 GPL U/ML (ref 0–14)
CARDIOLIPIN IGM SER IA-ACNC: <9 MPL U/ML (ref 0–12)
F5 GENE MUT ANL BLD/T: NORMAL
FACTOR II, DNA ANALYSIS: NORMAL
QT INTERVAL: 364 MS
QT INTERVAL: 378 MS
QTC INTERVAL: 414 MS
QTC INTERVAL: 430 MS

## 2025-06-12 LAB
APTT SCREEN TO CONFIRM RATIO: 1.05 RATIO (ref 0–1.34)
CONFIRM APTT/NORMAL: 38 SEC (ref 0–47.6)
DRVVT SCREEN TO CONFIRM RATIO: 0.9 RATIO (ref 0.8–1.2)
LA 2 SCREEN W REFLEX-IMP: ABNORMAL
MIXING DRVVT: 44.4 SEC (ref 0–40.4)
SCREEN APTT: 32.1 SEC (ref 0–43.5)
SCREEN DRVVT: 47.7 SEC (ref 0–47)
THROMBIN TIME: 23.8 SEC (ref 0–23)

## 2025-06-13 LAB
AT III ACT/NOR PPP CHRO: 166 % (ref 75–135)
PROT C ACT/NOR PPP CHRO: 129 % (ref 73–180)
PROT C AG ACT/NOR PPP IA: 136 % (ref 60–150)
PROT S ACT/NOR PPP: 104 % (ref 63–140)
PROT S AG ACT/NOR PPP IA: 75 % (ref 60–150)
PROT S FREE AG ACT/NOR PPP IA: 127 % (ref 61–136)

## 2025-06-17 ENCOUNTER — OFFICE VISIT (OUTPATIENT)
Dept: INTERNAL MEDICINE | Facility: CLINIC | Age: 70
End: 2025-06-17
Payer: COMMERCIAL

## 2025-06-17 VITALS
HEART RATE: 76 BPM | HEIGHT: 66 IN | DIASTOLIC BLOOD PRESSURE: 70 MMHG | TEMPERATURE: 98 F | SYSTOLIC BLOOD PRESSURE: 128 MMHG | WEIGHT: 172 LBS | BODY MASS INDEX: 27.64 KG/M2 | OXYGEN SATURATION: 93 %

## 2025-06-17 DIAGNOSIS — E11.9 TYPE 2 DIABETES MELLITUS WITHOUT COMPLICATION, WITHOUT LONG-TERM CURRENT USE OF INSULIN: ICD-10-CM

## 2025-06-17 DIAGNOSIS — J44.1 COPD EXACERBATION: ICD-10-CM

## 2025-06-17 DIAGNOSIS — Z09 HOSPITAL DISCHARGE FOLLOW-UP: Primary | ICD-10-CM

## 2025-06-17 DIAGNOSIS — R06.02 SHORTNESS OF BREATH: ICD-10-CM

## 2025-06-17 LAB
EXPIRATION DATE: ABNORMAL
HBA1C MFR BLD: 6 % (ref 4.5–5.7)
Lab: ABNORMAL

## 2025-06-17 NOTE — PROGRESS NOTES
Transitional Care Follow Up Visit  Subjective     Agustina Reyes is a 69 y.o. adult who presents for a transitional care management visit.    Within 48 business hours after discharge our office contacted her via telephone to coordinate her care and needs.      I reviewed and discussed the details of that call along with the discharge summary, hospital problems, inpatient lab results, inpatient diagnostic studies, and consultation reports with Agustina.     Current outpatient and discharge medications have been reconciled for the patient.  Reviewed by: JAYNE Gurrola          6/9/2025     9:08 PM   Date of TCM Phone Call   [facility]   Date of Admission 6/6/2025   Date of Discharge 6/9/2025   Discharge Disposition Home or Self Care     Risk for Readmission (LACE) Score: 6 (6/9/2025  6:00 AM)      History of Present Illness   The patient was admitted to the hospital from Friday to late Monday night (6/6-9) due to complications related to her medication regimen. She was initially taking ARIKAYCE, which led to breathing difficulties and her hospitalization. During her stay, her treatment was adjusted to include rifampin, ethambutol, and azithromycin, as recommended by a infectious disease doctor. Additionally, her anticoagulation therapy was switched from Eliquis to Lovenox. The patient has expressed concerns about bruising at her Lovenox injection site and plans to consult with hematology in July to discuss discontinuing Lovenox.    During her hospital stay, the patient experienced clear lung sounds but noted wheezing in her upper airway and shortness of breath.  She was discharged and instructed to continue her Breztri inhaler and she began aching albuterol.  She is currently using these treatments twice daily. The patient is eager to return to work and has a pulmonology appointment scheduled for tomorrow to discuss her COPD medication regimen.  She states that currently she feels as if  "it is not controlling her symptoms.    The patient reported using supplemental oxygen on the first or second day after exerting herself by carrying flower pots. At home, her pulse oximetry readings have been around 93%. She has been making efforts to walk and improve her stamina.    The patient is also concerned about anemia, as she was informed by her hospital physician that she is anemic. She has lost 30 pounds and is actively managing her diabetes, with a blood sugar reading of 91 this morning. She attended a diabetes education class and has since modified her eating habits.  She reports that she has not started to take her pioglitazone.  Her blood pressure was elevated on the initial check, however it is now 128/70.  She denies monitoring her blood pressure at home.     The following portions of the patient's history were reviewed and updated as appropriate: allergies, current medications, past family history, past medical history, past social history, past surgical history, and problem list.    Review of Systems    Objective   /70   Pulse 76   Temp 98 °F (36.7 °C) (Infrared)   Ht 167.6 cm (65.98\")   Wt 78 kg (172 lb)   SpO2 93%   BMI 27.77 kg/m²   Physical Exam  Constitutional:       General: She is not in acute distress.     Appearance: Normal appearance. She is not ill-appearing.   HENT:      Head: Normocephalic.   Musculoskeletal:         General: Normal range of motion.      Cervical back: Normal range of motion.   Skin:     General: Skin is warm and dry.   Neurological:      Mental Status: She is alert and oriented to person, place, and time. Mental status is at baseline.   Psychiatric:         Mood and Affect: Mood normal.         Assessment & Plan   Problems Addressed this Visit       Shortness of breath    COPD exacerbation     Other Visit Diagnoses         Hospital discharge follow-up    -  Primary      Type 2 diabetes mellitus without complication, without long-term current use of insulin  "       Relevant Orders    POC Glycosylated Hemoglobin (Hb A1C) (Completed)          Diagnoses         Codes Comments      Hospital discharge follow-up    -  Primary ICD-10-CM: Z09  ICD-9-CM: V67.59       COPD exacerbation     ICD-10-CM: J44.1  ICD-9-CM: 491.21       Shortness of breath     ICD-10-CM: R06.02  ICD-9-CM: 786.05       Type 2 diabetes mellitus without complication, without long-term current use of insulin     ICD-10-CM: E11.9  ICD-9-CM: 250.00             -Discussed possible adjustments to her COPD medications, however she is following up with her pulmonologist tomorrow and would like for them to make it to her medications.    - Encouraged patient to continue checking her oxygen saturations with her pulse ox and to optimize her albuterol use.    -After discussing her home blood sugars, I ordered a POC hemoglobin A1c.  This has decreased from 6.5 to 6.  I encouraged her to continue her new dietary habits and that we could recheck in another 3 months.  We discussed her prescription for pioglitazone and the decision was made to discontinue this med for the time being.  I have advised her to reach out to the clinic if she notices an increase in her blood sugar.

## 2025-06-18 ENCOUNTER — OFFICE VISIT (OUTPATIENT)
Age: 70
End: 2025-06-18
Payer: COMMERCIAL

## 2025-06-18 VITALS
SYSTOLIC BLOOD PRESSURE: 118 MMHG | HEART RATE: 84 BPM | OXYGEN SATURATION: 94 % | WEIGHT: 174 LBS | BODY MASS INDEX: 27.97 KG/M2 | TEMPERATURE: 97 F | HEIGHT: 66 IN | DIASTOLIC BLOOD PRESSURE: 58 MMHG

## 2025-06-18 DIAGNOSIS — J44.9 STAGE 2 MODERATE COPD BY GOLD CLASSIFICATION: Primary | ICD-10-CM

## 2025-06-18 DIAGNOSIS — J30.89 NON-SEASONAL ALLERGIC RHINITIS, UNSPECIFIED TRIGGER: ICD-10-CM

## 2025-06-18 DIAGNOSIS — A31.0 MYCOBACTERIUM INTRACELLULARE INFECTION: ICD-10-CM

## 2025-06-18 PROCEDURE — 99214 OFFICE O/P EST MOD 30 MIN: CPT | Performed by: NURSE PRACTITIONER

## 2025-06-18 RX ORDER — ETHAMBUTOL HYDROCHLORIDE 100 MG/1
100 TABLET, FILM COATED ORAL DAILY
COMMUNITY

## 2025-06-18 RX ORDER — RIFAMPIN 300 MG/1
300 CAPSULE ORAL
COMMUNITY

## 2025-06-18 RX ORDER — AZITHROMYCIN 500 MG/1
TABLET, FILM COATED ORAL
COMMUNITY
Start: 2025-06-17

## 2025-06-18 RX ORDER — IPRATROPIUM BROMIDE AND ALBUTEROL SULFATE 2.5; .5 MG/3ML; MG/3ML
3 SOLUTION RESPIRATORY (INHALATION) 4 TIMES DAILY PRN
Qty: 120 ML | Refills: 5 | Status: SHIPPED | OUTPATIENT
Start: 2025-06-18

## 2025-06-18 NOTE — PROGRESS NOTES
[facility] Pulmonary Follow up    CHIEF COMPLAINT    cough    HISTORY OF PRESENT ILLNESS    Agustina Reyes is a 69 y.o.adult here today for follow-up.  She was last seen in the office by Dr. Galicia in May.  She had been doing fairly well.  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  She was referred to infectious disease.  She was started on Arikayce and azithromycin/ethambutol.  She developed severe shortness of breath on 6/6 and presented to [facility] and was admitted to 6/9 she had a CT scan that actually showed improvement from her previous scan.  It was felt that she was having bronchospasms from the Arikayce and started on p.o./IV steroids and inhaled budesonide with DuoNebs.  She completed her prednisone 2 days ago.    She does feel like her symptoms are improving.  She does continue to have a harsh cough with no sputum production.    She has been off the Arikayce for about a week.  She was discharged home on oxygen with exertion and her oxygen saturations have been 93% off oxygen since she has been home.  She has not used the oxygen for the last few days.    She is using the Breztri twice a day.  She uses the albuterol occasionally.    She states that the cough is strong but nonproductive.  She denies any hemoptysis.  She denies any fever, chills or night sweats.    She is currently off work and wants to discuss when she should go back to work.    She has a history of a chronic DVT and is currently on Lovenox twice a day.  She follows with hematology.    She denies any chest pain or palpitations.  She denies any lower extremity edema or calf tenderness.    She denies any allergy symptoms.    She denies any reflux symptoms.    She quit smoking 10 years ago has a 40-pack-year history.    Patient Active Problem List   Diagnosis    Occipital neuralgia of left side    Hypertension    Migraine with aura and without status migrainosus, not intractable    Hyperlipidemia     Occlusion and stenosis of left posterior cerebral artery    History of CVA (cerebrovascular accident)    Prediabetes    Stage 2 moderate COPD by GOLD classification    Abnormal CT of the chest    Non-seasonal allergic rhinitis    Gastroesophageal reflux disease with esophagitis without hemorrhage    Incidental lung nodule, greater than or equal to 8mm    Shortness of breath    Fever and chills    Recurrent epistaxis    Mycobacterium intracellulare infection    Dyspnea    COPD exacerbation    Medication reaction    Chronic deep vein thrombosis (DVT)       Allergies   Allergen Reactions    Erythromycin GI Intolerance     Nausea      Amlodipine Other (See Comments)     Pt states she does not remember an allergy to this medicine, states her legs were swelling    Lisinopril Other (See Comments)     Joint pain    Sulfa Antibiotics Unknown (See Comments)     Pt's home pharmacy has sulfa drugs listed as an allergy.       Current Outpatient Medications:     albuterol sulfate  (90 Base) MCG/ACT inhaler, Inhale 2 puffs Every 4 (Four) Hours As Needed for Wheezing or Shortness of Air., Disp: 8.5 g, Rfl: 0    aspirin 81 MG tablet, Take 1 tablet by mouth Daily., Disp: , Rfl:     atorvastatin (LIPITOR) 40 MG tablet, Take 1 tablet by mouth Daily., Disp: 90 tablet, Rfl: 3    azithromycin (ZITHROMAX) 500 MG tablet, , Disp: , Rfl:     Budeson-Glycopyrrol-Formoterol (Breztri Aerosphere) 160-9-4.8 MCG/ACT aerosol inhaler, INHALE 2 PUFFS BY MOUTH TWICE DAILY, Disp: 3 each, Rfl: 3    butalbital-acetaminophen  MG tablet tablet, Take 1-2 tablets by mouth Every 6 (Six) Hours As Needed (migraines)., Disp: 15 each, Rfl: 0    chlorthalidone (HYGROTON) 25 MG tablet, TAKE 1 TABLET BY MOUTH DAILY, Disp: 90 tablet, Rfl: 3    enoxaparin sodium (LOVENOX) 80 MG/0.8ML solution prefilled syringe syringe, Inject 0.8 mL under the skin into the appropriate area as directed Every 12 (Twelve) Hours. Indications: history of DVT/PE, Disp: , Rfl:      ethambutol (MYAMBUTOL) 100 MG tablet, Take 1 tablet by mouth Daily., Disp: , Rfl:     Hydrocod Aly-Chlorphe Aly ER (TUSSIONEX PENNKINETIC) 10-8 MG/5ML ER suspension, TAKE 5ML BY MOUTH EVERY 12 HOURS AS NEEDED FOR COUGH, Disp: , Rfl:     ipratropium-albuterol (DUO-NEB) 0.5-2.5 mg/3 ml nebulizer, Take 3 mL by nebulization 4 (Four) Times a Day As Needed for Wheezing., Disp: 120 mL, Rfl: 5    losartan (COZAAR) 100 MG tablet, Take 1 tablet by mouth Daily., Disp: 90 tablet, Rfl: 3    prochlorperazine (COMPAZINE) 5 MG tablet, Take 1-2 tablets by mouth Every 6 (Six) Hours As Needed for Nausea or Vomiting (migraine)., Disp: 20 tablet, Rfl: 5    rifAMPin (RIFADIN) 300 MG capsule, Take 1 capsule by mouth., Disp: , Rfl:   MEDICATION LIST AND ALLERGIES REVIEWED.    Social History     Tobacco Use    Smoking status: Former     Current packs/day: 0.00     Average packs/day: 1 pack/day for 40.0 years (40.0 ttl pk-yrs)     Types: Cigarettes     Start date: 8/4/1974     Quit date: 8/4/2014     Years since quitting: 10.8     Passive exposure: Past    Smokeless tobacco: Never   Vaping Use    Vaping status: Never Used   Substance Use Topics    Alcohol use: Yes     Comment: occ.    Drug use: No       FAMILY AND SOCIAL HISTORY REVIEWED.    Review of Systems   Constitutional:  Positive for fatigue. Negative for activity change, appetite change, fever and unexpected weight change.   HENT:  Positive for congestion. Negative for postnasal drip, rhinorrhea, sinus pressure, sore throat and voice change.    Eyes:  Negative for visual disturbance.   Respiratory:  Positive for cough and chest tightness. Negative for shortness of breath and wheezing.    Cardiovascular:  Negative for chest pain, palpitations and leg swelling.   Gastrointestinal:  Negative for abdominal distention, abdominal pain, nausea and vomiting.   Endocrine: Negative for cold intolerance and heat intolerance.   Genitourinary:  Negative for difficulty urinating and urgency.  "  Musculoskeletal:  Negative for arthralgias, back pain and neck pain.   Skin:  Negative for color change and pallor.   Allergic/Immunologic: Negative for environmental allergies and food allergies.   Neurological:  Negative for dizziness, syncope, weakness and light-headedness.   Hematological:  Negative for adenopathy. Does not bruise/bleed easily.   Psychiatric/Behavioral:  Negative for agitation and behavioral problems.    .    /58   Pulse 84   Temp 97 °F (36.1 °C)   Ht 167.6 cm (65.98\")   Wt 78.9 kg (174 lb)   LMP  (LMP Unknown)   SpO2 94% Comment: Room air at rest  BMI 28.10 kg/m²     Immunization History   Administered Date(s) Administered    COVID-19 (PFIZER) 12YRS+ (COMIRNATY) 10/12/2023    COVID-19 (PFIZER) BIVALENT 12+YRS 10/24/2022    COVID-19 (PFIZER) Purple Cap Monovalent 02/15/2021, 03/09/2021, 03/27/2021, 10/06/2021, 11/22/2021    Fluzone  >6mos 03/10/2019, 08/28/2020    Fluzone High-Dose 65+YRS 10/14/2024    Fluzone High-Dose 65+yrs 10/06/2021, 10/24/2022, 10/12/2023    Fluzone Quad >6mos (Multi-dose) 03/10/2019, 11/22/2021    Pneumococcal, Unspecified 05/05/2021    Shingrix 12/15/2019, 02/09/2020    Tdap 08/17/2023       Physical Exam  Vitals and nursing note reviewed.   Constitutional:       Appearance: She is well-developed. She is not diaphoretic.   HENT:      Head: Normocephalic and atraumatic.   Eyes:      Pupils: Pupils are equal, round, and reactive to light.   Neck:      Thyroid: No thyromegaly.   Cardiovascular:      Rate and Rhythm: Normal rate and regular rhythm.      Heart sounds: Normal heart sounds. No murmur heard.     No friction rub. No gallop.   Pulmonary:      Effort: Pulmonary effort is normal. No respiratory distress.      Breath sounds: Normal breath sounds. No wheezing or rales.   Chest:      Chest wall: No tenderness.   Abdominal:      General: Bowel sounds are normal.      Palpations: Abdomen is soft.      Tenderness: There is no abdominal tenderness. "   Musculoskeletal:         General: No swelling. Normal range of motion.      Cervical back: Normal range of motion and neck supple.   Lymphadenopathy:      Cervical: No cervical adenopathy.   Skin:     General: Skin is warm and dry.      Capillary Refill: Capillary refill takes less than 2 seconds.   Neurological:      Mental Status: She is alert and oriented to person, place, and time.   Psychiatric:         Mood and Affect: Mood normal.         Behavior: Behavior normal.           RESULTS    PROBLEM LIST    Problem List Items Addressed This Visit          Allergies and Adverse Reactions    Non-seasonal allergic rhinitis       Pulmonary and Pneumonias    Stage 2 moderate COPD by GOLD classification - Primary    Relevant Medications    ipratropium-albuterol (DUO-NEB) 0.5-2.5 mg/3 ml nebulizer       Other    Mycobacterium intracellulare infection    Relevant Medications    azithromycin (ZITHROMAX) 500 MG tablet    rifAMPin (RIFADIN) 300 MG capsule    ethambutol (MYAMBUTOL) 100 MG tablet         DISCUSSION    Ms. Reyes was here for follow-up.  She was hospitalized for presumed allergic reaction to Arikayce.  She has been off for about a week.  She does feel like her cough is getting better.  She finished the prednisone 2 days ago.  I did encourage her that if she needs more prednisone we may need to do that if her cough worsens.    For now we are going to continue the Breztri twice a day.  I would add nebulizers to see if this helps with her cough.  I do think that she will get better but is can take a little while for her inflammation and bronchospasms.    She will continue to take her allergy medicine as needed.    She will continue the follow-up for MAC and see infectious disease as scheduled.  She does not need to be put on Arikayce in the future.    I did advise her that she could stay off work as long as she needs.  We will plan for her to return back to work the week of June 29.  I will fill out her FMLA  papers for her once I receive them.    We will schedule her a follow-up in 6 to 8 weeks.    I personally spent a total of 34 minutes on patient visit today including chart review, face to face with the patient obtaining the history and physical exam, review of pertinent images and tests, counseling and discussion and/or coordination of care as described above, and documentation.  Total time excludes time spent on other separate services such as performing procedures or test interpretation, if applicable.        Mago Bowden, APRN  06/18/202512:30 EDT  Electronically signed     Please note that portions of this note were completed with a voice recognition program.        CC: Rich Zaragoza MD

## 2025-07-01 ENCOUNTER — TRANSCRIBE ORDERS (OUTPATIENT)
Dept: LAB | Facility: HOSPITAL | Age: 70
End: 2025-07-01
Payer: COMMERCIAL

## 2025-07-01 ENCOUNTER — LAB (OUTPATIENT)
Dept: LAB | Facility: HOSPITAL | Age: 70
End: 2025-07-01
Payer: COMMERCIAL

## 2025-07-01 DIAGNOSIS — Z79.2 ENCOUNTER FOR LONG-TERM (CURRENT) USE OF ANTIBIOTICS: ICD-10-CM

## 2025-07-01 DIAGNOSIS — A31.0 PULMONARY DISEASE DUE TO MYCOBACTERIA: ICD-10-CM

## 2025-07-01 DIAGNOSIS — Z79.2 ENCOUNTER FOR LONG-TERM (CURRENT) USE OF ANTIBIOTICS: Primary | ICD-10-CM

## 2025-07-01 LAB
ALBUMIN SERPL-MCNC: 4.4 G/DL (ref 3.5–5.2)
ALBUMIN/GLOB SERPL: 1.7 G/DL
ALP SERPL-CCNC: 69 U/L (ref 39–117)
ALT SERPL W P-5'-P-CCNC: 40 U/L (ref 1–33)
ANION GAP SERPL CALCULATED.3IONS-SCNC: 13 MMOL/L (ref 5–15)
AST SERPL-CCNC: 24 U/L (ref 1–32)
BASOPHILS # BLD AUTO: 0.06 10*3/MM3 (ref 0–0.2)
BASOPHILS NFR BLD AUTO: 0.8 % (ref 0–1.5)
BILIRUB SERPL-MCNC: 0.3 MG/DL (ref 0–1.2)
BUN SERPL-MCNC: 14.3 MG/DL (ref 8–23)
BUN/CREAT SERPL: 10.4 (ref 7–25)
CALCIUM SPEC-SCNC: 9.4 MG/DL (ref 8.6–10.5)
CHLORIDE SERPL-SCNC: 95 MMOL/L (ref 98–107)
CO2 SERPL-SCNC: 27 MMOL/L (ref 22–29)
CREAT SERPL-MCNC: 1.38 MG/DL (ref 0.57–1)
CRP SERPL-MCNC: 2.69 MG/DL (ref 0–0.5)
DEPRECATED RDW RBC AUTO: 48.1 FL (ref 37–54)
EGFRCR SERPLBLD CKD-EPI 2021: 41.5 ML/MIN/1.73
EOSINOPHIL # BLD AUTO: 0.29 10*3/MM3 (ref 0–0.4)
EOSINOPHIL NFR BLD AUTO: 3.8 % (ref 0.3–6.2)
ERYTHROCYTE [DISTWIDTH] IN BLOOD BY AUTOMATED COUNT: 15.8 % (ref 12.3–15.4)
ERYTHROCYTE [SEDIMENTATION RATE] IN BLOOD: 50 MM/HR (ref 0–30)
GLOBULIN UR ELPH-MCNC: 2.6 GM/DL
GLUCOSE SERPL-MCNC: 109 MG/DL (ref 65–99)
HCT VFR BLD AUTO: 32.6 % (ref 34–46.6)
HGB BLD-MCNC: 10.5 G/DL (ref 12–15.9)
IMM GRANULOCYTES # BLD AUTO: 0.05 10*3/MM3 (ref 0–0.05)
IMM GRANULOCYTES NFR BLD AUTO: 0.6 % (ref 0–0.5)
LYMPHOCYTES # BLD AUTO: 1.34 10*3/MM3 (ref 0.7–3.1)
LYMPHOCYTES NFR BLD AUTO: 17.4 % (ref 19.6–45.3)
MCH RBC QN AUTO: 27.2 PG (ref 26.6–33)
MCHC RBC AUTO-ENTMCNC: 32.2 G/DL (ref 31.5–35.7)
MCV RBC AUTO: 84.5 FL (ref 79–97)
MONOCYTES # BLD AUTO: 0.48 10*3/MM3 (ref 0.1–0.9)
MONOCYTES NFR BLD AUTO: 6.2 % (ref 5–12)
NEUTROPHILS NFR BLD AUTO: 5.5 10*3/MM3 (ref 1.7–7)
NEUTROPHILS NFR BLD AUTO: 71.2 % (ref 42.7–76)
NRBC BLD AUTO-RTO: 0 /100 WBC (ref 0–0.2)
PLATELET # BLD AUTO: 254 10*3/MM3 (ref 140–450)
PMV BLD AUTO: 9.5 FL (ref 6–12)
POTASSIUM SERPL-SCNC: 4.4 MMOL/L (ref 3.5–5.2)
PROT SERPL-MCNC: 7 G/DL (ref 6–8.5)
RBC # BLD AUTO: 3.86 10*6/MM3 (ref 3.77–5.28)
SODIUM SERPL-SCNC: 135 MMOL/L (ref 136–145)
WBC NRBC COR # BLD AUTO: 7.72 10*3/MM3 (ref 3.4–10.8)

## 2025-07-01 PROCEDURE — 80053 COMPREHEN METABOLIC PANEL: CPT

## 2025-07-01 PROCEDURE — 85652 RBC SED RATE AUTOMATED: CPT

## 2025-07-01 PROCEDURE — 85025 COMPLETE CBC W/AUTO DIFF WBC: CPT

## 2025-07-01 PROCEDURE — 36415 COLL VENOUS BLD VENIPUNCTURE: CPT

## 2025-07-01 PROCEDURE — 86140 C-REACTIVE PROTEIN: CPT

## 2025-07-03 ENCOUNTER — TELEPHONE (OUTPATIENT)
Dept: SLEEP MEDICINE | Age: 70
End: 2025-07-03
Payer: COMMERCIAL

## 2025-07-03 DIAGNOSIS — M54.81 OCCIPITAL NEURALGIA OF LEFT SIDE: Primary | ICD-10-CM

## 2025-07-03 RX ORDER — ENOXAPARIN SODIUM 100 MG/ML
1 INJECTION SUBCUTANEOUS EVERY 12 HOURS
Qty: 48 ML | Refills: 0
Start: 2025-07-03 | End: 2025-07-07 | Stop reason: SDUPTHER

## 2025-07-03 NOTE — TELEPHONE ENCOUNTER
Patient called wanting to know what the status is on her FMLA papers sent 2 weeks ago. Please check on an give patient a call. Needing ASAP. [phone] Patient

## 2025-07-03 NOTE — TELEPHONE ENCOUNTER
"  Caller: Agustina Reyes \"Lorraine\"    Relationship: Self    Best call back number: [phone]    Requested Prescriptions:   Requested Prescriptions     Pending Prescriptions Disp Refills    enoxaparin sodium (LOVENOX) 80 MG/0.8ML solution prefilled syringe syringe       Sig: Inject 0.8 mL under the skin into the appropriate area as directed Every 12 (Twelve) Hours. Indications: history of DVT/PE        Pharmacy where request should be sent: [id number] [place], [place] - [address] - [phone] [facility] [phone] FX     Last office visit with prescribing clinician: Visit date not found   Last telemedicine visit with prescribing clinician: Visit date not found   Next office visit with prescribing clinician: 7/17/2025     Additional details provided by patient: PATIENT HAS 5 DAYS LEFT OF HER LOVENOX. SHE WANTED TO KNOW IF SHE SHOULD CONTINUE THIS, TAPER THIS OFF, OR DISCONTINUE? PLEASE ADVISE ASAP. APPT IS NOT UNTIL 7/17.     Does the patient have less than a 3 day supply:  [] Yes  [x] No    Would you like a call back once the refill request has been completed: [x] Yes [] No    If the office needs to give you a call back, can they leave a voicemail: [x] Yes [] No      "

## 2025-07-07 ENCOUNTER — TELEPHONE (OUTPATIENT)
Dept: ONCOLOGY | Facility: CLINIC | Age: 70
End: 2025-07-07
Payer: COMMERCIAL

## 2025-07-07 DIAGNOSIS — M54.81 OCCIPITAL NEURALGIA OF LEFT SIDE: ICD-10-CM

## 2025-07-07 DIAGNOSIS — I82.511 CHRONIC DEEP VEIN THROMBOSIS (DVT) OF FEMORAL VEIN OF RIGHT LOWER EXTREMITY: Primary | ICD-10-CM

## 2025-07-07 RX ORDER — ENOXAPARIN SODIUM 100 MG/ML
1 INJECTION SUBCUTANEOUS EVERY 12 HOURS
Start: 2025-07-07

## 2025-07-07 NOTE — TELEPHONE ENCOUNTER
"  Caller: Agustina Reyes \"Lorraine\"    Relationship: Self    Best call back number: [phone]    What was the call regarding: PATIENT WANTING TO KNOW IF SHE STILL NEEDS TO TAKE THE LOVENOX, IF SO SHE NEEDS A REFILL. ALSO WOULD NEED TO KNOW HOW TO TAPER OFF THE MEDICATION IS SO     IF NO ANSWER CAN LEAVE MESSAGE  "

## 2025-07-17 ENCOUNTER — OFFICE VISIT (OUTPATIENT)
Dept: ONCOLOGY | Facility: CLINIC | Age: 70
End: 2025-07-17
Payer: COMMERCIAL

## 2025-07-17 ENCOUNTER — LAB (OUTPATIENT)
Dept: LAB | Facility: HOSPITAL | Age: 70
End: 2025-07-17
Payer: COMMERCIAL

## 2025-07-17 ENCOUNTER — TRANSCRIBE ORDERS (OUTPATIENT)
Dept: LAB | Facility: HOSPITAL | Age: 70
End: 2025-07-17
Payer: COMMERCIAL

## 2025-07-17 VITALS
TEMPERATURE: 97.9 F | SYSTOLIC BLOOD PRESSURE: 141 MMHG | DIASTOLIC BLOOD PRESSURE: 56 MMHG | RESPIRATION RATE: 16 BRPM | HEART RATE: 76 BPM | BODY MASS INDEX: 27.48 KG/M2 | WEIGHT: 171 LBS | OXYGEN SATURATION: 96 % | HEIGHT: 66 IN

## 2025-07-17 DIAGNOSIS — I82.5Y1 CHRONIC DEEP VEIN THROMBOSIS (DVT) OF PROXIMAL VEIN OF RIGHT LOWER EXTREMITY: ICD-10-CM

## 2025-07-17 DIAGNOSIS — A31.0 PULMONARY MYCOBACTERIAL INFECTION: ICD-10-CM

## 2025-07-17 DIAGNOSIS — J96.01 ACUTE RESPIRATORY FAILURE WITH HYPOXIA: Primary | ICD-10-CM

## 2025-07-17 DIAGNOSIS — T50.905D ADVERSE EFFECT OF DRUG, SUBSEQUENT ENCOUNTER: ICD-10-CM

## 2025-07-17 DIAGNOSIS — J96.01 ACUTE RESPIRATORY FAILURE WITH HYPOXIA: ICD-10-CM

## 2025-07-17 DIAGNOSIS — I82.5Y1 CHRONIC DEEP VEIN THROMBOSIS (DVT) OF PROXIMAL VEIN OF RIGHT LOWER EXTREMITY: Primary | ICD-10-CM

## 2025-07-17 PROCEDURE — 87206 SMEAR FLUORESCENT/ACID STAI: CPT

## 2025-07-17 PROCEDURE — 81240 F2 GENE: CPT

## 2025-07-17 PROCEDURE — 87116 MYCOBACTERIA CULTURE: CPT

## 2025-07-17 PROCEDURE — 36415 COLL VENOUS BLD VENIPUNCTURE: CPT

## 2025-07-17 RX ORDER — ONDANSETRON 4 MG/1
4 TABLET, ORALLY DISINTEGRATING ORAL EVERY 8 HOURS PRN
COMMUNITY
Start: 2025-07-01 | End: 2025-07-31

## 2025-07-17 NOTE — PROGRESS NOTES
CHIEF COMPLAINT: Chronic DVT in the face of mycobacterial infection with drug interaction with Eliquis    Problem List:  Oncology/Hematology History Overview Note   1.  Chronic DVT  2.  DESMOND in need of rifampin complicated by treatment with DOAC     - 6/9/2025 [facility] inpatient hematology consultation: Patient with remote history of CVA treated temporarily with Plavix then years later July 2023 fractured her left heel accidentally.  Pinning subsequently failed and had multiple surgeries and was immobile for the most part July through November 2023.  Did use sequential compression device on right leg.  Despite this.  March 2024 started having leg swelling.  A month later saw Dr. Zaragoza.  April 2024 Doppler showed subacute right lower extremity deep vein thrombosis proximal femoral and popliteal.  He started on DOAC.  August 2024 Doppler showed chronic DVT unchanged and he kept on Eliquis.  He performed another Doppler March 2025 that still showed the chronic DVT right lower extremity in the proximal femoral and popliteal.  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  My strong suspicion is that the immobilization was her provocation but rather than Cavalierly stopping anticoagulation and trusting that Theory completely, I will stop her Eliquis and place her on subcu Lovenox 1 mg/kg subcu every 12 hours and check her hypercoagulable workup for completeness sake.  I explained to her that even some of these abnormalities if found are not necessarily prove of cause and effect but would shade our decisions down the road as to keeping her on Lovenox for a year and switching back to Eliquis once off the antifungal or whether to simply stop anticoagulation.  I will also get her to see Dr. Penn with whom I have spoken today for vascular ultrasonographic interrogation and monitoring to give us an opinion as to anatomic risks of  cessation given the proximity of the chronic clot.  I think it is virtually certain that this chronic clot will never vanished on anticoagulation and chronic thrombosis by itself is not necessarily a de facto indication for lifetime anticoagulation.  Addendum:  Anticardiolipin IgG IgM/beta-2 glycoprotein IgG IgA IgM/lupus anticoagulant all negative  Antithrombin III functional 166  Protein C antigen 136 activity 129  Protein S antigen free 127, total 75 functional 104  Factor V Leiden negative  Prothrombin gene mutation not drawn     - 7/17/2025 [facility] hematology follow-up: Continues Lovenox while on antimycobacterial therapy.  No hypercoagulable condition but did not get the prothrombin gene mutation drawn unfortunately and I will draw that today.  For the foreseeable future she is going to stay on Lovenox and limiting concern is whether or not the magnitude of her chronic DVT is such that it is going to increase her risk substantially of recurrent clot or we to stop anticoagulation and for that I still need her to get to Dr. Penn with whom I spoke last visit but she has not seen yet.     Chronic deep vein thrombosis (DVT) of proximal vein of right lower extremity   6/6/2025 Initial Diagnosis    Chronic deep vein thrombosis (DVT)         HISTORY OF PRESENT ILLNESS:  The patient is a 69 y.o. adult, here for follow up on management of DVT chronic on Lovenox    Past Medical History:   Diagnosis Date    Allergic rhinitis Na    Cataract 2017    COPD (chronic obstructive pulmonary disease) May2020    COVID-19 virus infection 12/30/2024    Deep vein thrombosis May24    Diabetes mellitus 2/25    GERD (gastroesophageal reflux disease)     Hyperlipidemia     Hypertension     Migraines     Occipital neuralgia of left side     Pneumonia     PONV (postoperative nausea and vomiting)     Shingles     Stroke 2014    no residual effects    Umbilical hernia      Past Surgical History:   Procedure Laterality Date    BRONCHOSCOPY  "N/A 08/19/2024    Procedure: BRONCHOSCOPY WITH BRONCHOALVEOLAR LAVAGE;  Surgeon: Aria Irwin DO;  Location: [facility] ENDOSCOPY;  Service: Pulmonary;  Laterality: N/A;    BRONCHOSCOPY  10/24    CATARACT EXTRACTION Bilateral     COLONOSCOPY      FOOT SURGERY Left     x4    HYSTERECTOMY  2000    SUBTOTAL HYSTERECTOMY         Allergies   Allergen Reactions    Erythromycin GI Intolerance     Nausea      Amlodipine Other (See Comments)     Pt states she does not remember an allergy to this medicine, states her legs were swelling    Lisinopril Other (See Comments)     Joint pain    Sulfa Antibiotics Unknown (See Comments)     Pt's home pharmacy has sulfa drugs listed as an allergy.       Family History and Social History reviewed and changed as necessary    REVIEW OF SYSTEM:   Itching under the skin where she gets bruises and in particular where she injects the Lovenox    PHYSICAL EXAM:  Ecchymoses at sites of trauma only    Vitals:    07/17/25 1445   BP: 141/56   Pulse: 76   Resp: 16   Temp: 97.9 °F (36.6 °C)   SpO2: 96%   Weight: 77.6 kg (171 lb)   Height: 167.6 cm (66\")     Vitals:    07/17/25 1445   PainSc: 0-No pain          ECOG score: 0           Vitals reviewed.    ECOG: (0) Fully Active - Able to Carry On All Pre-disease Performance Without Restriction    Lab Results   Component Value Date    HGB 10.5 (L) 07/01/2025    HCT 32.6 (L) 07/01/2025    MCV 84.5 07/01/2025     07/01/2025    WBC 7.72 07/01/2025    NEUTROABS 5.50 07/01/2025    LYMPHSABS 1.34 07/01/2025    MONOSABS 0.48 07/01/2025    EOSABS 0.29 07/01/2025    BASOSABS 0.06 07/01/2025       Lab Results   Component Value Date    GLUCOSE 109 (H) 07/01/2025    BUN 14.3 07/01/2025    CREATININE 1.38 (H) 07/01/2025     (L) 07/01/2025    K 4.4 07/01/2025    CL 95 (L) 07/01/2025    CO2 27.0 07/01/2025    CALCIUM 9.4 07/01/2025    PROTEINTOT 7.0 07/01/2025    ALBUMIN 4.4 07/01/2025    BILITOT 0.3 07/01/2025    ALKPHOS 69 07/01/2025    AST 24 " 07/01/2025    ALT 40 (H) 07/01/2025             ASSESSMENT & PLAN:  1.  Chronic DVT  2.  DESMOND in need of rifampin complicated by treatment with DOAC     - 6/9/2025 [facility] inpatient hematology consultation: Patient with remote history of CVA treated temporarily with Plavix then years later July 2023 fractured her left heel accidentally.  Pinning subsequently failed and had multiple surgeries and was immobile for the most part July through November 2023.  Did use sequential compression device on right leg.  Despite this.  March 2024 started having leg swelling.  A month later saw Dr. Zaragoza.  April 2024 Doppler showed subacute right lower extremity deep vein thrombosis proximal femoral and popliteal.  He started on DOAC.  August 2024 Doppler showed chronic DVT unchanged and he kept on Eliquis.  He performed another Doppler March 2025 that still showed the chronic DVT right lower extremity in the proximal femoral and popliteal.  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  My strong suspicion is that the immobilization was her provocation but rather than Cavalierly stopping anticoagulation and trusting that Theory completely, I will stop her Eliquis and place her on subcu Lovenox 1 mg/kg subcu every 12 hours and check her hypercoagulable workup for completeness sake.  I explained to her that even some of these abnormalities if found are not necessarily prove of cause and effect but would shade our decisions down the road as to keeping her on Lovenox for a year and switching back to Eliquis once off the antifungal or whether to simply stop anticoagulation.  I will also get her to see Dr. Penn with whom I have spoken today for vascular ultrasonographic interrogation and monitoring to give us an opinion as to anatomic risks of cessation given the proximity of the chronic clot.  I think it is virtually certain that this  chronic clot will never vanished on anticoagulation and chronic thrombosis by itself is not necessarily a de facto indication for lifetime anticoagulation.  Addendum:  Anticardiolipin IgG IgM/beta-2 glycoprotein IgG IgA IgM/lupus anticoagulant all negative  Antithrombin III functional 166  Protein C antigen 136 activity 129  Protein S antigen free 127, total 75 functional 104  Factor V Leiden negative  Prothrombin gene mutation not drawn    - 7/17/2025 [facility] hematology follow-up: Continues Lovenox while on antimycobacterial therapy.  No hypercoagulable condition but did not get the prothrombin gene mutation drawn unfortunately and I will draw that today.  For the foreseeable future she is going to stay on Lovenox and limiting concern is whether or not the magnitude of her chronic DVT is such that it is going to increase her risk substantially of recurrent clot or we to stop anticoagulation and for that I still need her to get to Dr. Penn with whom I spoke last visit but she has not seen yet.    Total time of care today inclusive of time spent today prior to patient's arrival reviewing interval data and during visit translating patient putting forth plan as outlined after visit instituting this plan took 50 minutes patient care time throughout the day today.  Shawn Wilkins MD    07/17/2025

## 2025-07-17 NOTE — LETTER
July 17, 2025     Rich Zaragoza MD  [address]  [facility] [address]    Patient: Agustina Reyes   YOB: 1955   Date of Visit: 7/17/2025     Dear Rich Zaragoza MD:       Thank you for referring Agustina Reyes to me for evaluation. Below are the relevant portions of my assessment and plan of care.    If you have questions, please do not hesitate to call me. I look forward to following Agustina along with you.         Sincerely,        Shawn Wilkins MD        CC: China Ivey, MD Eusebia Cleary, MD Mago Escobar, JAYNE Wilkins, Shawn NIEVES MD  07/17/25 1551  Sign when Signing Visit  CHIEF COMPLAINT: Chronic DVT in the face of mycobacterial infection with drug interaction with Eliquis    Problem List:  Oncology/Hematology History Overview Note   1.  Chronic DVT  2.  DESMOND in need of rifampin complicated by treatment with DOAC     - 6/9/2025 [facility] inpatient hematology consultation: Patient with remote history of CVA treated temporarily with Plavix then years later July 2023 fractured her left heel accidentally.  Pinning subsequently failed and had multiple surgeries and was immobile for the most part July through November 2023.  Did use sequential compression device on right leg.  Despite this.  March 2024 started having leg swelling.  A month later saw Dr. Zaragoza.  April 2024 Doppler showed subacute right lower extremity deep vein thrombosis proximal femoral and popliteal.  He started on DOAC.  August 2024 Doppler showed chronic DVT unchanged and he kept on Eliquis.  He performed another Doppler March 2025 that still showed the chronic DVT right lower extremity in the proximal femoral and popliteal.  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  My strong suspicion is that the immobilization was her provocation but rather  than Cavalierly stopping anticoagulation and trusting that Theory completely, I will stop her Eliquis and place her on subcu Lovenox 1 mg/kg subcu every 12 hours and check her hypercoagulable workup for completeness sake.  I explained to her that even some of these abnormalities if found are not necessarily prove of cause and effect but would shade our decisions down the road as to keeping her on Lovenox for a year and switching back to Eliquis once off the antifungal or whether to simply stop anticoagulation.  I will also get her to see Dr. Penn with whom I have spoken today for vascular ultrasonographic interrogation and monitoring to give us an opinion as to anatomic risks of cessation given the proximity of the chronic clot.  I think it is virtually certain that this chronic clot will never vanished on anticoagulation and chronic thrombosis by itself is not necessarily a de facto indication for lifetime anticoagulation.  Addendum:  Anticardiolipin IgG IgM/beta-2 glycoprotein IgG IgA IgM/lupus anticoagulant all negative  Antithrombin III functional 166  Protein C antigen 136 activity 129  Protein S antigen free 127, total 75 functional 104  Factor V Leiden negative  Prothrombin gene mutation not drawn     - 7/17/2025 [facility] hematology follow-up: Continues Lovenox while on antimycobacterial therapy.  No hypercoagulable condition but did not get the prothrombin gene mutation drawn unfortunately and I will draw that today.  For the foreseeable future she is going to stay on Lovenox and limiting concern is whether or not the magnitude of her chronic DVT is such that it is going to increase her risk substantially of recurrent clot or we to stop anticoagulation and for that I still need her to get to Dr. Penn with whom I spoke last visit but she has not seen yet.     Chronic deep vein thrombosis (DVT) of proximal vein of right lower extremity   6/6/2025 Initial Diagnosis    Chronic deep vein thrombosis (DVT)    "      HISTORY OF PRESENT ILLNESS:  The patient is a 69 y.o. adult, here for follow up on management of DVT chronic on Lovenox    Past Medical History:   Diagnosis Date   • Allergic rhinitis Na   • Cataract 2017   • COPD (chronic obstructive pulmonary disease) May2020   • COVID-19 virus infection 12/30/2024   • Deep vein thrombosis May24   • Diabetes mellitus 2/25   • GERD (gastroesophageal reflux disease)    • Hyperlipidemia    • Hypertension    • Migraines    • Occipital neuralgia of left side    • Pneumonia    • PONV (postoperative nausea and vomiting)    • Shingles    • Stroke 2014    no residual effects   • Umbilical hernia      Past Surgical History:   Procedure Laterality Date   • BRONCHOSCOPY N/A 08/19/2024    Procedure: BRONCHOSCOPY WITH BRONCHOALVEOLAR LAVAGE;  Surgeon: Aria Irwin DO;  Location: [facility] ENDOSCOPY;  Service: Pulmonary;  Laterality: N/A;   • BRONCHOSCOPY  10/24   • CATARACT EXTRACTION Bilateral    • COLONOSCOPY     • FOOT SURGERY Left     x4   • HYSTERECTOMY  2000   • SUBTOTAL HYSTERECTOMY         Allergies   Allergen Reactions   • Erythromycin GI Intolerance     Nausea     • Amlodipine Other (See Comments)     Pt states she does not remember an allergy to this medicine, states her legs were swelling   • Lisinopril Other (See Comments)     Joint pain   • Sulfa Antibiotics Unknown (See Comments)     Pt's home pharmacy has sulfa drugs listed as an allergy.       Family History and Social History reviewed and changed as necessary    REVIEW OF SYSTEM:   Itching under the skin where she gets bruises and in particular where she injects the Lovenox    PHYSICAL EXAM:  Ecchymoses at sites of trauma only    Vitals:    07/17/25 1445   BP: 141/56   Pulse: 76   Resp: 16   Temp: 97.9 °F (36.6 °C)   SpO2: 96%   Weight: 77.6 kg (171 lb)   Height: 167.6 cm (66\")     Vitals:    07/17/25 1445   PainSc: 0-No pain          ECOG score: 0           Vitals reviewed.    ECOG: (0) Fully Active - Able to Carry On " All Pre-disease Performance Without Restriction    Lab Results   Component Value Date    HGB 10.5 (L) 07/01/2025    HCT 32.6 (L) 07/01/2025    MCV 84.5 07/01/2025     07/01/2025    WBC 7.72 07/01/2025    NEUTROABS 5.50 07/01/2025    LYMPHSABS 1.34 07/01/2025    MONOSABS 0.48 07/01/2025    EOSABS 0.29 07/01/2025    BASOSABS 0.06 07/01/2025       Lab Results   Component Value Date    GLUCOSE 109 (H) 07/01/2025    BUN 14.3 07/01/2025    CREATININE 1.38 (H) 07/01/2025     (L) 07/01/2025    K 4.4 07/01/2025    CL 95 (L) 07/01/2025    CO2 27.0 07/01/2025    CALCIUM 9.4 07/01/2025    PROTEINTOT 7.0 07/01/2025    ALBUMIN 4.4 07/01/2025    BILITOT 0.3 07/01/2025    ALKPHOS 69 07/01/2025    AST 24 07/01/2025    ALT 40 (H) 07/01/2025             ASSESSMENT & PLAN:  1.  Chronic DVT  2.  DESMOND in need of rifampin complicated by treatment with DOAC     - 6/9/2025 [facility] inpatient hematology consultation: Patient with remote history of CVA treated temporarily with Plavix then years later July 2023 fractured her left heel accidentally.  Pinning subsequently failed and had multiple surgeries and was immobile for the most part July through November 2023.  Did use sequential compression device on right leg.  Despite this.  March 2024 started having leg swelling.  A month later saw Dr. Zaragoza.  April 2024 Doppler showed subacute right lower extremity deep vein thrombosis proximal femoral and popliteal.  He started on DOAC.  August 2024 Doppler showed chronic DVT unchanged and he kept on Eliquis.  He performed another Doppler March 2025 that still showed the chronic DVT right lower extremity in the proximal femoral and popliteal.  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  My strong suspicion is that the immobilization was her provocation but rather than Cavalierly stopping anticoagulation and trusting that Theory completely,  I will stop her Eliquis and place her on subcu Lovenox 1 mg/kg subcu every 12 hours and check her hypercoagulable workup for completeness sake.  I explained to her that even some of these abnormalities if found are not necessarily prove of cause and effect but would shade our decisions down the road as to keeping her on Lovenox for a year and switching back to Eliquis once off the antifungal or whether to simply stop anticoagulation.  I will also get her to see Dr. Penn with whom I have spoken today for vascular ultrasonographic interrogation and monitoring to give us an opinion as to anatomic risks of cessation given the proximity of the chronic clot.  I think it is virtually certain that this chronic clot will never vanished on anticoagulation and chronic thrombosis by itself is not necessarily a de facto indication for lifetime anticoagulation.  Addendum:  Anticardiolipin IgG IgM/beta-2 glycoprotein IgG IgA IgM/lupus anticoagulant all negative  Antithrombin III functional 166  Protein C antigen 136 activity 129  Protein S antigen free 127, total 75 functional 104  Factor V Leiden negative  Prothrombin gene mutation not drawn    - 7/17/2025 [facility] hematology follow-up: Continues Lovenox while on antimycobacterial therapy.  No hypercoagulable condition but did not get the prothrombin gene mutation drawn unfortunately and I will draw that today.  For the foreseeable future she is going to stay on Lovenox and limiting concern is whether or not the magnitude of her chronic DVT is such that it is going to increase her risk substantially of recurrent clot or we to stop anticoagulation and for that I still need her to get to Dr. Penn with whom I spoke last visit but she has not seen yet.    Total time of care today inclusive of time spent today prior to patient's arrival reviewing interval data and during visit translating patient putting forth plan as outlined after visit instituting this plan took 50 minutes patient  care time throughout the day today.  Shawn Wilkins MD    07/17/2025

## 2025-07-18 LAB
FACTOR II, DNA ANALYSIS: NORMAL
NIGHT BLUE STAIN TISS: NORMAL

## 2025-07-24 LAB
MYCOBACTERIUM SPEC CULT: NORMAL
NIGHT BLUE STAIN TISS: NORMAL

## 2025-07-29 ENCOUNTER — TELEPHONE (OUTPATIENT)
Dept: ONCOLOGY | Facility: CLINIC | Age: 70
End: 2025-07-29
Payer: COMMERCIAL

## 2025-07-29 NOTE — TELEPHONE ENCOUNTER
RN returned call to pt. Pt reports she saw vascular surgery last week and they prescribed plavix. Pharmacist told pt plavix may cause excessive bleeding. Pt called vascular surgery to clarify, vascular recommended pt take a daily aspirin and call Dr Wilkins to advise pt. This RN notified Dr Wilkins. Dr Wilkins recommended pt take the plavix alone. RN left pt a detailed vm letting her know Dr Wilkins recommends pt take the plavix alone and call back for any questions or concerns.

## 2025-07-29 NOTE — TELEPHONE ENCOUNTER
RN returned call to pt. Pt is asking if Dr Wilkins is aware of her medication rifampin, ethambutol and azithromycin. Pt's pharmacist advised these medication with plavix can cause excessive bleeding. RN will clarify with Dr Wilkins that is ok to take the plavix with these medications and call pt back with recommendation. Pt verbalized understanding.

## 2025-07-29 NOTE — TELEPHONE ENCOUNTER
"  TRIED TO W/T TO OFFICE BUT WAS UNSUCCESSFUL    Caller: Agustina Reyes \"Lorraine\"    Relationship: Self    Best call back number: [phone]    What is the best time to reach you: ANYTIME    Who are you requesting to speak with (clinical staff, provider,  specific staff member): CLINICAL    What was the call regarding: PATIENT HAS QUESTIONS ABOUT THE MSG THAT THE NURSE LEFT, PLEASE CALL TO DISCUSS FURTHER.   "

## 2025-07-29 NOTE — TELEPHONE ENCOUNTER
Patient left a voicemail saw the vascular surgeon was prescribed some medication has some questions. Please call.

## 2025-07-29 NOTE — TELEPHONE ENCOUNTER
RN notified Dr Wilkins of pt's current medications. Dr Wilkins advised he spoke with Dr Hoover, and Dr Wilkins recommends pt go up on her aspirin to 325 mg daily and do not do the Plavix while on the rifampin, ethambutol, and azithromycin. RN called pt back and let her know. Pt verbalized understanding.

## 2025-07-31 ENCOUNTER — OFFICE VISIT (OUTPATIENT)
Age: 70
End: 2025-07-31
Payer: COMMERCIAL

## 2025-07-31 VITALS
HEART RATE: 71 BPM | TEMPERATURE: 96.8 F | BODY MASS INDEX: 27.16 KG/M2 | HEIGHT: 66 IN | WEIGHT: 169 LBS | OXYGEN SATURATION: 95 % | SYSTOLIC BLOOD PRESSURE: 114 MMHG | DIASTOLIC BLOOD PRESSURE: 62 MMHG

## 2025-07-31 DIAGNOSIS — K21.00 GASTROESOPHAGEAL REFLUX DISEASE WITH ESOPHAGITIS WITHOUT HEMORRHAGE: ICD-10-CM

## 2025-07-31 DIAGNOSIS — J30.89 NON-SEASONAL ALLERGIC RHINITIS, UNSPECIFIED TRIGGER: ICD-10-CM

## 2025-07-31 DIAGNOSIS — J44.9 STAGE 2 MODERATE COPD BY GOLD CLASSIFICATION: Primary | ICD-10-CM

## 2025-07-31 DIAGNOSIS — A31.0 MYCOBACTERIUM INTRACELLULARE INFECTION: ICD-10-CM

## 2025-07-31 LAB
MYCOBACTERIUM SPEC CULT: NORMAL
NIGHT BLUE STAIN TISS: NORMAL

## 2025-07-31 PROCEDURE — 99214 OFFICE O/P EST MOD 30 MIN: CPT | Performed by: NURSE PRACTITIONER

## 2025-07-31 RX ORDER — SENNOSIDES 8.6 MG
325 CAPSULE ORAL EVERY 6 HOURS PRN
COMMUNITY

## 2025-07-31 RX ORDER — ETHAMBUTOL HYDROCHLORIDE 400 MG/1
3 TABLET, FILM COATED ORAL DAILY
COMMUNITY
Start: 2025-07-22

## 2025-07-31 RX ORDER — CLOPIDOGREL BISULFATE 75 MG/1
1 TABLET ORAL DAILY
COMMUNITY
Start: 2025-07-26 | End: 2025-07-31

## 2025-07-31 NOTE — PROGRESS NOTES
[facility], [place] [facility] Follow up    CHIEF COMPLAINT    No complaints    HISTORY OF PRESENT ILLNESS    Agustina Reyes is a 69 y.o.adult here today for follow.  She was last seen in the office by me in June.  She denies any respiratory illnesses since her last appointment.    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  She was referred to infectious disease.  She was started on Arikayce and azithromycin/ethambutol.  She developed severe shortness of breath on 6/6 and presented to [facility] and was admitted to 6/9 she had a CT scan that actually showed improvement from her previous scan.  It was felt that she was having bronchospasms from the Arikayce and started on p.o./IV steroids and inhaled budesonide with DuoNebs.  She completed her prednisone.      She states she used the nebulizers for about 2 weeks after this appointment and has no longer been needing the nebulizers.  She did feel like they helped.    She has returned back to her baseline.  She was also discharged home on oxygen but has not used it since a couple weeks before I saw her.    She continues to use the Breztri twice a day.  She uses the albuterol rarely.    She has not noticed a cough recently.  She denies any hemoptysis.  She denies any sputum production.  She denies any fever, chills or night sweats.    She returned back to work and has had no difficulties returning back to work.  She denies any breathing difficulties.    She continues to follow with Dr. Wilkins for her chronic DVT.  She is currently on aspirin.    She is continuing to follow with infectious disease.  She has a follow-up soon.  She remains on the triple therapy for her Mycobacterium infection.  She submitted new sputum cultures recently.    She denies any chest pain or palpitations.  Denies any lower extremity edema or calf tenderness.    She quit smoking 10 years ago and has a 40-pack-year history.    She denies any reflux  symptoms.    Patient Active Problem List   Diagnosis    Occipital neuralgia of left side    Hypertension    Migraine with aura and without status migrainosus, not intractable    Hyperlipidemia    Occlusion and stenosis of left posterior cerebral artery    History of CVA (cerebrovascular accident)    Prediabetes    Stage 2 moderate COPD by GOLD classification    Abnormal CT of the chest    Non-seasonal allergic rhinitis    Gastroesophageal reflux disease with esophagitis without hemorrhage    Incidental lung nodule, greater than or equal to 8mm    Shortness of breath    Fever and chills    Recurrent epistaxis    Mycobacterium intracellulare infection    Dyspnea    COPD exacerbation    Medication reaction    Chronic deep vein thrombosis (DVT) of proximal vein of right lower extremity       Allergies   Allergen Reactions    Erythromycin GI Intolerance     Nausea      Amlodipine Other (See Comments)     Pt states she does not remember an allergy to this medicine, states her legs were swelling    Lisinopril Other (See Comments)     Joint pain    Sulfa Antibiotics Unknown (See Comments)     Pt's home pharmacy has sulfa drugs listed as an allergy.       Current Outpatient Medications:     albuterol sulfate  (90 Base) MCG/ACT inhaler, Inhale 2 puffs Every 4 (Four) Hours As Needed for Wheezing or Shortness of Air., Disp: 8.5 g, Rfl: 0    aspirin 325 MG EC tablet, Take 1 tablet by mouth Every 6 (Six) Hours As Needed for Mild Pain., Disp: , Rfl:     atorvastatin (LIPITOR) 40 MG tablet, Take 1 tablet by mouth Daily., Disp: 90 tablet, Rfl: 3    azithromycin (ZITHROMAX) 500 MG tablet, , Disp: , Rfl:     Budeson-Glycopyrrol-Formoterol (Breztri Aerosphere) 160-9-4.8 MCG/ACT aerosol inhaler, INHALE 2 PUFFS BY MOUTH TWICE DAILY, Disp: 3 each, Rfl: 3    butalbital-acetaminophen  MG tablet tablet, Take 1-2 tablets by mouth Every 6 (Six) Hours As Needed (migraines)., Disp: 15 each, Rfl: 0    chlorthalidone (HYGROTON) 25 MG  tablet, TAKE 1 TABLET BY MOUTH DAILY, Disp: 90 tablet, Rfl: 3    enoxaparin sodium (LOVENOX) 80 MG/0.8ML solution prefilled syringe syringe, Inject 0.8 mL under the skin into the appropriate area as directed Every 12 (Twelve) Hours. Indications: history of DVT/PE, Disp: , Rfl:     ethambutol (MYAMBUTOL) 400 MG tablet, Take 3 tablets by mouth Daily., Disp: , Rfl:     Hydrocod Aly-Chlorphe Aly ER (TUSSIONEX PENNKINETIC) 10-8 MG/5ML ER suspension, TAKE 5ML BY MOUTH EVERY 12 HOURS AS NEEDED FOR COUGH, Disp: , Rfl:     ipratropium-albuterol (DUO-NEB) 0.5-2.5 mg/3 ml nebulizer, Take 3 mL by nebulization 4 (Four) Times a Day As Needed for Wheezing., Disp: 120 mL, Rfl: 5    losartan (COZAAR) 100 MG tablet, Take 1 tablet by mouth Daily., Disp: 90 tablet, Rfl: 3    ondansetron ODT (ZOFRAN-ODT) 4 MG disintegrating tablet, Place 1 tablet on the tongue Every 8 (Eight) Hours As Needed for Nausea or Vomiting., Disp: , Rfl:     prochlorperazine (COMPAZINE) 5 MG tablet, Take 1-2 tablets by mouth Every 6 (Six) Hours As Needed for Nausea or Vomiting (migraine)., Disp: 20 tablet, Rfl: 5    rifAMPin (RIFADIN) 300 MG capsule, Take 1 capsule by mouth., Disp: , Rfl:   MEDICATION LIST AND ALLERGIES REVIEWED.    Social History     Tobacco Use    Smoking status: Former     Current packs/day: 0.00     Average packs/day: 1 pack/day for 40.0 years (40.0 ttl pk-yrs)     Types: Cigarettes     Start date: 8/4/1974     Quit date: 8/4/2014     Years since quitting: 10.9     Passive exposure: Past    Smokeless tobacco: Never   Vaping Use    Vaping status: Never Used   Substance Use Topics    Alcohol use: Yes     Comment: occ.    Drug use: No       FAMILY AND SOCIAL HISTORY REVIEWED.    Review of Systems   Constitutional:  Negative for activity change, appetite change, fatigue, fever and unexpected weight change.   HENT:  Negative for congestion, postnasal drip, rhinorrhea, sinus pressure, sore throat and voice change.    Eyes:  Negative for visual  "disturbance.   Respiratory:  Negative for cough, chest tightness, shortness of breath and wheezing.    Cardiovascular:  Negative for chest pain, palpitations and leg swelling.   Gastrointestinal:  Negative for abdominal distention, abdominal pain, nausea and vomiting.   Endocrine: Negative for cold intolerance and heat intolerance.   Genitourinary:  Negative for difficulty urinating and urgency.   Musculoskeletal:  Negative for arthralgias, back pain and neck pain.   Skin:  Negative for color change and pallor.   Allergic/Immunologic: Negative for environmental allergies and food allergies.   Neurological:  Negative for dizziness, syncope, weakness and light-headedness.   Hematological:  Negative for adenopathy. Does not bruise/bleed easily.   Psychiatric/Behavioral:  Negative for agitation and behavioral problems.    .    /62   Pulse 71   Temp 96.8 °F (36 °C)   Ht 167.6 cm (66\")   Wt 76.7 kg (169 lb)   LMP  (LMP Unknown)   SpO2 95% Comment: Room air at rest  BMI 27.28 kg/m²     Immunization History   Administered Date(s) Administered    COVID-19 (PFIZER) 12YRS+ (COMIRNATY) 10/12/2023    COVID-19 (PFIZER) BIVALENT 12+YRS 10/24/2022    COVID-19 (PFIZER) Purple Cap Monovalent 02/15/2021, 03/09/2021, 03/27/2021, 10/06/2021, 11/22/2021    Fluzone  >6mos 03/10/2019, 08/28/2020    Fluzone High-Dose 65+YRS 10/14/2024    Fluzone High-Dose 65+yrs 10/06/2021, 10/24/2022, 10/12/2023    Fluzone Quad >6mos (Multi-dose) 03/10/2019, 11/22/2021    Pneumococcal, Unspecified 05/05/2021    Shingrix 12/15/2019, 02/09/2020    Tdap 08/17/2023       Physical Exam  Vitals and nursing note reviewed.   Constitutional:       Appearance: She is well-developed. She is not diaphoretic.   HENT:      Head: Normocephalic and atraumatic.   Eyes:      Pupils: Pupils are equal, round, and reactive to light.   Neck:      Thyroid: No thyromegaly.   Cardiovascular:      Rate and Rhythm: Normal rate and regular rhythm.      Heart sounds: " Normal heart sounds. No murmur heard.     No friction rub. No gallop.   Pulmonary:      Effort: Pulmonary effort is normal. No respiratory distress.      Breath sounds: Normal breath sounds. No wheezing or rales.   Chest:      Chest wall: No tenderness.   Abdominal:      General: Bowel sounds are normal.      Palpations: Abdomen is soft.      Tenderness: There is no abdominal tenderness.   Musculoskeletal:         General: No swelling. Normal range of motion.      Cervical back: Normal range of motion and neck supple.   Lymphadenopathy:      Cervical: No cervical adenopathy.   Skin:     General: Skin is warm and dry.      Capillary Refill: Capillary refill takes less than 2 seconds.   Neurological:      Mental Status: She is alert and oriented to person, place, and time.   Psychiatric:         Mood and Affect: Mood normal.         Behavior: Behavior normal.           RESULTS    PROBLEM LIST    Problem List Items Addressed This Visit          ENT    Non-seasonal allergic rhinitis       Gastrointestinal Abdominal     Gastroesophageal reflux disease with esophagitis without hemorrhage       Pulmonary and Pneumonias    Stage 2 moderate COPD by GOLD classification - Primary       Other    Mycobacterium intracellulare infection    Relevant Medications    ethambutol (MYAMBUTOL) 400 MG tablet    aspirin 325 MG EC tablet         DISCUSSION    Ms. Reyes was here for follow-up.  Seems to be doing okay from a pulmonary standpoint.  She is continue to follow with infectious disease for her Mycobacterium infection.  She has a follow-up soon.  I did advise her that we would probably need to repeat a CT scan of the chest in 6 months approximately but I will leave this up to infectious disease to decide.    For now she is going to continue the Breztri twice a day.  I did advise her that she may not need it twice a day she can try to come off of it and see if her breathing improves.    She will continue to monitor for GERD symptoms.   She currently has none.    She will continue to take her allergy medicine daily.    We will have her follow-up in 6 months    I personally spent a total of 34 minutes on patient visit today including chart review, face to face with the patient obtaining the history and physical exam, review of pertinent images and tests, counseling and discussion and/or coordination of care as described above, and documentation.  Total time excludes time spent on other separate services such as performing procedures or test interpretation, if applicable.        Mago Bowden, APRN  07/31/202511:24 EDT  Electronically signed     Please note that portions of this note were completed with a voice recognition program.        CC: Rich Zaragoza MD

## 2025-08-05 ENCOUNTER — TRANSCRIBE ORDERS (OUTPATIENT)
Dept: LAB | Facility: HOSPITAL | Age: 70
End: 2025-08-05
Payer: COMMERCIAL

## 2025-08-05 ENCOUNTER — LAB (OUTPATIENT)
Dept: LAB | Facility: HOSPITAL | Age: 70
End: 2025-08-05
Payer: COMMERCIAL

## 2025-08-05 DIAGNOSIS — A31.0 PULMONARY DISEASE DUE TO MYCOBACTERIA: ICD-10-CM

## 2025-08-05 DIAGNOSIS — J96.01 ACUTE RESPIRATORY FAILURE WITH HYPOXIA: Primary | ICD-10-CM

## 2025-08-05 DIAGNOSIS — J96.01 ACUTE RESPIRATORY FAILURE WITH HYPOXIA: ICD-10-CM

## 2025-08-05 DIAGNOSIS — T50.995D: ICD-10-CM

## 2025-08-05 LAB
ALBUMIN SERPL-MCNC: 4.2 G/DL (ref 3.5–5.2)
ALBUMIN/GLOB SERPL: 1.8 G/DL
ALP SERPL-CCNC: 52 U/L (ref 39–117)
ALT SERPL W P-5'-P-CCNC: 18 U/L (ref 1–33)
ANION GAP SERPL CALCULATED.3IONS-SCNC: 12.2 MMOL/L (ref 5–15)
AST SERPL-CCNC: 17 U/L (ref 1–32)
BASOPHILS # BLD AUTO: 0.07 10*3/MM3 (ref 0–0.2)
BASOPHILS NFR BLD AUTO: 1 % (ref 0–1.5)
BILIRUB SERPL-MCNC: 0.2 MG/DL (ref 0–1.2)
BUN SERPL-MCNC: 13.8 MG/DL (ref 8–23)
BUN/CREAT SERPL: 13.8 (ref 7–25)
CALCIUM SPEC-SCNC: 9.3 MG/DL (ref 8.6–10.5)
CHLORIDE SERPL-SCNC: 104 MMOL/L (ref 98–107)
CO2 SERPL-SCNC: 24.8 MMOL/L (ref 22–29)
CREAT SERPL-MCNC: 1 MG/DL (ref 0.57–1)
CRP SERPL-MCNC: 1.14 MG/DL (ref 0–0.5)
DEPRECATED RDW RBC AUTO: 48.1 FL (ref 37–54)
EGFRCR SERPLBLD CKD-EPI 2021: 61.1 ML/MIN/1.73
EOSINOPHIL # BLD AUTO: 0.37 10*3/MM3 (ref 0–0.4)
EOSINOPHIL NFR BLD AUTO: 5.2 % (ref 0.3–6.2)
ERYTHROCYTE [DISTWIDTH] IN BLOOD BY AUTOMATED COUNT: 15.5 % (ref 12.3–15.4)
ERYTHROCYTE [SEDIMENTATION RATE] IN BLOOD: 17 MM/HR (ref 0–30)
GLOBULIN UR ELPH-MCNC: 2.4 GM/DL
GLUCOSE SERPL-MCNC: 100 MG/DL (ref 65–99)
HCT VFR BLD AUTO: 32.5 % (ref 34–46.6)
HGB BLD-MCNC: 10.7 G/DL (ref 12–15.9)
IMM GRANULOCYTES # BLD AUTO: 0.01 10*3/MM3 (ref 0–0.05)
IMM GRANULOCYTES NFR BLD AUTO: 0.1 % (ref 0–0.5)
LYMPHOCYTES # BLD AUTO: 2.07 10*3/MM3 (ref 0.7–3.1)
LYMPHOCYTES NFR BLD AUTO: 29.1 % (ref 19.6–45.3)
MCH RBC QN AUTO: 27.9 PG (ref 26.6–33)
MCHC RBC AUTO-ENTMCNC: 32.9 G/DL (ref 31.5–35.7)
MCV RBC AUTO: 84.9 FL (ref 79–97)
MONOCYTES # BLD AUTO: 0.49 10*3/MM3 (ref 0.1–0.9)
MONOCYTES NFR BLD AUTO: 6.9 % (ref 5–12)
NEUTROPHILS NFR BLD AUTO: 4.11 10*3/MM3 (ref 1.7–7)
NEUTROPHILS NFR BLD AUTO: 57.7 % (ref 42.7–76)
NRBC BLD AUTO-RTO: 0 /100 WBC (ref 0–0.2)
PLATELET # BLD AUTO: 245 10*3/MM3 (ref 140–450)
PMV BLD AUTO: 9.5 FL (ref 6–12)
POTASSIUM SERPL-SCNC: 3.7 MMOL/L (ref 3.5–5.2)
PROT SERPL-MCNC: 6.6 G/DL (ref 6–8.5)
RBC # BLD AUTO: 3.83 10*6/MM3 (ref 3.77–5.28)
SODIUM SERPL-SCNC: 141 MMOL/L (ref 136–145)
WBC NRBC COR # BLD AUTO: 7.12 10*3/MM3 (ref 3.4–10.8)

## 2025-08-05 PROCEDURE — 36415 COLL VENOUS BLD VENIPUNCTURE: CPT

## 2025-08-05 PROCEDURE — 85025 COMPLETE CBC W/AUTO DIFF WBC: CPT

## 2025-08-05 PROCEDURE — 86140 C-REACTIVE PROTEIN: CPT

## 2025-08-05 PROCEDURE — 80053 COMPREHEN METABOLIC PANEL: CPT

## 2025-08-05 PROCEDURE — 85652 RBC SED RATE AUTOMATED: CPT

## 2025-08-07 LAB
MYCOBACTERIUM SPEC CULT: NORMAL
NIGHT BLUE STAIN TISS: NORMAL

## 2025-08-11 RX ORDER — LOSARTAN POTASSIUM 100 MG/1
100 TABLET ORAL DAILY
Qty: 90 TABLET | Refills: 3 | Status: SHIPPED | OUTPATIENT
Start: 2025-08-11

## 2025-08-14 LAB
MYCOBACTERIUM SPEC CULT: NORMAL
NIGHT BLUE STAIN TISS: NORMAL

## 2025-08-21 LAB
MYCOBACTERIUM SPEC CULT: NORMAL
NIGHT BLUE STAIN TISS: NORMAL

## 2025-08-25 ENCOUNTER — LAB (OUTPATIENT)
Dept: LAB | Facility: HOSPITAL | Age: 70
End: 2025-08-25
Payer: COMMERCIAL

## 2025-08-25 ENCOUNTER — OFFICE VISIT (OUTPATIENT)
Dept: ONCOLOGY | Facility: CLINIC | Age: 70
End: 2025-08-25
Payer: COMMERCIAL

## 2025-08-25 VITALS
DIASTOLIC BLOOD PRESSURE: 67 MMHG | OXYGEN SATURATION: 95 % | TEMPERATURE: 97.3 F | SYSTOLIC BLOOD PRESSURE: 147 MMHG | HEIGHT: 66 IN | WEIGHT: 170 LBS | BODY MASS INDEX: 27.32 KG/M2 | HEART RATE: 76 BPM

## 2025-08-25 DIAGNOSIS — D64.9 ANEMIA, UNSPECIFIED TYPE: ICD-10-CM

## 2025-08-25 DIAGNOSIS — I82.5Y1 CHRONIC DEEP VEIN THROMBOSIS (DVT) OF PROXIMAL VEIN OF RIGHT LOWER EXTREMITY: Primary | ICD-10-CM

## 2025-08-25 LAB
BASOPHILS # BLD AUTO: 0.04 10*3/MM3 (ref 0–0.2)
BASOPHILS NFR BLD AUTO: 0.6 % (ref 0–1.5)
BILIRUB CONJ SERPL-MCNC: 0.2 MG/DL (ref 0–0.3)
BILIRUB INDIRECT SERPL-MCNC: 0.1 MG/DL
BILIRUB SERPL-MCNC: 0.3 MG/DL (ref 0–1.2)
DAT POLY-SP REAG RBC QL: NEGATIVE
DEPRECATED RDW RBC AUTO: 48.3 FL (ref 37–54)
EOSINOPHIL # BLD AUTO: 0.26 10*3/MM3 (ref 0–0.4)
EOSINOPHIL NFR BLD AUTO: 3.8 % (ref 0.3–6.2)
ERYTHROCYTE [DISTWIDTH] IN BLOOD BY AUTOMATED COUNT: 15 % (ref 12.3–15.4)
FERRITIN SERPL-MCNC: 41.5 NG/ML (ref 13–150)
FOLATE SERPL-MCNC: 3.56 NG/ML (ref 4.78–24.2)
HAPTOGLOB SERPL-MCNC: 155 MG/DL (ref 30–200)
HCT VFR BLD AUTO: 37.1 % (ref 34–46.6)
HCYS SERPL-MCNC: 28.8 UMOL/L (ref 0–15)
HGB BLD-MCNC: 11.9 G/DL (ref 12–15.9)
IMM GRANULOCYTES # BLD AUTO: 0.01 10*3/MM3 (ref 0–0.05)
IMM GRANULOCYTES NFR BLD AUTO: 0.1 % (ref 0–0.5)
IRON 24H UR-MRATE: 58 MCG/DL (ref 37–145)
IRON SATN MFR SERPL: 18 % (ref 20–50)
LYMPHOCYTES # BLD AUTO: 1.97 10*3/MM3 (ref 0.7–3.1)
LYMPHOCYTES NFR BLD AUTO: 29.1 % (ref 19.6–45.3)
MCH RBC QN AUTO: 28 PG (ref 26.6–33)
MCHC RBC AUTO-ENTMCNC: 32.1 G/DL (ref 31.5–35.7)
MCV RBC AUTO: 87.3 FL (ref 79–97)
MONOCYTES # BLD AUTO: 0.52 10*3/MM3 (ref 0.1–0.9)
MONOCYTES NFR BLD AUTO: 7.7 % (ref 5–12)
NEUTROPHILS NFR BLD AUTO: 3.96 10*3/MM3 (ref 1.7–7)
NEUTROPHILS NFR BLD AUTO: 58.7 % (ref 42.7–76)
PLATELET # BLD AUTO: 197 10*3/MM3 (ref 140–450)
PMV BLD AUTO: 9.5 FL (ref 6–12)
RBC # BLD AUTO: 4.25 10*6/MM3 (ref 3.77–5.28)
RETICS # AUTO: 0.06 10*6/MM3 (ref 0.02–0.13)
RETICS/RBC NFR AUTO: 1.45 % (ref 0.7–1.9)
TIBC SERPL-MCNC: 323 MCG/DL (ref 298–536)
TRANSFERRIN SERPL-MCNC: 217 MG/DL (ref 200–360)
VIT B12 BLD-MCNC: 378 PG/ML (ref 211–946)
WBC NRBC COR # BLD AUTO: 6.76 10*3/MM3 (ref 3.4–10.8)

## 2025-08-25 PROCEDURE — 82247 BILIRUBIN TOTAL: CPT

## 2025-08-25 PROCEDURE — 82668 ASSAY OF ERYTHROPOIETIN: CPT

## 2025-08-25 PROCEDURE — 82746 ASSAY OF FOLIC ACID SERUM: CPT

## 2025-08-25 PROCEDURE — 82248 BILIRUBIN DIRECT: CPT

## 2025-08-25 PROCEDURE — 99215 OFFICE O/P EST HI 40 MIN: CPT | Performed by: INTERNAL MEDICINE

## 2025-08-25 PROCEDURE — 85025 COMPLETE CBC W/AUTO DIFF WBC: CPT

## 2025-08-25 PROCEDURE — 82728 ASSAY OF FERRITIN: CPT

## 2025-08-25 PROCEDURE — 83010 ASSAY OF HAPTOGLOBIN QUANT: CPT

## 2025-08-25 PROCEDURE — 82607 VITAMIN B-12: CPT

## 2025-08-25 PROCEDURE — 36415 COLL VENOUS BLD VENIPUNCTURE: CPT

## 2025-08-25 PROCEDURE — 83090 ASSAY OF HOMOCYSTEINE: CPT

## 2025-08-25 PROCEDURE — 86880 COOMBS TEST DIRECT: CPT

## 2025-08-25 PROCEDURE — 84466 ASSAY OF TRANSFERRIN: CPT

## 2025-08-25 PROCEDURE — 85045 AUTOMATED RETICULOCYTE COUNT: CPT

## 2025-08-25 PROCEDURE — 83921 ORGANIC ACID SINGLE QUANT: CPT

## 2025-08-25 PROCEDURE — 83540 ASSAY OF IRON: CPT

## 2025-08-26 LAB — EPO SERPL-ACNC: 19.7 MIU/ML (ref 2.6–18.5)

## 2025-08-28 LAB
MYCOBACTERIUM SPEC CULT: NORMAL
NIGHT BLUE STAIN TISS: NORMAL

## 2025-08-30 LAB — METHYLMALONATE SERPL-SCNC: 259 NMOL/L (ref 0–378)

## (undated) DEVICE — SYR SLP TP 10ML DISP

## (undated) DEVICE — SINGLE USE SUCTION VALVE MAJ-209: Brand: SINGLE USE SUCTION VALVE (STERILE)

## (undated) DEVICE — SINGLE USE BIOPSY VALVE MAJ-210: Brand: SINGLE USE BIOPSY VALVE (STERILE)

## (undated) DEVICE — BOWL UTIL STRL 32OZ

## (undated) DEVICE — SYR LUER SLPTP 50ML

## (undated) DEVICE — STERILE POLYESTER TIPPED APPLICATORS: Brand: PURITAN

## (undated) DEVICE — TRAP,MUCUS SPECIMEN,40CC: Brand: MEDLINE